# Patient Record
Sex: MALE | Race: BLACK OR AFRICAN AMERICAN | NOT HISPANIC OR LATINO | Employment: OTHER | URBAN - METROPOLITAN AREA
[De-identification: names, ages, dates, MRNs, and addresses within clinical notes are randomized per-mention and may not be internally consistent; named-entity substitution may affect disease eponyms.]

---

## 2023-11-10 ENCOUNTER — APPOINTMENT (OUTPATIENT)
Dept: RADIOLOGY | Facility: HOSPITAL | Age: 63
End: 2023-11-10
Payer: MEDICAID

## 2023-11-10 ENCOUNTER — HOSPITAL ENCOUNTER (EMERGENCY)
Facility: HOSPITAL | Age: 63
Discharge: HOME | End: 2023-11-11
Attending: EMERGENCY MEDICINE
Payer: MEDICAID

## 2023-11-10 DIAGNOSIS — E86.0 DEHYDRATION: Primary | ICD-10-CM

## 2023-11-10 DIAGNOSIS — M79.18 BUTTOCK PAIN: ICD-10-CM

## 2023-11-10 LAB
ALBUMIN SERPL BCP-MCNC: 4.3 G/DL (ref 3.4–5)
ALP SERPL-CCNC: 113 U/L (ref 33–136)
ALT SERPL W P-5'-P-CCNC: 7 U/L (ref 10–52)
ANION GAP SERPL CALC-SCNC: 18 MMOL/L (ref 10–20)
AST SERPL W P-5'-P-CCNC: 22 U/L (ref 9–39)
BASOPHILS # BLD AUTO: 0.03 X10*3/UL (ref 0–0.1)
BASOPHILS NFR BLD AUTO: 0.5 %
BILIRUB SERPL-MCNC: 0.3 MG/DL (ref 0–1.2)
BUN SERPL-MCNC: 15 MG/DL (ref 6–23)
CALCIUM SERPL-MCNC: 10.3 MG/DL (ref 8.6–10.6)
CHLORIDE SERPL-SCNC: 102 MMOL/L (ref 98–107)
CO2 SERPL-SCNC: 22 MMOL/L (ref 21–32)
CREAT SERPL-MCNC: 0.71 MG/DL (ref 0.5–1.3)
EOSINOPHIL # BLD AUTO: 0.03 X10*3/UL (ref 0–0.7)
EOSINOPHIL NFR BLD AUTO: 0.5 %
ERYTHROCYTE [DISTWIDTH] IN BLOOD BY AUTOMATED COUNT: 14.6 % (ref 11.5–14.5)
GFR SERPL CREATININE-BSD FRML MDRD: >90 ML/MIN/1.73M*2
GLUCOSE SERPL-MCNC: 94 MG/DL (ref 74–99)
HCT VFR BLD AUTO: 42.7 % (ref 41–52)
HGB BLD-MCNC: 13.7 G/DL (ref 13.5–17.5)
IMM GRANULOCYTES # BLD AUTO: 0.01 X10*3/UL (ref 0–0.7)
IMM GRANULOCYTES NFR BLD AUTO: 0.2 % (ref 0–0.9)
LYMPHOCYTES # BLD AUTO: 1.29 X10*3/UL (ref 1.2–4.8)
LYMPHOCYTES NFR BLD AUTO: 23.6 %
MCH RBC QN AUTO: 25.1 PG (ref 26–34)
MCHC RBC AUTO-ENTMCNC: 32.1 G/DL (ref 32–36)
MCV RBC AUTO: 78 FL (ref 80–100)
MONOCYTES # BLD AUTO: 0.41 X10*3/UL (ref 0.1–1)
MONOCYTES NFR BLD AUTO: 7.5 %
NEUTROPHILS # BLD AUTO: 3.7 X10*3/UL (ref 1.2–7.7)
NEUTROPHILS NFR BLD AUTO: 67.7 %
NRBC BLD-RTO: 0 /100 WBCS (ref 0–0)
PLATELET # BLD AUTO: 245 X10*3/UL (ref 150–450)
POTASSIUM SERPL-SCNC: 4.5 MMOL/L (ref 3.5–5.3)
PROT SERPL-MCNC: 7.6 G/DL (ref 6.4–8.2)
RBC # BLD AUTO: 5.46 X10*6/UL (ref 4.5–5.9)
SODIUM SERPL-SCNC: 137 MMOL/L (ref 136–145)
WBC # BLD AUTO: 5.5 X10*3/UL (ref 4.4–11.3)

## 2023-11-10 PROCEDURE — 36415 COLL VENOUS BLD VENIPUNCTURE: CPT | Performed by: EMERGENCY MEDICINE

## 2023-11-10 PROCEDURE — 99285 EMERGENCY DEPT VISIT HI MDM: CPT | Mod: 25 | Performed by: EMERGENCY MEDICINE

## 2023-11-10 PROCEDURE — 93005 ELECTROCARDIOGRAM TRACING: CPT

## 2023-11-10 PROCEDURE — 85025 COMPLETE CBC W/AUTO DIFF WBC: CPT | Performed by: EMERGENCY MEDICINE

## 2023-11-10 PROCEDURE — 96360 HYDRATION IV INFUSION INIT: CPT | Mod: 59

## 2023-11-10 PROCEDURE — 2500000004 HC RX 250 GENERAL PHARMACY W/ HCPCS (ALT 636 FOR OP/ED): Mod: SE | Performed by: EMERGENCY MEDICINE

## 2023-11-10 PROCEDURE — 84295 ASSAY OF SERUM SODIUM: CPT | Performed by: EMERGENCY MEDICINE

## 2023-11-10 PROCEDURE — 99285 EMERGENCY DEPT VISIT HI MDM: CPT | Performed by: EMERGENCY MEDICINE

## 2023-11-10 PROCEDURE — 74177 CT ABD & PELVIS W/CONTRAST: CPT | Mod: FR

## 2023-11-10 PROCEDURE — 74177 CT ABD & PELVIS W/CONTRAST: CPT | Mod: FOREIGN READ | Performed by: RADIOLOGY

## 2023-11-10 PROCEDURE — 96361 HYDRATE IV INFUSION ADD-ON: CPT

## 2023-11-10 PROCEDURE — 2550000001 HC RX 255 CONTRASTS: Mod: SE | Performed by: EMERGENCY MEDICINE

## 2023-11-10 RX ADMIN — IOHEXOL 100 ML: 350 INJECTION, SOLUTION INTRAVENOUS at 23:44

## 2023-11-10 RX ADMIN — SODIUM CHLORIDE, SODIUM LACTATE, POTASSIUM CHLORIDE, AND CALCIUM CHLORIDE 1000 ML: 600; 310; 30; 20 INJECTION, SOLUTION INTRAVENOUS at 22:31

## 2023-11-10 ASSESSMENT — LIFESTYLE VARIABLES
HAVE YOU EVER FELT YOU SHOULD CUT DOWN ON YOUR DRINKING: NO
REASON UNABLE TO ASSESS: NO
HAVE PEOPLE ANNOYED YOU BY CRITICIZING YOUR DRINKING: NO
EVER FELT BAD OR GUILTY ABOUT YOUR DRINKING: NO
EVER HAD A DRINK FIRST THING IN THE MORNING TO STEADY YOUR NERVES TO GET RID OF A HANGOVER: NO

## 2023-11-10 ASSESSMENT — COLUMBIA-SUICIDE SEVERITY RATING SCALE - C-SSRS
2. HAVE YOU ACTUALLY HAD ANY THOUGHTS OF KILLING YOURSELF?: NO
1. IN THE PAST MONTH, HAVE YOU WISHED YOU WERE DEAD OR WISHED YOU COULD GO TO SLEEP AND NOT WAKE UP?: NO
6. HAVE YOU EVER DONE ANYTHING, STARTED TO DO ANYTHING, OR PREPARED TO DO ANYTHING TO END YOUR LIFE?: NO

## 2023-11-11 VITALS
TEMPERATURE: 99.1 F | WEIGHT: 150 LBS | HEIGHT: 67 IN | OXYGEN SATURATION: 98 % | HEART RATE: 54 BPM | DIASTOLIC BLOOD PRESSURE: 42 MMHG | BODY MASS INDEX: 23.54 KG/M2 | RESPIRATION RATE: 16 BRPM | SYSTOLIC BLOOD PRESSURE: 98 MMHG

## 2023-11-11 LAB
APPEARANCE UR: CLEAR
BILIRUB UR STRIP.AUTO-MCNC: NEGATIVE MG/DL
COLOR UR: ABNORMAL
GLUCOSE UR STRIP.AUTO-MCNC: NEGATIVE MG/DL
HOLD SPECIMEN: NORMAL
KETONES UR STRIP.AUTO-MCNC: NEGATIVE MG/DL
LEUKOCYTE ESTERASE UR QL STRIP.AUTO: NEGATIVE
NITRITE UR QL STRIP.AUTO: NEGATIVE
PH UR STRIP.AUTO: 5 [PH]
PROT UR STRIP.AUTO-MCNC: NEGATIVE MG/DL
RBC # UR STRIP.AUTO: NEGATIVE /UL
SP GR UR STRIP.AUTO: 1.04
UROBILINOGEN UR STRIP.AUTO-MCNC: <2 MG/DL

## 2023-11-11 PROCEDURE — 2500000004 HC RX 250 GENERAL PHARMACY W/ HCPCS (ALT 636 FOR OP/ED): Mod: SE | Performed by: EMERGENCY MEDICINE

## 2023-11-11 PROCEDURE — 81003 URINALYSIS AUTO W/O SCOPE: CPT | Performed by: EMERGENCY MEDICINE

## 2023-11-11 RX ADMIN — SODIUM CHLORIDE, POTASSIUM CHLORIDE, SODIUM LACTATE AND CALCIUM CHLORIDE 1000 ML: 600; 310; 30; 20 INJECTION, SOLUTION INTRAVENOUS at 01:32

## 2023-11-11 NOTE — ED PROVIDER NOTES
HPI   Chief Complaint   Patient presents with    Generalized Body Aches     Patient c/o chronic butt pain. Pt is bilateral amputee who sts he spends extended time in wheelchair. Pt sts this is a chronic problem and has been seen for this before. Pt presents in no acute distress and has no other complaints at this time.        Patient is a 63-year-old male with past medical history diabetes and peripheral artery disease status post bilateral leg amputation who presents to the ED with right sided buttock pain.   Patient is wheelchair-bound and states that he has intermittently gotten these pains for the past several years but recent episode started yesterday.  Patient denies any other symptoms. No abdominal pain, nausea, vomiting, diarrhea, constipation, or urinary complaints.  No blood in stool or black/tarry stools.  No recent fever, chills, chest pain, shortness of breath.      No trauma, falls, or injuries. No headache, dizziness, vision changes, neck pain, back pain, numbness, or tingling. No sick contacts or recent travels.                     No data recorded                Patient History   No past medical history on file.  Past Surgical History:   Procedure Laterality Date    CT ABDOMEN PELVIS ANGIOGRAM W AND/OR WO IV CONTRAST  12/16/2022    CT ABDOMEN PELVIS ANGIOGRAM W AND/OR WO IV CONTRAST 12/16/2022 [facility]    CT ABDOMEN PELVIS ANGIOGRAM W AND/OR WO IV CONTRAST  8/18/2023    CT ABDOMEN PELVIS ANGIOGRAM W AND/OR WO IV CONTRAST [place]18/2023 [facility] [place]    [place] ABDOMEN PELVIS ANGIOGRAM W AND/OR WO IV CONTRAST  9/3/2023    CT ABDOMEN PELVIS ANGIOGRAM W AND/OR WO IV CONTRAST 9/3/2023 [facility] [place]     No family history on file.  Social History     Tobacco Use    Smoking status: Not on file    Smokeless tobacco: Not on file   Substance Use Topics    Alcohol use: Not on file    Drug use: Not on file       Physical Exam   ED Triage Vitals [11/10/23 2143]   Temp Heart Rate Resp BP   37.3 °C (99.1 °F) 72 18 (!) 90/49       SpO2 Temp Source Heart Rate Source Patient Position   95 % Tympanic Monitor Sitting      BP Location FiO2 (%)     -- --       Physical Exam  Constitutional:       General: He is not in acute distress.  HENT:      Head: Normocephalic and atraumatic.      Mouth/Throat:      Mouth: Mucous membranes are dry.   Cardiovascular:      Rate and Rhythm: Normal rate and regular rhythm.      Heart sounds: Normal heart sounds. No murmur heard.  Pulmonary:      Effort: Pulmonary effort is normal. No respiratory distress.      Breath sounds: Normal breath sounds.   Abdominal:      General: There is no distension.      Palpations: Abdomen is soft.      Comments: Right buttock/perianal tenderness to palpation.  No overlying skin changes.   Musculoskeletal:      Comments:  Bilateral lower extremity amputation.   Skin:     General: Skin is warm and dry.      Findings: No rash.   Neurological:      Mental Status: He is alert and oriented to person, place, and time.         ED Course & MDM        Medical Decision Making  63-year-old male with past medical history diabetes and peripheral artery disease status post bilateral leg amputation who presents to the ED with right sided buttock pain.   Patient initially hypotensive at 90/49.   Patient has right buttock/perianal tenderness to palpation, exam otherwise unremarkable.   No overlying skin changes.  Have low concern for pressure ulcer, necrotizing infection, or underlying osteomyelitis.   Labs, UA, and CT abdomen pelvis with contrast ordered to evaluate for abdominal pathology.  Patient declined rectal examination.  IV fluids ordered for hypotension.   Patient stable and signed out to evening team with labs and CT pending.  Please see their note for further updates.        Procedure  Procedures     Kalyn Ling  11/10/23 6666       Nancy Castillo MD  11/11/23 3340

## 2023-11-11 NOTE — PROGRESS NOTES
Pt received in sign out from previous shift. Pt was pending CT abd/pelvis w/ contrast, the results of which were not indicative of any clear acute process causing his current symptoms. His lab work and UA were likewise WNL. His blood pressures were soft in the ED and he was given a liter bolus of IVF. His blood pressures have been consistently trending upwards. There is no obvious source for infection, his UA was grossly normal, his CT did not indicate any clear new pathology, and his initial low blood pressure resolved over his time in the ED. We did discuss the importance of adequate fluid intake and hydration, as his improvement in blood pressure after IVF could indicate dehydration. I am most suspicious that his rectal pain and right buttock pain can be largely attributed to his chronic wheelchair use. Discussed all results with pt, and advised he follow up with his PCP as needed. All questions answered, pt agrees with and understand plan of care.       IMPRESSION:  No definite acute intra-abdominal pathology identified.  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  This is unchanged compared to the previous study.

## 2023-11-12 ENCOUNTER — CLINICAL SUPPORT (OUTPATIENT)
Dept: EMERGENCY MEDICINE | Facility: HOSPITAL | Age: 63
End: 2023-11-12
Payer: MEDICAID

## 2023-11-12 LAB
ATRIAL RATE: 70 BPM
P AXIS: 77 DEGREES
P OFFSET: 175 MS
P ONSET: 132 MS
PR INTERVAL: 186 MS
Q ONSET: 225 MS
QRS COUNT: 12 BEATS
QRS DURATION: 78 MS
QT INTERVAL: 388 MS
QTC CALCULATION(BAZETT): 419 MS
QTC FREDERICIA: 408 MS
R AXIS: 62 DEGREES
T AXIS: 62 DEGREES
T OFFSET: 419 MS
VENTRICULAR RATE: 70 BPM

## 2023-12-31 ENCOUNTER — HOSPITAL ENCOUNTER (EMERGENCY)
Facility: HOSPITAL | Age: 63
Discharge: HOME | End: 2024-01-01
Attending: EMERGENCY MEDICINE
Payer: MEDICAID

## 2023-12-31 DIAGNOSIS — B34.9 VIRAL ILLNESS: Primary | ICD-10-CM

## 2023-12-31 LAB
ANION GAP SERPL CALC-SCNC: 16 MMOL/L (ref 10–20)
BASOPHILS # BLD AUTO: 0.02 X10*3/UL (ref 0–0.1)
BASOPHILS NFR BLD AUTO: 0.5 %
BUN SERPL-MCNC: 25 MG/DL (ref 6–23)
CALCIUM SERPL-MCNC: 9.8 MG/DL (ref 8.6–10.6)
CHLORIDE SERPL-SCNC: 104 MMOL/L (ref 98–107)
CK SERPL-CCNC: 128 U/L (ref 0–325)
CO2 SERPL-SCNC: 25 MMOL/L (ref 21–32)
CREAT SERPL-MCNC: 0.93 MG/DL (ref 0.5–1.3)
EOSINOPHIL # BLD AUTO: 0.03 X10*3/UL (ref 0–0.7)
EOSINOPHIL NFR BLD AUTO: 0.8 %
ERYTHROCYTE [DISTWIDTH] IN BLOOD BY AUTOMATED COUNT: 17 % (ref 11.5–14.5)
FLUAV RNA RESP QL NAA+PROBE: NOT DETECTED
FLUBV RNA RESP QL NAA+PROBE: NOT DETECTED
GFR SERPL CREATININE-BSD FRML MDRD: >90 ML/MIN/1.73M*2
GLUCOSE SERPL-MCNC: 99 MG/DL (ref 74–99)
HCT VFR BLD AUTO: 45 % (ref 41–52)
HGB BLD-MCNC: 14.4 G/DL (ref 13.5–17.5)
HOLD SPECIMEN: NORMAL
IMM GRANULOCYTES # BLD AUTO: 0 X10*3/UL (ref 0–0.7)
IMM GRANULOCYTES NFR BLD AUTO: 0 % (ref 0–0.9)
LYMPHOCYTES # BLD AUTO: 1.33 X10*3/UL (ref 1.2–4.8)
LYMPHOCYTES NFR BLD AUTO: 35.9 %
MCH RBC QN AUTO: 23.9 PG (ref 26–34)
MCHC RBC AUTO-ENTMCNC: 32 G/DL (ref 32–36)
MCV RBC AUTO: 75 FL (ref 80–100)
MONOCYTES # BLD AUTO: 0.45 X10*3/UL (ref 0.1–1)
MONOCYTES NFR BLD AUTO: 12.2 %
NEUTROPHILS # BLD AUTO: 1.87 X10*3/UL (ref 1.2–7.7)
NEUTROPHILS NFR BLD AUTO: 50.6 %
NRBC BLD-RTO: 0 /100 WBCS (ref 0–0)
PLATELET # BLD AUTO: 240 X10*3/UL (ref 150–450)
POTASSIUM SERPL-SCNC: 4.7 MMOL/L (ref 3.5–5.3)
RBC # BLD AUTO: 6.03 X10*6/UL (ref 4.5–5.9)
RSV RNA RESP QL NAA+PROBE: NOT DETECTED
SARS-COV-2 RNA RESP QL NAA+PROBE: NOT DETECTED
SODIUM SERPL-SCNC: 140 MMOL/L (ref 136–145)
WBC # BLD AUTO: 3.7 X10*3/UL (ref 4.4–11.3)

## 2023-12-31 PROCEDURE — 80048 BASIC METABOLIC PNL TOTAL CA: CPT

## 2023-12-31 PROCEDURE — 96361 HYDRATE IV INFUSION ADD-ON: CPT

## 2023-12-31 PROCEDURE — 99284 EMERGENCY DEPT VISIT MOD MDM: CPT | Performed by: EMERGENCY MEDICINE

## 2023-12-31 PROCEDURE — 2500000004 HC RX 250 GENERAL PHARMACY W/ HCPCS (ALT 636 FOR OP/ED): Mod: SE

## 2023-12-31 PROCEDURE — 82550 ASSAY OF CK (CPK): CPT

## 2023-12-31 PROCEDURE — 36415 COLL VENOUS BLD VENIPUNCTURE: CPT

## 2023-12-31 PROCEDURE — 85025 COMPLETE CBC W/AUTO DIFF WBC: CPT

## 2023-12-31 PROCEDURE — 96374 THER/PROPH/DIAG INJ IV PUSH: CPT

## 2023-12-31 PROCEDURE — 87637 SARSCOV2&INF A&B&RSV AMP PRB: CPT

## 2023-12-31 RX ORDER — KETOROLAC TROMETHAMINE 15 MG/ML
15 INJECTION, SOLUTION INTRAMUSCULAR; INTRAVENOUS ONCE
Status: COMPLETED | OUTPATIENT
Start: 2023-12-31 | End: 2023-12-31

## 2023-12-31 RX ORDER — ACETAMINOPHEN 325 MG/1
650 TABLET ORAL ONCE
Status: COMPLETED | OUTPATIENT
Start: 2023-12-31 | End: 2023-12-31

## 2023-12-31 RX ADMIN — ACETAMINOPHEN 650 MG: 325 TABLET ORAL at 21:30

## 2023-12-31 RX ADMIN — KETOROLAC TROMETHAMINE 15 MG: 15 INJECTION INTRAMUSCULAR; INTRAVENOUS at 20:13

## 2023-12-31 RX ADMIN — SODIUM CHLORIDE, POTASSIUM CHLORIDE, SODIUM LACTATE AND CALCIUM CHLORIDE 1000 ML: 600; 310; 30; 20 INJECTION, SOLUTION INTRAVENOUS at 20:12

## 2023-12-31 ASSESSMENT — COLUMBIA-SUICIDE SEVERITY RATING SCALE - C-SSRS
1. IN THE PAST MONTH, HAVE YOU WISHED YOU WERE DEAD OR WISHED YOU COULD GO TO SLEEP AND NOT WAKE UP?: NO
6. HAVE YOU EVER DONE ANYTHING, STARTED TO DO ANYTHING, OR PREPARED TO DO ANYTHING TO END YOUR LIFE?: NO
2. HAVE YOU ACTUALLY HAD ANY THOUGHTS OF KILLING YOURSELF?: NO

## 2023-12-31 ASSESSMENT — LIFESTYLE VARIABLES
HAVE PEOPLE ANNOYED YOU BY CRITICIZING YOUR DRINKING: NO
REASON UNABLE TO ASSESS: NO
EVER FELT BAD OR GUILTY ABOUT YOUR DRINKING: NO
EVER HAD A DRINK FIRST THING IN THE MORNING TO STEADY YOUR NERVES TO GET RID OF A HANGOVER: NO
HAVE YOU EVER FELT YOU SHOULD CUT DOWN ON YOUR DRINKING: NO

## 2023-12-31 ASSESSMENT — PAIN SCALES - GENERAL
PAINLEVEL_OUTOF10: 9
PAINLEVEL_OUTOF10: 7

## 2023-12-31 ASSESSMENT — PAIN DESCRIPTION - DESCRIPTORS: DESCRIPTORS: SHARP

## 2023-12-31 ASSESSMENT — PAIN DESCRIPTION - PAIN TYPE: TYPE: ACUTE PAIN

## 2023-12-31 ASSESSMENT — PAIN - FUNCTIONAL ASSESSMENT: PAIN_FUNCTIONAL_ASSESSMENT: 0-10

## 2024-01-01 VITALS
WEIGHT: 150 LBS | OXYGEN SATURATION: 100 % | TEMPERATURE: 98.2 F | SYSTOLIC BLOOD PRESSURE: 143 MMHG | HEIGHT: 67 IN | RESPIRATION RATE: 16 BRPM | HEART RATE: 66 BPM | BODY MASS INDEX: 23.54 KG/M2 | DIASTOLIC BLOOD PRESSURE: 78 MMHG

## 2024-01-01 RX ORDER — ONDANSETRON 4 MG/1
TABLET, ORALLY DISINTEGRATING ORAL
Status: DISCONTINUED
Start: 2024-01-01 | End: 2024-01-01 | Stop reason: HOSPADM

## 2024-01-01 ASSESSMENT — PAIN SCALES - GENERAL: PAINLEVEL_OUTOF10: 0 - NO PAIN

## 2024-01-01 NOTE — ED PROVIDER NOTES
"HPI   Chief Complaint   Patient presents with    Generalized Body Aches       HPI     Patient is a 63-year-old male with past medical history significant for diabetes, PAD, and bilateral above-the-knee amputations presented to the emergency room for generalized body aches. Patient states that this morning when he woke up he felt \"sore all over\". He states that his arms chest and abdomen are all sore bilaterally. He also endorses a nonproductive cough. Patient denies fevers, chills, nausea, vomiting, diarrhea, urinary symptoms, rash, shortness of breath. States that he has not drink alcohol or taking any drugs. No known sick contacts. States that he is just been eating and drinking normally. No history of trauma or new activities that could explain soreness.               Chitra Coma Scale Score: 15                  Patient History   No past medical history on file.  Past Surgical History:   Procedure Laterality Date    CT ABDOMEN PELVIS ANGIOGRAM W AND/OR WO IV CONTRAST  12/16/2022    CT ABDOMEN PELVIS ANGIOGRAM W AND/OR WO IV CONTRAST 12/16/2022 [facility]    CT ABDOMEN PELVIS ANGIOGRAM W AND/OR WO IV CONTRAST  8/18/2023    CT ABDOMEN PELVIS ANGIOGRAM W AND/OR WO IV CONTRAST 8/18/2023 [place] [facility]    CT ABDOMEN PELVIS ANGIOGRAM W AND/OR WO IV CONTRAST  9/3/2023    CT ABDOMEN PELVIS ANGIOGRAM W AND/OR WO IV CONTRAST 9/3/2023 [place] [facility]     No family history on file.  Social History     Tobacco Use    Smoking status: Not on file    Smokeless tobacco: Not on file   Substance Use Topics    Alcohol use: Not on file    Drug use: Not on file       Physical Exam   ED Triage Vitals [12/31/23 1938]   Temp Heart Rate Resp BP   36.4 °C (97.6 °F) 63 20 107/62      SpO2 Temp Source Heart Rate Source Patient Position   97 % Oral Monitor Lying      BP Location FiO2 (%)     Left arm ,       Physical Exam  Vitals and nursing note reviewed.   HENT:      Head: Normocephalic and atraumatic.      Nose: No rhinorrhea.      Mouth/Throat: "      Mouth: Mucous membranes are moist.      Pharynx: Oropharynx is clear. No oropharyngeal exudate or posterior oropharyngeal erythema.   Eyes:      General: No scleral icterus.     Extraocular Movements: Extraocular movements intact.      Conjunctiva/sclera: Conjunctivae normal.      Pupils: Pupils are equal, round, and reactive to light.   Cardiovascular:      Rate and Rhythm: Normal rate and regular rhythm.      Pulses: Normal pulses.      Heart sounds: Normal heart sounds. No murmur heard.  Pulmonary:      Effort: Pulmonary effort is normal.      Breath sounds: Normal breath sounds. No wheezing, rhonchi or rales.   Abdominal:      Palpations: Abdomen is soft. There is no mass.      Tenderness: There is no abdominal tenderness. There is no guarding or rebound.   Musculoskeletal:         General: No swelling, tenderness, deformity or signs of injury. Normal range of motion.      Cervical back: Normal range of motion and neck supple.      Comments: Bilateral above-the-knee amputations. No edema or rash.  endorsed some pain over his right amputation stump which I examined which did not have signs of erythema, fluctuance or exquisite tenderness to palpation.   Skin:     General: Skin is warm and dry.      Capillary Refill: Capillary refill takes less than 2 seconds.      Coloration: Skin is not jaundiced.      Findings: No rash.   Neurological:      General: No focal deficit present.      Mental Status: He is alert and oriented to person, place, and time.      Cranial Nerves: No cranial nerve deficit.      Sensory: No sensory deficit.      Motor: No weakness.   Psychiatric:         Mood and Affect: Mood normal.         Behavior: Behavior normal.         Thought Content: Thought content normal.         ED Course & MDM   Diagnoses as of 12/31/23 2201   Viral illness   Patient is a 63-year-old male with past medical history significant for diabetes, PAD, and bilateral above-the-knee amputations presenting to the  "emergency department for generalized malaise. Patient is overall well-appearing on exam, breathing comfortably, and states that he cannot localize \"soreness\" to any 1 particular part of his body. Basic labs including BMP and CBC were acquired which were largely reassuring. No leukocytosis anemia or electrolyte derangements. COVID influenza and RSV test negative. CK within normal limits making myositis unlikely. Patient was given 50 mg IV ketorolac and 650 mg acetaminophen with 1 L bolus LR which moderately improved symptoms. Patient demonstrated good p.o. intake during treatment and is feeling symptomatically better at this time. Suspect viral illness as etiology for patient's general malaise. Patient was notified of negative workup and instructed to follow-up with primary care provider.     Medical Decision Making      Procedure  Procedures     Steven Childers MD  Resident  12/31/23 4770    "

## 2024-01-21 ENCOUNTER — APPOINTMENT (OUTPATIENT)
Dept: RADIOLOGY | Facility: HOSPITAL | Age: 64
End: 2024-01-21
Payer: MEDICAID

## 2024-01-21 ENCOUNTER — HOSPITAL ENCOUNTER (EMERGENCY)
Facility: HOSPITAL | Age: 64
Discharge: HOME | End: 2024-01-21
Attending: EMERGENCY MEDICINE
Payer: MEDICAID

## 2024-01-21 VITALS
RESPIRATION RATE: 16 BRPM | TEMPERATURE: 98.1 F | SYSTOLIC BLOOD PRESSURE: 143 MMHG | HEIGHT: 66 IN | BODY MASS INDEX: 25.71 KG/M2 | HEART RATE: 57 BPM | WEIGHT: 160 LBS | DIASTOLIC BLOOD PRESSURE: 55 MMHG | OXYGEN SATURATION: 98 %

## 2024-01-21 DIAGNOSIS — K59.00 CONSTIPATION, UNSPECIFIED CONSTIPATION TYPE: Primary | ICD-10-CM

## 2024-01-21 LAB
ALBUMIN SERPL BCP-MCNC: 4.6 G/DL (ref 3.4–5)
ALP SERPL-CCNC: 87 U/L (ref 33–136)
ALT SERPL W P-5'-P-CCNC: 10 U/L (ref 10–52)
ANION GAP SERPL CALC-SCNC: 15 MMOL/L (ref 10–20)
AST SERPL W P-5'-P-CCNC: 17 U/L (ref 9–39)
BASOPHILS # BLD AUTO: 0.03 X10*3/UL (ref 0–0.1)
BASOPHILS NFR BLD AUTO: 0.8 %
BILIRUB SERPL-MCNC: 0.3 MG/DL (ref 0–1.2)
BUN SERPL-MCNC: 20 MG/DL (ref 6–23)
CALCIUM SERPL-MCNC: 9.7 MG/DL (ref 8.6–10.6)
CHLORIDE SERPL-SCNC: 103 MMOL/L (ref 98–107)
CO2 SERPL-SCNC: 26 MMOL/L (ref 21–32)
CREAT SERPL-MCNC: 0.78 MG/DL (ref 0.5–1.3)
EGFRCR SERPLBLD CKD-EPI 2021: >90 ML/MIN/1.73M*2
EOSINOPHIL # BLD AUTO: 0.06 X10*3/UL (ref 0–0.7)
EOSINOPHIL NFR BLD AUTO: 1.7 %
ERYTHROCYTE [DISTWIDTH] IN BLOOD BY AUTOMATED COUNT: 18.4 % (ref 11.5–14.5)
GLUCOSE SERPL-MCNC: 86 MG/DL (ref 74–99)
HCT VFR BLD AUTO: 46.5 % (ref 41–52)
HGB BLD-MCNC: 14.8 G/DL (ref 13.5–17.5)
IMM GRANULOCYTES # BLD AUTO: 0.01 X10*3/UL (ref 0–0.7)
IMM GRANULOCYTES NFR BLD AUTO: 0.3 % (ref 0–0.9)
LIPASE SERPL-CCNC: 41 U/L (ref 9–82)
LYMPHOCYTES # BLD AUTO: 1.41 X10*3/UL (ref 1.2–4.8)
LYMPHOCYTES NFR BLD AUTO: 39.7 %
MCH RBC QN AUTO: 24.1 PG (ref 26–34)
MCHC RBC AUTO-ENTMCNC: 31.8 G/DL (ref 32–36)
MCV RBC AUTO: 76 FL (ref 80–100)
MONOCYTES # BLD AUTO: 0.5 X10*3/UL (ref 0.1–1)
MONOCYTES NFR BLD AUTO: 14.1 %
NEUTROPHILS # BLD AUTO: 1.54 X10*3/UL (ref 1.2–7.7)
NEUTROPHILS NFR BLD AUTO: 43.4 %
NRBC BLD-RTO: 0 /100 WBCS (ref 0–0)
PLATELET # BLD AUTO: 243 X10*3/UL (ref 150–450)
POTASSIUM SERPL-SCNC: 5 MMOL/L (ref 3.5–5.3)
PROT SERPL-MCNC: 7.4 G/DL (ref 6.4–8.2)
RBC # BLD AUTO: 6.13 X10*6/UL (ref 4.5–5.9)
SODIUM SERPL-SCNC: 139 MMOL/L (ref 136–145)
WBC # BLD AUTO: 3.6 X10*3/UL (ref 4.4–11.3)

## 2024-01-21 PROCEDURE — 99284 EMERGENCY DEPT VISIT MOD MDM: CPT | Performed by: EMERGENCY MEDICINE

## 2024-01-21 PROCEDURE — 2550000001 HC RX 255 CONTRASTS: Mod: SE | Performed by: EMERGENCY MEDICINE

## 2024-01-21 PROCEDURE — 36415 COLL VENOUS BLD VENIPUNCTURE: CPT | Performed by: PHYSICIAN ASSISTANT

## 2024-01-21 PROCEDURE — 2500000004 HC RX 250 GENERAL PHARMACY W/ HCPCS (ALT 636 FOR OP/ED): Mod: SE

## 2024-01-21 PROCEDURE — 74177 CT ABD & PELVIS W/CONTRAST: CPT

## 2024-01-21 PROCEDURE — 80053 COMPREHEN METABOLIC PANEL: CPT | Performed by: PHYSICIAN ASSISTANT

## 2024-01-21 PROCEDURE — 74177 CT ABD & PELVIS W/CONTRAST: CPT | Performed by: STUDENT IN AN ORGANIZED HEALTH CARE EDUCATION/TRAINING PROGRAM

## 2024-01-21 PROCEDURE — 83690 ASSAY OF LIPASE: CPT | Performed by: PHYSICIAN ASSISTANT

## 2024-01-21 PROCEDURE — 99285 EMERGENCY DEPT VISIT HI MDM: CPT | Performed by: PHYSICIAN ASSISTANT

## 2024-01-21 PROCEDURE — 85025 COMPLETE CBC W/AUTO DIFF WBC: CPT | Performed by: PHYSICIAN ASSISTANT

## 2024-01-21 PROCEDURE — 96374 THER/PROPH/DIAG INJ IV PUSH: CPT

## 2024-01-21 RX ORDER — KETOROLAC TROMETHAMINE 15 MG/ML
15 INJECTION, SOLUTION INTRAMUSCULAR; INTRAVENOUS ONCE
Status: COMPLETED | OUTPATIENT
Start: 2024-01-21 | End: 2024-01-21

## 2024-01-21 RX ORDER — DOCUSATE SODIUM 250 MG
250 CAPSULE ORAL DAILY
Qty: 10 CAPSULE | Refills: 0 | Status: SHIPPED | OUTPATIENT
Start: 2024-01-21 | End: 2024-01-31

## 2024-01-21 RX ADMIN — KETOROLAC TROMETHAMINE 15 MG: 15 INJECTION, SOLUTION INTRAMUSCULAR; INTRAVENOUS at 19:28

## 2024-01-21 RX ADMIN — IOHEXOL 80 ML: 350 INJECTION, SOLUTION INTRAVENOUS at 19:13

## 2024-01-21 ASSESSMENT — LIFESTYLE VARIABLES
REASON UNABLE TO ASSESS: YES
EVER HAD A DRINK FIRST THING IN THE MORNING TO STEADY YOUR NERVES TO GET RID OF A HANGOVER: NO
EVER FELT BAD OR GUILTY ABOUT YOUR DRINKING: NO
HAVE YOU EVER FELT YOU SHOULD CUT DOWN ON YOUR DRINKING: NO
HAVE PEOPLE ANNOYED YOU BY CRITICIZING YOUR DRINKING: NO

## 2024-01-21 ASSESSMENT — PAIN SCALES - GENERAL: PAINLEVEL_OUTOF10: 10 - WORST POSSIBLE PAIN

## 2024-01-21 ASSESSMENT — COLUMBIA-SUICIDE SEVERITY RATING SCALE - C-SSRS
6. HAVE YOU EVER DONE ANYTHING, STARTED TO DO ANYTHING, OR PREPARED TO DO ANYTHING TO END YOUR LIFE?: NO
2. HAVE YOU ACTUALLY HAD ANY THOUGHTS OF KILLING YOURSELF?: NO
1. IN THE PAST MONTH, HAVE YOU WISHED YOU WERE DEAD OR WISHED YOU COULD GO TO SLEEP AND NOT WAKE UP?: NO

## 2024-01-21 NOTE — ED PROVIDER NOTES
HPI:  63-year-old male with history of DM, PAD SP BL AKA, history of recent ischemic colitis presenting for concerns of constipation.  States he has not had a bowel movement in almost a week.  Has not tried any over-the-counter laxatives or stool softeners as he has no access to over-the-counter pharmaceuticals.  He denies any fevers, chills, night sweats or rigors, nausea, vomiting.  Denies any abdominal pain.    Physical Exam:   GEN: Vitals noted. NAD  EYES:  EOMs grossly intact, anicteric sclera  HARI: Mucosa moist.  NECK: Supple.  CARD: RRR  PULMONARY: Moving air well. Clear all lung fields.  ABDOMEN: Soft, no guarding, no rigidity. Nontender. NABS  EXTREMITIES: BL AKA  SKIN: Intact, warm and dry  NEURO: Alert and oriented x 3, speech is clear, no obvious deficits noted.       ----------------------------------------------------------------------------------------------------------------------------    MDM:  63-year-old male with history as above presenting for constipation.  On exam he is well-appearing, sitting up comfortably.  Vital signs stable.  ABD soft NTTP with NABS.  Given his history of ischemic colitis with his constipation like to rule out acute intra-abdominal pathology causing his decreased bowel activity therefore will check laboratory work and CT.  Patient discussed and seen with ED attending.    ED Course as of 01/22/24 1010   Sun Jan 21, 2024   1649 Laboratory work without leukocytosis or anemia, normal electrolytes, negative lipase. [JEANNE]   1845 Tolerating PO, toradol given [SA]   1942 IMPRESSION:  1.  No acute abdominopelvic finding. No evidence of abnormal bowel  dilatation or inflammatory bowel wall thickening is present.  2. Re-demonstration of thrombosis of the infrarenal abdominal aorta  with postsurgical changes from thrombosed aortobifemoral and left  axillary femoral bypass grafts.  3. Additional findings as described above.   [SA]      ED Course User Index  [JEANNE] Randell Bray,  ROYA  [SA] Dana Reddy DO         Diagnoses as of 01/22/24 1010   Constipation, unspecified constipation type       At the end of shift, CT pending, signed out to oncoming resident    Patient verbalized understanding and agreeing with discharge plan.  All questions were answered.  Return precautions reviewed.    CT abdomen pelvis w IV contrast    (Results Pending)     Labs Reviewed   CBC WITH AUTO DIFFERENTIAL   COMPREHENSIVE METABOLIC PANEL   LIPASE       ----------------------------------------------------------------------------------------------------------------------------    This note was dictated using a speech recognition program.  While an attempt was made at proof reading to minimize errors, minor errors in transcription may be present call for questions.     Randell Bray PA-C  01/22/24 1011

## 2024-01-21 NOTE — PROGRESS NOTES
I received this patient during signout.  Please see previous provider's note for detailed H&P, labs and imaging.    Briefly, this is a 63-year-old male with history of diabetes, PAD status post bilateral AKA, recent ischemic colitis who presents with constipation.  Patient did not try any over-the-counter laxatives or stool softeners.  He denies any abdominal pain, fevers, chills, nausea, vomiting.  Labs and CT ordered prior to shift.  Labs overall unremarkable, and no leukocytosis, hemoglobin stable, metabolic panel within normal limits.  Patient did have a normal bowel movement prior to my shift and notes improvement in symptoms.    Plan at the time of signout was await CT results and disposition patient.    Under my care, patient reassessed and remains clinically stable. CT overall unremarkable for acute changes as below.     @  ED Course as of 01/21/24 1942   Sun Jan 21, 2024 1649 Laboratory work without leukocytosis or anemia, normal electrolytes, negative lipase. [JEANNE]   1845 Tolerating PO, toradol given [SA]   1942 IMPRESSION:  1.  No acute abdominopelvic finding. No evidence of abnormal bowel  dilatation or inflammatory bowel wall thickening is present.  2. Re-demonstration of thrombosis of the infrarenal abdominal aorta  with postsurgical changes from thrombosed aortobifemoral and left  axillary femoral bypass grafts.  3. Additional findings as described above.   [SA]      ED Course User Index  [JEANNE] Randell Bray PA-C  [SA] Dana Reddy DO   @    Disposition:     Discharged in stable condition. Patient will follow-up with the primary physician in the next 2-3 days. Return if worse. They understand return precautions and discharge instructions. Patient and family/friend/caregiver are in agreement with this plan.     Patient seen and staffed with attending physician.     Dana Reddy DO   EM PGY2

## 2024-01-22 NOTE — DISCHARGE INSTRUCTIONS
Please increase fiber in diet, take stool softener as prescribed. Follow-up with your primary care doctor as needed.  If you do not have a primary care doctor you may call [phone] to make an appointment.

## 2024-02-08 ENCOUNTER — HOSPITAL ENCOUNTER (EMERGENCY)
Facility: HOSPITAL | Age: 64
Discharge: HOME | End: 2024-02-09
Payer: MEDICAID

## 2024-02-08 VITALS
RESPIRATION RATE: 18 BRPM | HEIGHT: 67 IN | HEART RATE: 70 BPM | OXYGEN SATURATION: 96 % | TEMPERATURE: 96.8 F | WEIGHT: 160 LBS | DIASTOLIC BLOOD PRESSURE: 72 MMHG | SYSTOLIC BLOOD PRESSURE: 141 MMHG | BODY MASS INDEX: 25.11 KG/M2

## 2024-02-08 DIAGNOSIS — M25.552 ARTHRALGIA OF LEFT SIDE OF PELVIS: Primary | ICD-10-CM

## 2024-02-08 PROCEDURE — 99283 EMERGENCY DEPT VISIT LOW MDM: CPT

## 2024-02-08 PROCEDURE — 99284 EMERGENCY DEPT VISIT MOD MDM: CPT

## 2024-02-08 RX ORDER — ACETAMINOPHEN 325 MG/1
650 TABLET ORAL ONCE
Status: DISCONTINUED | OUTPATIENT
Start: 2024-02-08 | End: 2024-02-09 | Stop reason: HOSPADM

## 2024-02-08 RX ORDER — CYCLOBENZAPRINE HCL 10 MG
10 TABLET ORAL ONCE
Status: DISCONTINUED | OUTPATIENT
Start: 2024-02-08 | End: 2024-02-09 | Stop reason: HOSPADM

## 2024-02-08 RX ORDER — IBUPROFEN 600 MG/1
600 TABLET ORAL EVERY 6 HOURS PRN
Qty: 16 TABLET | Refills: 0 | Status: SHIPPED | OUTPATIENT
Start: 2024-02-08 | End: 2024-02-12

## 2024-02-08 RX ORDER — IBUPROFEN 600 MG/1
600 TABLET ORAL ONCE
Status: DISCONTINUED | OUTPATIENT
Start: 2024-02-08 | End: 2024-02-09 | Stop reason: HOSPADM

## 2024-02-08 RX ORDER — CYCLOBENZAPRINE HCL 10 MG
10 TABLET ORAL 2 TIMES DAILY PRN
Qty: 6 TABLET | Refills: 0 | Status: SHIPPED | OUTPATIENT
Start: 2024-02-08 | End: 2024-03-05 | Stop reason: ALTCHOICE

## 2024-02-08 RX ORDER — ACETAMINOPHEN 325 MG/1
650 TABLET ORAL EVERY 6 HOURS PRN
Qty: 20 TABLET | Refills: 0 | Status: SHIPPED | OUTPATIENT
Start: 2024-02-08 | End: 2024-02-13

## 2024-02-08 ASSESSMENT — COLUMBIA-SUICIDE SEVERITY RATING SCALE - C-SSRS
6. HAVE YOU EVER DONE ANYTHING, STARTED TO DO ANYTHING, OR PREPARED TO DO ANYTHING TO END YOUR LIFE?: NO
1. IN THE PAST MONTH, HAVE YOU WISHED YOU WERE DEAD OR WISHED YOU COULD GO TO SLEEP AND NOT WAKE UP?: NO
2. HAVE YOU ACTUALLY HAD ANY THOUGHTS OF KILLING YOURSELF?: NO

## 2024-02-08 ASSESSMENT — PAIN - FUNCTIONAL ASSESSMENT: PAIN_FUNCTIONAL_ASSESSMENT: 0-10

## 2024-02-08 ASSESSMENT — PAIN SCALES - GENERAL: PAINLEVEL_OUTOF10: 10 - WORST POSSIBLE PAIN

## 2024-02-08 NOTE — ED PROVIDER NOTES
HPI   Chief Complaint   Patient presents with   • Hip Pain   • Wound Check       63-year-old male with history of DM2, HLD, HTN, bilateral AKA, and GERD presents for chief complaint of left pelvic pain.  Denies any falls or injuries recently.  States that since his surgery he is had some pain in this area.  Intermittently.  Currently 7/10.  Denies any wounds in this area.  Denies changes in urination or bowel movement.  Denies numbness or tingling.                          Chitra Coma Scale Score: 15                     Patient History   No past medical history on file.  Past Surgical History:   Procedure Laterality Date   • CT ABDOMEN PELVIS ANGIOGRAM W AND/OR WO IV CONTRAST  12/16/2022    CT ABDOMEN PELVIS ANGIOGRAM W AND/OR WO IV CONTRAST 12/16/2022 [facility]   • CT ABDOMEN PELVIS ANGIOGRAM W AND/OR WO IV CONTRAST  8/18/2023    CT ABDOMEN PELVIS ANGIOGRAM W AND/OR WO IV CONTRAST 8/18/2023 [facility]   • CT ABDOMEN PELVIS ANGIOGRAM W AND/OR WO IV CONTRAST  9/3/2023    CT ABDOMEN PELVIS ANGIOGRAM W AND/OR WO IV CONTRAST 9/3/2023 [facility] CT     No family history on file.  Social History     Tobacco Use   • Smoking status: Not on file   • Smokeless tobacco: Not on file   Substance Use Topics   • Alcohol use: Not on file   • Drug use: Not on file       Physical Exam   ED Triage Vitals [02/08/24 1707]   Temperature Heart Rate Respirations BP   36 °C (96.8 °F) 70 18 141/72      Pulse Ox Temp src Heart Rate Source Patient Position   96 % -- -- --      BP Location FiO2 (%)     -- 21 %       Physical Exam  Vitals and nursing note reviewed.   Constitutional:       General: He is not in acute distress.     Appearance: He is well-developed.   HENT:      Head: Normocephalic and atraumatic.   Eyes:      Conjunctiva/sclera: Conjunctivae normal.   Cardiovascular:      Rate and Rhythm: Normal rate and regular rhythm.      Heart sounds: No murmur heard.  Pulmonary:      Effort: Pulmonary effort is normal. No respiratory  distress.      Breath sounds: Normal breath sounds.   Abdominal:      Palpations: Abdomen is soft.      Tenderness: There is no abdominal tenderness.   Musculoskeletal:         General: No swelling.      Cervical back: Neck supple.      Comments: Bilateral AKA up to the pelvis.  Patient has no legs.  Mild tenderness on palpation to the left pelvis area but without crepitus or deformity.  Pulses intact.  No wounds in this area.   Skin:     General: Skin is warm and dry.      Capillary Refill: Capillary refill takes less than 2 seconds.   Neurological:      Mental Status: He is alert.   Psychiatric:         Mood and Affect: Mood normal.         ED Course & MDM   Diagnoses as of 02/08/24 1820   Arthralgia of left side of pelvis       Medical Decision Making  Vital signs reviewed, unremarkable at this time.  Patient is well-appearing and in no apparent distress.  Speaks full sentences without difficulty.  Patient denies any injuries or falls.  Given pain medication, per request, with Tylenol, Motrin, and Flexeril.  Offered x-ray and urinalysis but patient declined at this point.  Advised then to follow-up with primary care and to return with any new or worsening symptoms.  Given prescriptions to go home with.  States that he has a safe place to go and is in agreement with this plan.  Discharged in stable condition.        Procedure  Procedures     Alex Alston, CHRISTEN-CNP  02/08/24 1820

## 2024-02-08 NOTE — ED TRIAGE NOTES
Pt has hx of bilateral R and L AKA. He is noting bilateral hip pain that started yesterday. He denies falls/ trauma. He notes wound to R hip that he is concerned is swelling. Denies drainage.

## 2024-02-16 ENCOUNTER — HOSPITAL ENCOUNTER (EMERGENCY)
Facility: HOSPITAL | Age: 64
Discharge: HOME | End: 2024-02-17
Attending: STUDENT IN AN ORGANIZED HEALTH CARE EDUCATION/TRAINING PROGRAM
Payer: MEDICAID

## 2024-02-16 VITALS
HEART RATE: 80 BPM | SYSTOLIC BLOOD PRESSURE: 106 MMHG | TEMPERATURE: 98.4 F | OXYGEN SATURATION: 99 % | DIASTOLIC BLOOD PRESSURE: 55 MMHG | RESPIRATION RATE: 18 BRPM

## 2024-02-16 DIAGNOSIS — M79.18 ACUTE BUTTOCK PAIN: Primary | ICD-10-CM

## 2024-02-16 PROCEDURE — 96372 THER/PROPH/DIAG INJ SC/IM: CPT

## 2024-02-16 PROCEDURE — 2500000004 HC RX 250 GENERAL PHARMACY W/ HCPCS (ALT 636 FOR OP/ED): Mod: SE

## 2024-02-16 PROCEDURE — 99284 EMERGENCY DEPT VISIT MOD MDM: CPT | Performed by: STUDENT IN AN ORGANIZED HEALTH CARE EDUCATION/TRAINING PROGRAM

## 2024-02-16 PROCEDURE — 2500000001 HC RX 250 WO HCPCS SELF ADMINISTERED DRUGS (ALT 637 FOR MEDICARE OP): Mod: SE

## 2024-02-16 PROCEDURE — 99283 EMERGENCY DEPT VISIT LOW MDM: CPT

## 2024-02-16 RX ORDER — ACETAMINOPHEN 325 MG/1
650 TABLET ORAL ONCE
Status: COMPLETED | OUTPATIENT
Start: 2024-02-16 | End: 2024-02-16

## 2024-02-16 RX ORDER — METHOCARBAMOL 500 MG/1
500 TABLET, FILM COATED ORAL ONCE
Status: COMPLETED | OUTPATIENT
Start: 2024-02-16 | End: 2024-02-16

## 2024-02-16 RX ORDER — KETOROLAC TROMETHAMINE 30 MG/ML
30 INJECTION, SOLUTION INTRAMUSCULAR; INTRAVENOUS ONCE
Status: COMPLETED | OUTPATIENT
Start: 2024-02-16 | End: 2024-02-16

## 2024-02-16 RX ADMIN — ACETAMINOPHEN 650 MG: 325 TABLET ORAL at 17:25

## 2024-02-16 RX ADMIN — KETOROLAC TROMETHAMINE 30 MG: 30 INJECTION, SOLUTION INTRAMUSCULAR; INTRAVENOUS at 18:14

## 2024-02-16 RX ADMIN — METHOCARBAMOL 500 MG: 500 TABLET ORAL at 18:14

## 2024-02-16 ASSESSMENT — COLUMBIA-SUICIDE SEVERITY RATING SCALE - C-SSRS
1. IN THE PAST MONTH, HAVE YOU WISHED YOU WERE DEAD OR WISHED YOU COULD GO TO SLEEP AND NOT WAKE UP?: NO
2. HAVE YOU ACTUALLY HAD ANY THOUGHTS OF KILLING YOURSELF?: NO
6. HAVE YOU EVER DONE ANYTHING, STARTED TO DO ANYTHING, OR PREPARED TO DO ANYTHING TO END YOUR LIFE?: NO

## 2024-02-16 ASSESSMENT — PAIN DESCRIPTION - PAIN TYPE: TYPE: ACUTE PAIN

## 2024-02-16 ASSESSMENT — LIFESTYLE VARIABLES
EVER FELT BAD OR GUILTY ABOUT YOUR DRINKING: NO
EVER HAD A DRINK FIRST THING IN THE MORNING TO STEADY YOUR NERVES TO GET RID OF A HANGOVER: NO
HAVE PEOPLE ANNOYED YOU BY CRITICIZING YOUR DRINKING: NO
HAVE YOU EVER FELT YOU SHOULD CUT DOWN ON YOUR DRINKING: NO

## 2024-02-16 ASSESSMENT — PAIN SCALES - GENERAL: PAINLEVEL_OUTOF10: 9

## 2024-02-16 ASSESSMENT — PAIN - FUNCTIONAL ASSESSMENT: PAIN_FUNCTIONAL_ASSESSMENT: 0-10

## 2024-02-16 NOTE — PROGRESS NOTES
Link Macias is a 63 y.o. male on day 0 of admission presenting with No Principal Problem: There is no principal problem currently on the Problem List. Please update the Problem List and refresh..      Objective   Last Recorded Vitals  Blood pressure 157/75, pulse 83, temperature 36.1 °C (97 °F), temperature source Oral, resp. rate 16, SpO2 97 %.  Intake/Output last 3 Shifts:  No intake/output data recorded.    Assessment/Plan   Active Problems:  There are no active Hospital Problems.    .Patient was handed off to me from the previous team. For full history, physical, and prior ED course, please see previous provider note prior to patient handoff. This is an addendum to the record.    Briefly, this is a 63-year-old male with history of DM2, HLD, HTN, bilateral AKA 2006, and GERD presents for chief complaint of left buttock pain that started yesterday.  No concern for perirectal or perianal abscess at this time. Patient given toradol, robaxin, and tylenol. Patient reassessed and reports decreased pain. Patient reports has pain medications at home and PCP followup on Monday. Instructed patient on return precautions and patient feels comfortable with discharge.     Throughout the ED stay, the patient was monitored and re-examined for any changes in stability or symptomatology. The patient was instructed to return to the ED if any symptoms recurred, worsened, or if there was any additional concerns.    Pt seen and discussed with Dr. Gilmar Osorio MD  Emergency Medicine PGY-1       Melissa Osorio MD

## 2024-02-16 NOTE — ED PROVIDER NOTES
HPI   No chief complaint on file.      HPI  Link Macias is a 63-year-old male with history of DM2, HLD, HTN, bilateral AKA 2006, and GERD presents for chief complaint of left buttock pain that started yesterday. This pain is similar to when he presented to the ED 1 week ago. Describes the pain as a throbbing pain as a 10/10.  Denies any falls or injuries recently, and fevers.             No data recorded                   Patient History   No past medical history on file.  Past Surgical History:   Procedure Laterality Date    CT ABDOMEN PELVIS ANGIOGRAM W AND/OR WO IV CONTRAST  12/16/2022    CT ABDOMEN PELVIS ANGIOGRAM W AND/OR WO IV CONTRAST 12/16/2022 [facility]    CT ABDOMEN PELVIS ANGIOGRAM W AND/OR WO IV CONTRAST  8/18/2023    CT ABDOMEN PELVIS ANGIOGRAM W AND/OR WO IV CONTRAST [place]18/20[place] [facility] [place]    CT ABDOMEN PELVIS ANGIOGRAM W AND/OR WO IV CONTRAST  9/3/2023    CT ABDOMEN PELVIS ANGIOGRAM W AND/OR WO IV CONTRAST 9/3/2023 [facility] [place]     No family history on file.  Social History     Tobacco Use    Smoking status: Not on file    Smokeless tobacco: Not on file   Substance Use Topics    Alcohol use: Not on file    Drug use: Not on file       Physical Exam   ED Triage Vitals   Temp Pulse Resp BP   -- -- -- --      SpO2 Temp src Heart Rate Source Patient Position   -- -- -- --      BP Location FiO2 (%)     -- --       Constitutional:       Appearance: Normal appearance.   Pulmonary:      Effort: Pulmonary effort is normal.      Breath sounds: Normal breath sounds.   Abdominal:      General: Abdomen is flat.      Palpations: Abdomen is soft.   Musculoskeletal:      Comments: Bilateral Above the knee amputations.  No erythema, or fluctuant mass on left buttock.  Non tender to palpation.    Neurological:      General: No focal deficit present.      Mental Status: She is alert and oriented to person, place, and time.     ED Course & MDM    Link Macias is a 63-year-old male with history of DM2, HLD, HTN,  bilateral AKA 2006, and GERD presents for chief complaint of left buttock pain that started yesterday.  No concern for perirectal or perianal abscess at this time.  Patient is afebrile and no fluctuant mass, tenderness to palpation or erythema noted on physical exam.  Patient is hemodynamically normal and stable. Pain is likely musculoskeletal in nature. Will  discharge home after pain Is managed. There was a transition of care from myself to the oncoming provider. Disposition per above.         Jeff Macias MD  Resident  02/16/24 9544

## 2024-02-16 NOTE — ED TRIAGE NOTES
Pt arrived to ED via EMS from home with c/o rectal pain/pain with BM. Denies blood, constipation, or diarrhea.

## 2024-02-17 NOTE — DISCHARGE INSTRUCTIONS
Return if you have:  Temperature greater than 103  Severe uncontrolled pain  Signs of infection: pus, drainage, open wound  Any other questions or concerns you may have after discharge    In an emergency, call 911 or go to an Emergency Department at a nearby hospital

## 2024-03-05 ENCOUNTER — APPOINTMENT (OUTPATIENT)
Dept: RADIOLOGY | Facility: HOSPITAL | Age: 64
End: 2024-03-05
Payer: MEDICAID

## 2024-03-05 ENCOUNTER — HOSPITAL ENCOUNTER (EMERGENCY)
Facility: HOSPITAL | Age: 64
Discharge: HOME | End: 2024-03-05
Attending: EMERGENCY MEDICINE
Payer: MEDICAID

## 2024-03-05 ENCOUNTER — CLINICAL SUPPORT (OUTPATIENT)
Dept: EMERGENCY MEDICINE | Facility: HOSPITAL | Age: 64
End: 2024-03-05
Payer: MEDICAID

## 2024-03-05 VITALS
HEART RATE: 78 BPM | WEIGHT: 160 LBS | SYSTOLIC BLOOD PRESSURE: 155 MMHG | DIASTOLIC BLOOD PRESSURE: 82 MMHG | BODY MASS INDEX: 25.11 KG/M2 | OXYGEN SATURATION: 99 % | RESPIRATION RATE: 18 BRPM | HEIGHT: 67 IN | TEMPERATURE: 97.5 F

## 2024-03-05 DIAGNOSIS — R07.9 CHEST PAIN, UNSPECIFIED TYPE: Primary | ICD-10-CM

## 2024-03-05 LAB
ALBUMIN SERPL BCP-MCNC: 4.3 G/DL (ref 3.4–5)
ALP SERPL-CCNC: 83 U/L (ref 33–136)
ALT SERPL W P-5'-P-CCNC: 9 U/L (ref 10–52)
ANION GAP SERPL CALC-SCNC: 13 MMOL/L (ref 10–20)
AST SERPL W P-5'-P-CCNC: 18 U/L (ref 9–39)
BASOPHILS # BLD AUTO: 0.02 X10*3/UL (ref 0–0.1)
BASOPHILS NFR BLD AUTO: 0.4 %
BILIRUB SERPL-MCNC: 0.4 MG/DL (ref 0–1.2)
BUN SERPL-MCNC: 21 MG/DL (ref 6–23)
CALCIUM SERPL-MCNC: 9.5 MG/DL (ref 8.6–10.6)
CARDIAC TROPONIN I PNL SERPL HS: 5 NG/L (ref 0–53)
CARDIAC TROPONIN I PNL SERPL HS: 6 NG/L (ref 0–53)
CHLORIDE SERPL-SCNC: 103 MMOL/L (ref 98–107)
CO2 SERPL-SCNC: 24 MMOL/L (ref 21–32)
CREAT SERPL-MCNC: 0.84 MG/DL (ref 0.5–1.3)
EGFRCR SERPLBLD CKD-EPI 2021: >90 ML/MIN/1.73M*2
EOSINOPHIL # BLD AUTO: 0.04 X10*3/UL (ref 0–0.7)
EOSINOPHIL NFR BLD AUTO: 0.7 %
ERYTHROCYTE [DISTWIDTH] IN BLOOD BY AUTOMATED COUNT: 18.5 % (ref 11.5–14.5)
GLUCOSE SERPL-MCNC: 118 MG/DL (ref 74–99)
HCT VFR BLD AUTO: 43.7 % (ref 41–52)
HGB BLD-MCNC: 14.6 G/DL (ref 13.5–17.5)
IMM GRANULOCYTES # BLD AUTO: 0.01 X10*3/UL (ref 0–0.7)
IMM GRANULOCYTES NFR BLD AUTO: 0.2 % (ref 0–0.9)
LYMPHOCYTES # BLD AUTO: 1.63 X10*3/UL (ref 1.2–4.8)
LYMPHOCYTES NFR BLD AUTO: 30.1 %
MCH RBC QN AUTO: 23.9 PG (ref 26–34)
MCHC RBC AUTO-ENTMCNC: 33.4 G/DL (ref 32–36)
MCV RBC AUTO: 72 FL (ref 80–100)
MONOCYTES # BLD AUTO: 0.59 X10*3/UL (ref 0.1–1)
MONOCYTES NFR BLD AUTO: 10.9 %
NEUTROPHILS # BLD AUTO: 3.13 X10*3/UL (ref 1.2–7.7)
NEUTROPHILS NFR BLD AUTO: 57.7 %
NRBC BLD-RTO: 0 /100 WBCS (ref 0–0)
PLATELET # BLD AUTO: 253 X10*3/UL (ref 150–450)
POTASSIUM SERPL-SCNC: 3.7 MMOL/L (ref 3.5–5.3)
PROT SERPL-MCNC: 7.4 G/DL (ref 6.4–8.2)
RBC # BLD AUTO: 6.11 X10*6/UL (ref 4.5–5.9)
SODIUM SERPL-SCNC: 136 MMOL/L (ref 136–145)
WBC # BLD AUTO: 5.4 X10*3/UL (ref 4.4–11.3)

## 2024-03-05 PROCEDURE — 99285 EMERGENCY DEPT VISIT HI MDM: CPT | Performed by: EMERGENCY MEDICINE

## 2024-03-05 PROCEDURE — 80053 COMPREHEN METABOLIC PANEL: CPT | Performed by: EMERGENCY MEDICINE

## 2024-03-05 PROCEDURE — 93010 ELECTROCARDIOGRAM REPORT: CPT | Performed by: EMERGENCY MEDICINE

## 2024-03-05 PROCEDURE — 71045 X-RAY EXAM CHEST 1 VIEW: CPT | Performed by: RADIOLOGY

## 2024-03-05 PROCEDURE — 71045 X-RAY EXAM CHEST 1 VIEW: CPT

## 2024-03-05 PROCEDURE — 93005 ELECTROCARDIOGRAM TRACING: CPT

## 2024-03-05 PROCEDURE — 84484 ASSAY OF TROPONIN QUANT: CPT | Performed by: EMERGENCY MEDICINE

## 2024-03-05 PROCEDURE — 93005 ELECTROCARDIOGRAM TRACING: CPT | Mod: 59

## 2024-03-05 PROCEDURE — 36415 COLL VENOUS BLD VENIPUNCTURE: CPT | Performed by: EMERGENCY MEDICINE

## 2024-03-05 PROCEDURE — 99284 EMERGENCY DEPT VISIT MOD MDM: CPT | Mod: 25

## 2024-03-05 PROCEDURE — 84484 ASSAY OF TROPONIN QUANT: CPT

## 2024-03-05 PROCEDURE — 85025 COMPLETE CBC W/AUTO DIFF WBC: CPT | Performed by: EMERGENCY MEDICINE

## 2024-03-05 PROCEDURE — 36415 COLL VENOUS BLD VENIPUNCTURE: CPT

## 2024-03-05 RX ORDER — METHOCARBAMOL 500 MG/1
500 TABLET, FILM COATED ORAL 2 TIMES DAILY PRN
COMMUNITY
Start: 2024-02-26 | End: 2024-08-24

## 2024-03-05 RX ORDER — PREGABALIN 75 MG/1
75 CAPSULE ORAL 2 TIMES DAILY
COMMUNITY
Start: 2024-02-26 | End: 2024-05-26

## 2024-03-05 RX ORDER — METOPROLOL TARTRATE 25 MG/1
25 TABLET, FILM COATED ORAL 2 TIMES DAILY
COMMUNITY
Start: 2024-02-26 | End: 2024-03-05 | Stop reason: SDUPTHER

## 2024-03-05 RX ORDER — LOSARTAN POTASSIUM 100 MG/1
100 TABLET ORAL DAILY
COMMUNITY
Start: 2024-02-26 | End: 2024-03-05 | Stop reason: SDUPTHER

## 2024-03-05 RX ORDER — DULOXETIN HYDROCHLORIDE 60 MG/1
60 CAPSULE, DELAYED RELEASE ORAL DAILY
COMMUNITY
Start: 2024-02-26 | End: 2025-02-25

## 2024-03-05 RX ORDER — TALC
3 POWDER (GRAM) TOPICAL NIGHTLY
COMMUNITY
Start: 2024-01-25 | End: 2024-03-05 | Stop reason: SDUPTHER

## 2024-03-05 RX ORDER — HYDROXYZINE HYDROCHLORIDE 25 MG/1
25 TABLET, FILM COATED ORAL DAILY PRN
COMMUNITY

## 2024-03-05 RX ORDER — NAPROXEN 375 MG/1
375 TABLET ORAL EVERY 12 HOURS PRN
COMMUNITY
Start: 2024-01-25

## 2024-03-05 RX ORDER — VIT C/E/ZN/COPPR/LUTEIN/ZEAXAN 250MG-90MG
25 CAPSULE ORAL DAILY
COMMUNITY
Start: 2024-02-26 | End: 2025-02-25

## 2024-03-05 RX ORDER — OMEGA-3-ACID ETHYL ESTERS 1 G/1
2 CAPSULE, LIQUID FILLED ORAL DAILY
COMMUNITY
Start: 2024-02-26 | End: 2025-02-25

## 2024-03-05 RX ORDER — METOPROLOL TARTRATE 25 MG/1
25 TABLET, FILM COATED ORAL 2 TIMES DAILY
Qty: 60 TABLET | Refills: 5 | Status: SHIPPED | OUTPATIENT
Start: 2024-03-05 | End: 2024-09-01

## 2024-03-05 RX ORDER — ACETAMINOPHEN 325 MG/1
650 TABLET ORAL EVERY 6 HOURS PRN
COMMUNITY
Start: 2024-02-09

## 2024-03-05 RX ORDER — NITROGLYCERIN 0.4 MG/1
0.4 TABLET SUBLINGUAL EVERY 5 MIN PRN
COMMUNITY
Start: 2022-06-09 | End: 2024-03-05 | Stop reason: SDUPTHER

## 2024-03-05 RX ORDER — TALC
3 POWDER (GRAM) TOPICAL NIGHTLY
Qty: 30 TABLET | Refills: 0 | Status: SHIPPED | OUTPATIENT
Start: 2024-03-05

## 2024-03-05 RX ORDER — METFORMIN HYDROCHLORIDE 500 MG/1
500 TABLET ORAL
Qty: 60 TABLET | Refills: 0 | Status: SHIPPED | OUTPATIENT
Start: 2024-03-05 | End: 2025-03-05

## 2024-03-05 RX ORDER — FAMOTIDINE 20 MG/1
20 TABLET, FILM COATED ORAL DAILY
COMMUNITY
Start: 2024-02-26 | End: 2025-02-25

## 2024-03-05 RX ORDER — NITROGLYCERIN 0.4 MG/1
0.4 TABLET SUBLINGUAL EVERY 5 MIN PRN
Qty: 90 TABLET | Refills: 0 | Status: SHIPPED | OUTPATIENT
Start: 2024-03-05

## 2024-03-05 RX ORDER — OXCARBAZEPINE 150 MG/1
150 TABLET, FILM COATED ORAL 2 TIMES DAILY
Qty: 60 TABLET | Refills: 0 | Status: SHIPPED | OUTPATIENT
Start: 2024-03-05 | End: 2025-03-05

## 2024-03-05 RX ORDER — ASPIRIN 81 MG/1
81 TABLET ORAL DAILY
Qty: 30 TABLET | Refills: 0 | Status: SHIPPED | OUTPATIENT
Start: 2024-03-05

## 2024-03-05 RX ORDER — DOCUSATE SODIUM 100 MG/1
1 CAPSULE, LIQUID FILLED ORAL 2 TIMES DAILY PRN
COMMUNITY
Start: 2024-02-26

## 2024-03-05 RX ORDER — OXCARBAZEPINE 150 MG/1
150 TABLET, FILM COATED ORAL 2 TIMES DAILY
COMMUNITY
Start: 2024-02-26 | End: 2024-03-05 | Stop reason: SDUPTHER

## 2024-03-05 RX ORDER — ATORVASTATIN CALCIUM 80 MG/1
80 TABLET, FILM COATED ORAL NIGHTLY
Qty: 30 TABLET | Refills: 0 | Status: SHIPPED | OUTPATIENT
Start: 2024-03-05 | End: 2025-03-05

## 2024-03-05 RX ORDER — ATORVASTATIN CALCIUM 80 MG/1
80 TABLET, FILM COATED ORAL NIGHTLY
COMMUNITY
Start: 2024-02-26 | End: 2024-03-05 | Stop reason: SDUPTHER

## 2024-03-05 RX ORDER — ASPIRIN 81 MG/1
81 TABLET ORAL DAILY
COMMUNITY
End: 2024-03-05 | Stop reason: SDUPTHER

## 2024-03-05 RX ORDER — METFORMIN HYDROCHLORIDE 500 MG/1
500 TABLET ORAL
COMMUNITY
Start: 2024-02-26 | End: 2024-03-05 | Stop reason: SDUPTHER

## 2024-03-05 RX ORDER — GENTAMICIN SULFATE 1 MG/G
OINTMENT TOPICAL 3 TIMES DAILY
COMMUNITY
Start: 2024-02-13 | End: 2025-02-27

## 2024-03-05 RX ORDER — LOSARTAN POTASSIUM 100 MG/1
100 TABLET ORAL DAILY
Qty: 30 TABLET | Refills: 0 | Status: SHIPPED | OUTPATIENT
Start: 2024-03-05 | End: 2025-03-05

## 2024-03-05 ASSESSMENT — HEART SCORE
HEART SCORE: 3
TROPONIN: LESS THAN OR EQUAL TO NORMAL LIMIT
ECG: NORMAL
AGE: 45-64
HISTORY: SLIGHTLY SUSPICIOUS
RISK FACTORS: >2 RISK FACTORS OR HX OF ATHEROSCLEROTIC DISEASE

## 2024-03-05 ASSESSMENT — PAIN - FUNCTIONAL ASSESSMENT: PAIN_FUNCTIONAL_ASSESSMENT: 0-10

## 2024-03-05 ASSESSMENT — LIFESTYLE VARIABLES
HAVE PEOPLE ANNOYED YOU BY CRITICIZING YOUR DRINKING: YES
EVER HAD A DRINK FIRST THING IN THE MORNING TO STEADY YOUR NERVES TO GET RID OF A HANGOVER: NO
HAVE YOU EVER FELT YOU SHOULD CUT DOWN ON YOUR DRINKING: YES
EVER FELT BAD OR GUILTY ABOUT YOUR DRINKING: YES

## 2024-03-05 ASSESSMENT — PAIN SCALES - GENERAL: PAINLEVEL_OUTOF10: 9

## 2024-03-05 NOTE — PROGRESS NOTES
Link Macias is a 63 y.o. male discharging from  adult ED    Assessment/Plan   SW consulted to arrange transport. SW confirmed with Pt his address and phone number in the system are current. Pt will require stretcher transport. Roundtrip ride requested.       SILAS Moreno

## 2024-03-05 NOTE — PROGRESS NOTES
Provider consulted me to arrange a new cardiac appointment for the patient. I spoke to the patient at bedside. He stated he would like to come [facility] main for his appointment. He requested at least three days in advance notice so he can set up his ride. He knows how to arrange transportation for himself. I called the [facility] scheduling line. Cardiology appointment was made with Dr. Daigle on April 25th, at 2:00 PM. Provider and patient updated. Appointment also in EPIC.       Jeremias Phillips RN

## 2024-03-05 NOTE — PROGRESS NOTES
Pharmacy Medication History Review    Link Macias is a 63 y.o. male admitted for No Principal Problem: There is no principal problem currently on the Problem List. Please update the Problem List and refresh.. Pharmacy reviewed the patient's xcelg-wu-exutcfron medications and allergies for accuracy.    The list below reflects the updated PTA list. Comments regarding how patient may be taking medications differently can be found in the Admit Orders Activity  Prior to Admission Medications   Prescriptions Last Dose Informant   DULoxetine (Cymbalta) 60 mg DR capsule Unknown Self, Other   Sig: Take 1 capsule (60 mg) by mouth once daily.   OXcarbazepine (Trileptal) 150 mg tablet Unknown Self, Other   Sig: Take 1 tablet (150 mg) by mouth 2 times a day.   acetaminophen (Tylenol) 325 mg tablet Unknown Self, Other   Sig: Take 2 tablets (650 mg) by mouth every 6 hours if needed.   aspirin 81 mg EC tablet Unknown Self, Other   Sig: Take 1 tablet (81 mg) by mouth once daily.   atorvastatin (Lipitor) 80 mg tablet Unknown Self, Other   Sig: Take 1 tablet (80 mg) by mouth once daily at bedtime.   benzethonium chloride 0.13 % foam Unknown Self, Other   Sig: Apply topically twice a day.   cholecalciferol (Vitamin D-3) 25 MCG (1000 UT) capsule Unknown Self, Other   Sig: Take 1 capsule (25 mcg) by mouth once daily.   docusate sodium (Colace) 100 mg capsule Unknown Self, Other   Sig: Take 1 capsule (100 mg) by mouth 2 times a day as needed for constipation.   empagliflozin (Jardiance) 25 mg Unknown Self, Other   Sig: Take 1 tablet (25 mg) by mouth once daily with breakfast.   famotidine (Pepcid) 20 mg tablet Unknown Self, Other   Sig: Take 1 tablet (20 mg) by mouth once daily.   gentamicin (Garamycin) 0.1 % ointment Unknown Self, Other   Sig: Apply topically 3 times a day.   hydrOXYzine HCL (Atarax) 25 mg tablet Unknown Self, Other   Sig: Take 1 tablet (25 mg) by mouth once daily as needed for itching or anxiety.   losartan (Cozaar)  100 mg tablet Unknown Self, Other   Sig: Take 1 tablet (100 mg) by mouth once daily.   melatonin 3 mg tablet Unknown Self, Other   Sig: Take 1 tablet (3 mg) by mouth once daily at bedtime.   metFORMIN (Glucophage) 500 mg tablet Unknown Self, Other   Sig: Take 1 tablet (500 mg) by mouth 2 times a day with meals.   methocarbamol (Robaxin) 500 mg tablet Unknown Self, Other   Sig: Take 1 tablet (500 mg) by mouth 2 times a day as needed.   metoprolol tartrate (Lopressor) 25 mg tablet Unknown Self, Other   Sig: Take 1 tablet (25 mg) by mouth 2 times a day.   naproxen (Naprosyn) 375 mg tablet Unknown Self, Other   Sig: Take 1 tablet (375 mg) by mouth every 12 hours if needed.   nitroglycerin (Nitrostat) 0.4 mg SL tablet Unknown Self, Other   Sig: Place 1 tablet (0.4 mg) under the tongue every 5 minutes if needed for chest pain. UP TO 3 TIMES. IF NO RELIEF CALL 911.   omega-3 acid ethyl esters (Lovaza) 1 gram capsule Unknown Self, Other   Sig: Take 2 capsules (2 g) by mouth once daily.   pantoprazole (ProtoNix) 40 mg EC tablet Unknown Self, Other   Sig: TAKE 1 TABLET BY MOUTH TWO TIMES A DAY   pantoprazole (ProtoNix) 40 mg EC tablet Not Taking Self, Other   Sig: TAKE 1 TABLET BY MOUTH ONCE DAILY   Patient not taking: Reported on 3/5/2024   pregabalin (Lyrica) 75 mg capsule Unknown Self, Other   Sig: Take 1 capsule (75 mg) by mouth 2 times a day.      Facility-Administered Medications: None        The list below reflects the updated allergy list. Please review each documented allergy for additional clarification and justification.  Allergies  Reviewed by Sirisha Esparza, PharmD on 3/5/2024        Severity Reactions Comments    Penicillins Not Specified Unknown             Patient declines M2B at discharge. Pharmacy has been updated to [facility].    Sources used to complete the med history include: Patient interview - was unable to provide last doses, moderate historian of medications/ Pharmacy - [facility]/ Chart review - [facility] clinical summary/ [facility] - pregabalin 150mg #60/30 filled 10/16/23    Below are additional concerns with the patient's PTA list.  Medications with out of date fill history (prescription supply would have ran out >30 days ago) were marked in PTA list. Out of date fill history, along with being unable to provide last doses of medications, indicates patient is likely nonadherent to medications.     Sirisha Esparza PharmD  Transitions of Care Pharmacist  [facility]  Please reach out via Secure Chat for questions, or if no response call Cleo or [facility]

## 2024-03-05 NOTE — DISCHARGE INSTRUCTIONS
Please return to the emergency department immediately if you experience worsening chest pain or shortness of breath. Please follow-up with  cardiology Dr. Daigle on April 25 at 2:00 PM.  Your at home medications have been refilled and sent to [place] [facility].

## 2024-03-05 NOTE — ED PROVIDER NOTES
CC: Chest Pain     History provided by: Patient  Limitations to History: None    HPI:  Mr. Macias is a 63-year-old male with a PMH of bilateral AKA, GERD, HTN, HLD, PE, seizures, tobacco use disorder, and T2DM who presents to the ED for CC of chest pain.  Patient reports that earlier this morning he began to experience midline sternum chest pain.  The chest pain did not radiate and there were no associated symptoms.  He reports that he has a history of chest pain like this in the past which he takes nitroglycerin with relief.  He does not follow-up with an outpatient cardiologist and has ran out of his at home nitroglycerin.  Patient denies abdominal pain, shortness of breath, fevers, or cough.    External Records Reviewed: Previous ED records, inpatient records, and outpatient records  ???????????????????????????????????????????????????????????????  Triage Vitals:  T 36.4 °C (97.5 °F)  HR 82  /60  RR 18  O2 97 % None (Room air)    Physical Exam  Constitutional:       General: He is not in acute distress.     Appearance: He is not ill-appearing, toxic-appearing or diaphoretic.   HENT:      Head: Normocephalic and atraumatic.   Cardiovascular:      Rate and Rhythm: Normal rate.      Pulses:           Radial pulses are 2+ on the right side and 2+ on the left side.   Pulmonary:      Effort: No respiratory distress.      Breath sounds: Normal breath sounds.   Abdominal:      General: Abdomen is flat. There is no distension.      Tenderness: There is no abdominal tenderness. There is no guarding or rebound.   Musculoskeletal:      Comments: Bilateral AKA present   Skin:     General: Skin is warm and dry.      Capillary Refill: Capillary refill takes less than 2 seconds.   Neurological:      Mental Status: He is alert.   Psychiatric:         Behavior: Behavior is cooperative.        ???????????????????????????????????????????????????????????????  ED Course/Treatment/Medical Decision Making    EKG  Interpretation:   Normal sinus rhythm. Rate of 73 bpm. Normal axis. Normal intervals. No acute ST elevations, depressions, or T wave inversions.  Completed on 03-05-24 0340.  Unchanged when compared to previous EKG completed on 11-10-23.    Independent Interpretation of Studies:  I independently interpreted: CXR and EKG    Differential diagnoses considered include but ar not limited to: ACS, pneumonia, pneumothorax, viral illness, angina    Social Determinants Limiting Care:  None identified         ED Course:  ED Course as of 03/05/24 1421   Tue Mar 05, 2024   0950 HEMOGLOBIN: 14.6 [TE]   1053 Patient with a low risk heart score (3).  Delta troponin within normal limits.  ED pharmacy perform med rec and patient's home meds represcribed.  Patient set up with outpatient cardiology appointment.  Discharged in stable condition. [EG]      ED Course User Index  [EG] Suha Polanco MD  [TE] Jovanny Cortez, DO         Diagnoses as of 03/05/24 1421   Chest pain, unspecified type       MDM:  Patient is a 63-year-old male with above PMH who presents to the ED for CC of chest pain.  Upon arrival patient's vital signs remarkable for hypertension, he is nontoxic-appearing, and appears no acute distress.  Initial and delta troponin are within normal limits.  Initial EKG is nonischemic and not arrhythmogenic.  CBC and CMP are unremarkable.  CXR without acute cardiopulmonary process.  There is low concern for PE today as the patient is not hypoxic, tachycardic, and there is no pleuritic component of chest pain.  Upon examination the patient reports that his chest pain is much improved since this morning.  Pharmacy was asked to create a med rec for the patient.  Social work scheduled an outpatient cardiology visit within a few days for the patient.  The patient was discharged home in stable condition with appropriate follow-up care and Rx for his home medications.    Impression:  Chest pain    Disposition:  Discharged home    Patient  staffed and discussed with ED attending Dr. Verito Cortez, DO   Emergency Medicine, PGY-1       Procedures ? SmartLinks last updated 3/5/2024 7:58 AM          Jovanny Cortez,   Resident  03/05/24 0001

## 2024-03-21 ENCOUNTER — HOSPITAL ENCOUNTER (EMERGENCY)
Facility: HOSPITAL | Age: 64
Discharge: HOME | End: 2024-03-22
Attending: STUDENT IN AN ORGANIZED HEALTH CARE EDUCATION/TRAINING PROGRAM
Payer: MEDICAID

## 2024-03-21 VITALS
DIASTOLIC BLOOD PRESSURE: 78 MMHG | HEART RATE: 80 BPM | HEIGHT: 67 IN | OXYGEN SATURATION: 100 % | TEMPERATURE: 98.1 F | SYSTOLIC BLOOD PRESSURE: 155 MMHG | WEIGHT: 160 LBS | BODY MASS INDEX: 25.11 KG/M2 | RESPIRATION RATE: 18 BRPM

## 2024-03-21 DIAGNOSIS — G54.6 PHANTOM PAIN AFTER AMPUTATION OF LOWER EXTREMITY (MULTI): Primary | ICD-10-CM

## 2024-03-21 PROCEDURE — 99283 EMERGENCY DEPT VISIT LOW MDM: CPT | Performed by: STUDENT IN AN ORGANIZED HEALTH CARE EDUCATION/TRAINING PROGRAM

## 2024-03-21 PROCEDURE — 96372 THER/PROPH/DIAG INJ SC/IM: CPT

## 2024-03-21 PROCEDURE — 99283 EMERGENCY DEPT VISIT LOW MDM: CPT

## 2024-03-21 PROCEDURE — 2500000004 HC RX 250 GENERAL PHARMACY W/ HCPCS (ALT 636 FOR OP/ED): Mod: SE | Performed by: STUDENT IN AN ORGANIZED HEALTH CARE EDUCATION/TRAINING PROGRAM

## 2024-03-21 PROCEDURE — 96372 THER/PROPH/DIAG INJ SC/IM: CPT | Mod: GC | Performed by: STUDENT IN AN ORGANIZED HEALTH CARE EDUCATION/TRAINING PROGRAM

## 2024-03-21 PROCEDURE — 2500000001 HC RX 250 WO HCPCS SELF ADMINISTERED DRUGS (ALT 637 FOR MEDICARE OP): Mod: SE | Performed by: STUDENT IN AN ORGANIZED HEALTH CARE EDUCATION/TRAINING PROGRAM

## 2024-03-21 RX ORDER — ACETAMINOPHEN 325 MG/1
650 TABLET ORAL ONCE
Status: COMPLETED | OUTPATIENT
Start: 2024-03-21 | End: 2024-03-21

## 2024-03-21 RX ORDER — METHOCARBAMOL 500 MG/1
1000 TABLET, FILM COATED ORAL ONCE
Status: COMPLETED | OUTPATIENT
Start: 2024-03-21 | End: 2024-03-21

## 2024-03-21 RX ORDER — KETOROLAC TROMETHAMINE 15 MG/ML
15 INJECTION, SOLUTION INTRAMUSCULAR; INTRAVENOUS ONCE
Status: COMPLETED | OUTPATIENT
Start: 2024-03-21 | End: 2024-03-21

## 2024-03-21 RX ADMIN — ACETAMINOPHEN 650 MG: 325 TABLET ORAL at 19:30

## 2024-03-21 RX ADMIN — KETOROLAC TROMETHAMINE 15 MG: 15 INJECTION, SOLUTION INTRAMUSCULAR; INTRAVENOUS at 19:30

## 2024-03-21 RX ADMIN — METHOCARBAMOL 1000 MG: 500 TABLET ORAL at 19:30

## 2024-03-21 ASSESSMENT — PAIN - FUNCTIONAL ASSESSMENT: PAIN_FUNCTIONAL_ASSESSMENT: 0-10

## 2024-03-21 ASSESSMENT — PAIN SCALES - GENERAL: PAINLEVEL_OUTOF10: 4

## 2024-03-21 ASSESSMENT — PAIN DESCRIPTION - PROGRESSION: CLINICAL_PROGRESSION: GRADUALLY IMPROVING

## 2024-03-22 NOTE — ED PROVIDER NOTES
CC: Leg Pain     HPI:  Link Macias is a 63 y.o. male past medical history of T2DM, HLD, HTN, bilateral AKA in 2006 presenting to the emergency pain due to left buttock pain.  Patient has chronic pain in this area.  He states that it comes and goes.  He will have sudden episodes of severe pain that last for few seconds and then subside.  He is not sure what triggers these episodes.  It is not any particular position, and he does not recall any trauma to the area.  He denies any fevers, chills, nausea, vomiting, diarrhea, or other symptoms at this time.    Limitations to History: none  Additional History provided by: N/A    External Records Reviewed:  Recent available ED and inpatient notes reviewed in EMR.    PMHx/PSHx:  Per HPI.   - has no past medical history on file.  - has a past surgical history that includes CT angio abdomen pelvis w and or wo IV IV contrast (12/16/2022); CT angio abdomen pelvis w and or wo IV IV contrast (8/18/2023); and CT angio abdomen pelvis w and or wo IV IV contrast (9/3/2023).    Medications:  Reviewed in EMR. See EMR for complete list of medications and doses.    Allergies:  Penicillins    Social History:  - Tobacco:  reports that he has been smoking cigarettes. He has been smoking an average of .5 packs per day. He has never used smokeless tobacco.   - Alcohol:  has no history on file for alcohol use.   - Illicit Drugs:  has no history on file for drug use.     ROS:  Per HPI.     ???????????????????????????????????????????????????????????????  Triage Vitals:  T 36.7 °C (98.1 °F)  HR 76  /80  RR 15  O2 100 % None (Room air)    Physical Exam  Vitals and nursing note reviewed.   Constitutional:       Appearance: Normal appearance.   HENT:      Head: Normocephalic.      Mouth/Throat:      Mouth: Mucous membranes are moist.   Eyes:      Conjunctiva/sclera: Conjunctivae normal.   Cardiovascular:      Rate and Rhythm: Normal rate.   Pulmonary:      Effort: Pulmonary effort is  normal.   Abdominal:      General: Abdomen is flat.   Musculoskeletal:      Comments: Bilateral AKA's just inferior to the gluteal folds, no focal tenderness to palpation over the left buttock, no redness, swelling, induration, fluctuance.   Neurological:      Mental Status: He is alert and oriented to person, place, and time.   Psychiatric:         Mood and Affect: Mood normal.       ???????????????????????????????????????????????????????????????  ED Course:  Diagnoses as of 03/23/24 1901   Phantom pain after amputation of lower extremity (CMS/HCC)       EKG & Images:  Independently reviewed, See ED Course      MDM:  -The patient is a 63-year-old male with a past medical history of bilateral AKA's presenting to the emergency department today due to left buttock pain around the site of his AKA.  Skin was examined, no evidence of cellulitis, abscess, blood clot, trauma.  Pain was not reproducible.  Patient states that he symptoms gets relief from drops of medicine.  As such, was offered a shot of Toradol as well as Tylenol and Robaxin.  On reassessment, he had improvement in his pain.  As such, suspect that this was musculoskeletal in nature, less likely to be trauma, infection, or other cause.  As such the patient was stable for discharge with return precautions.  He is amenable to this plan.    Final diagnoses:   [G54.6] Phantom pain after amputation of lower extremity (CMS/HCC)         Social Determinants Limiting Care:  None identified    Disposition:  Discharge    Amy Arce MD   Emergency Medicine Resident, PGY3  [facility]     Disclaimer: This note was dictated by speech recognition. Minor errors in transcription may be present    Procedures ? youwho last updated 3/23/2024 7:01 PM        Amy Arce MD  Resident  03/23/24 1901

## 2024-04-04 ENCOUNTER — HOSPITAL ENCOUNTER (EMERGENCY)
Facility: HOSPITAL | Age: 64
Discharge: HOME | End: 2024-04-04
Attending: EMERGENCY MEDICINE
Payer: MEDICAID

## 2024-04-04 VITALS
HEART RATE: 71 BPM | OXYGEN SATURATION: 97 % | WEIGHT: 160 LBS | BODY MASS INDEX: 25.06 KG/M2 | DIASTOLIC BLOOD PRESSURE: 57 MMHG | RESPIRATION RATE: 18 BRPM | SYSTOLIC BLOOD PRESSURE: 119 MMHG

## 2024-04-04 DIAGNOSIS — M79.18 GLUTEAL PAIN: Primary | ICD-10-CM

## 2024-04-04 PROCEDURE — 2500000001 HC RX 250 WO HCPCS SELF ADMINISTERED DRUGS (ALT 637 FOR MEDICARE OP): Mod: SE | Performed by: STUDENT IN AN ORGANIZED HEALTH CARE EDUCATION/TRAINING PROGRAM

## 2024-04-04 PROCEDURE — 2500000004 HC RX 250 GENERAL PHARMACY W/ HCPCS (ALT 636 FOR OP/ED): Mod: SE | Performed by: STUDENT IN AN ORGANIZED HEALTH CARE EDUCATION/TRAINING PROGRAM

## 2024-04-04 PROCEDURE — 96372 THER/PROPH/DIAG INJ SC/IM: CPT | Performed by: STUDENT IN AN ORGANIZED HEALTH CARE EDUCATION/TRAINING PROGRAM

## 2024-04-04 PROCEDURE — 99283 EMERGENCY DEPT VISIT LOW MDM: CPT | Mod: 25

## 2024-04-04 PROCEDURE — 99283 EMERGENCY DEPT VISIT LOW MDM: CPT | Performed by: EMERGENCY MEDICINE

## 2024-04-04 RX ORDER — ACETAMINOPHEN 325 MG/1
975 TABLET ORAL ONCE
Status: DISCONTINUED | OUTPATIENT
Start: 2024-04-04 | End: 2024-04-04 | Stop reason: HOSPADM

## 2024-04-04 RX ORDER — KETOROLAC TROMETHAMINE 15 MG/ML
15 INJECTION, SOLUTION INTRAMUSCULAR; INTRAVENOUS ONCE
Status: COMPLETED | OUTPATIENT
Start: 2024-04-04 | End: 2024-04-04

## 2024-04-04 RX ORDER — METHOCARBAMOL 500 MG/1
1000 TABLET, FILM COATED ORAL ONCE
Status: COMPLETED | OUTPATIENT
Start: 2024-04-04 | End: 2024-04-04

## 2024-04-04 RX ADMIN — METHOCARBAMOL 1000 MG: 500 TABLET ORAL at 16:15

## 2024-04-04 RX ADMIN — KETOROLAC TROMETHAMINE 15 MG: 15 INJECTION, SOLUTION INTRAMUSCULAR; INTRAVENOUS at 16:15

## 2024-04-04 ASSESSMENT — PAIN DESCRIPTION - ORIENTATION
ORIENTATION: RIGHT

## 2024-04-04 ASSESSMENT — PAIN SCALES - GENERAL
PAINLEVEL_OUTOF10: 10 - WORST POSSIBLE PAIN
PAINLEVEL_OUTOF10: 8
PAINLEVEL_OUTOF10: 10 - WORST POSSIBLE PAIN

## 2024-04-04 ASSESSMENT — PAIN DESCRIPTION - DESCRIPTORS
DESCRIPTORS: SHARP
DESCRIPTORS: SHARP

## 2024-04-04 ASSESSMENT — PAIN - FUNCTIONAL ASSESSMENT
PAIN_FUNCTIONAL_ASSESSMENT: 0-10

## 2024-04-04 ASSESSMENT — LIFESTYLE VARIABLES
HAVE YOU EVER FELT YOU SHOULD CUT DOWN ON YOUR DRINKING: NO
HAVE PEOPLE ANNOYED YOU BY CRITICIZING YOUR DRINKING: NO
EVER FELT BAD OR GUILTY ABOUT YOUR DRINKING: NO
TOTAL SCORE: 0
EVER HAD A DRINK FIRST THING IN THE MORNING TO STEADY YOUR NERVES TO GET RID OF A HANGOVER: NO

## 2024-04-04 ASSESSMENT — PAIN DESCRIPTION - LOCATION
LOCATION: HIP

## 2024-04-04 ASSESSMENT — PAIN DESCRIPTION - PROGRESSION: CLINICAL_PROGRESSION: NOT CHANGED

## 2024-04-04 ASSESSMENT — PAIN DESCRIPTION - PAIN TYPE
TYPE: ACUTE PAIN
TYPE: ACUTE PAIN

## 2024-04-04 ASSESSMENT — PAIN DESCRIPTION - ONSET: ONSET: AWAKENED FROM SLEEP

## 2024-04-04 NOTE — DISCHARGE INSTRUCTIONS
You were seen in the emergency department for pain at your amputation site.  The site looks very good and does not appear infected based on our assessment.  Discuss with your doctor if you need to proceed with antibiotics. Monitor symptoms closely.  Return to the emergency department if you have worsening pain, drainage, fever, chills, any other concerning symptoms.

## 2024-04-04 NOTE — ED PROVIDER NOTES
CC: Hip Pain (/)     HPI:  Link Macias is a 63 y.o. male with a past medical history of T2DM, HLD, HTN, bilateral AKA, presenting to the emergency department today due to right gluteal pain.  Patient was recently treated for an abscess in the right groin.  He is supposed to be on antibiotics however he just picked them up today.  He has not been taking anything for his pain.  His pain is on the right side however not at the site of his abscess, slightly lower.  He has not taken anything for this yet.  He is requesting a shot of Toradol.  He denies any pain also.  He denies any trauma to the area.  He denies any other concerns at this time.    Limitations to History: none  Additional History provided by: N/A    External Records Reviewed:  Recent available ED and inpatient notes reviewed in EMR.        PMHx/PSHx:  Per HPI.   - has no past medical history on file.  - has a past surgical history that includes CT angio abdomen pelvis w and or wo IV IV contrast (12/16/2022); CT angio abdomen pelvis w and or wo IV IV contrast (8/18/2023); and CT angio abdomen pelvis w and or wo IV IV contrast (9/3/2023).    Medications:  Reviewed in EMR. See EMR for complete list of medications and doses.    Allergies:  Penicillins    Social History:  - Tobacco:  reports that he has been smoking cigarettes. He has been smoking an average of .5 packs per day. He has never used smokeless tobacco.   - Alcohol:  has no history on file for alcohol use.   - Illicit Drugs:  has no history on file for drug use.     ROS:  Per HPI.     ???????????????????????????????????????????????????????????????  Triage Vitals:  T    HR 72  /67  RR 18  O2 98 % None (Room air)    Physical Exam  Vitals and nursing note reviewed.   Constitutional:       Appearance: Normal appearance.   HENT:      Head: Normocephalic.      Mouth/Throat:      Mouth: Mucous membranes are moist.   Eyes:      Conjunctiva/sclera: Conjunctivae normal.   Cardiovascular:       Rate and Rhythm: Normal rate.   Pulmonary:      Effort: Pulmonary effort is normal.   Abdominal:      General: Abdomen is flat.   Musculoskeletal:      Comments: Bilateral AKA's, small 0.5 cm wound overlying the surgical scar on the right without surrounding induration or cellulitis, tenderness to palpation over the inferior aspect of the right stump without overlying redness, erythema, swelling, or obvious abnormality, left side otherwise unremarkable   Neurological:      Mental Status: He is alert and oriented to person, place, and time.   Psychiatric:         Mood and Affect: Mood normal.       ???????????????????????????????????????????????????????????????  ED Course:       EKG & Images:  Independently reviewed, See ED Course      MDM:  -The patient is a 63-year-old male with past medical history of bilateral AKA's presenting to the emergency department today due to pain in his right leg/gluteal region.  Did have an abscess recently drained and managed at [facility] over the right surgical scar however this is not where his pain is at.  Overall low suspicion for infection as a cause for this patient's pain.  More likely related to muscle spasm, or stump pain, potentially phantom leg pain.  Patient requested a dose of Toradol which was provided to him in addition to Robaxin.  Following this, he states that his pain is better thus he was stable for discharge home with return precautions.  He is amenable to this plan.  He was encouraged to continue taking the prescribed naproxen as well as a prescribed antibiotics for his abscess.    Final diagnoses:   None         Social Determinants Limiting Care:  None identified    Disposition:  Discharge    Amy Arce MD   Emergency Medicine Resident, PGY3  [facility]     Disclaimer: This note was dictated by speech recognition. Minor errors in transcription may be present    Procedures ? Apps[employer] last updated 4/7/2024 11:03 AM        Amy  MD Claude  Resident  04/07/24 8020

## 2024-04-04 NOTE — ED TRIAGE NOTES
Pt BIB EMS , pt complains of right hip pain that woke him up out of his sleep. Pt rates pain 10/10 and describes the pain as sharp.

## 2024-04-21 ENCOUNTER — HOSPITAL ENCOUNTER (EMERGENCY)
Facility: HOSPITAL | Age: 64
Discharge: HOME | End: 2024-04-21
Attending: EMERGENCY MEDICINE
Payer: MEDICAID

## 2024-04-21 ENCOUNTER — CLINICAL SUPPORT (OUTPATIENT)
Dept: EMERGENCY MEDICINE | Facility: HOSPITAL | Age: 64
End: 2024-04-21
Payer: MEDICAID

## 2024-04-21 ENCOUNTER — APPOINTMENT (OUTPATIENT)
Dept: RADIOLOGY | Facility: HOSPITAL | Age: 64
End: 2024-04-21
Payer: MEDICAID

## 2024-04-21 VITALS
WEIGHT: 155 LBS | RESPIRATION RATE: 18 BRPM | HEIGHT: 67 IN | SYSTOLIC BLOOD PRESSURE: 159 MMHG | OXYGEN SATURATION: 98 % | BODY MASS INDEX: 24.33 KG/M2 | HEART RATE: 75 BPM | DIASTOLIC BLOOD PRESSURE: 69 MMHG

## 2024-04-21 DIAGNOSIS — R07.9 CHEST PAIN, UNSPECIFIED TYPE: Primary | ICD-10-CM

## 2024-04-21 LAB
ABO GROUP (TYPE) IN BLOOD: NORMAL
ALBUMIN SERPL BCP-MCNC: 4.3 G/DL (ref 3.4–5)
ALP SERPL-CCNC: 69 U/L (ref 33–136)
ALT SERPL W P-5'-P-CCNC: 10 U/L (ref 10–52)
ANION GAP BLDV CALCULATED.4IONS-SCNC: 12 MMOL/L (ref 10–25)
ANION GAP SERPL CALC-SCNC: 15 MMOL/L (ref 10–20)
ANTIBODY SCREEN: NORMAL
APTT PPP: 38 SECONDS (ref 27–38)
AST SERPL W P-5'-P-CCNC: 20 U/L (ref 9–39)
ATRIAL RATE: 57 BPM
BASE EXCESS BLDV CALC-SCNC: -0.2 MMOL/L (ref -2–3)
BASOPHILS # BLD AUTO: 0.03 X10*3/UL (ref 0–0.1)
BASOPHILS NFR BLD AUTO: 0.7 %
BILIRUB SERPL-MCNC: 0.3 MG/DL (ref 0–1.2)
BODY TEMPERATURE: 37 DEGREES CELSIUS
BUN SERPL-MCNC: 31 MG/DL (ref 6–23)
CA-I BLDV-SCNC: 1.25 MMOL/L (ref 1.1–1.33)
CALCIUM SERPL-MCNC: 9.6 MG/DL (ref 8.6–10.6)
CARDIAC TROPONIN I PNL SERPL HS: 4 NG/L (ref 0–53)
CARDIAC TROPONIN I PNL SERPL HS: 4 NG/L (ref 0–53)
CHLORIDE BLDV-SCNC: 102 MMOL/L (ref 98–107)
CHLORIDE SERPL-SCNC: 103 MMOL/L (ref 98–107)
CO2 SERPL-SCNC: 24 MMOL/L (ref 21–32)
CREAT SERPL-MCNC: 0.9 MG/DL (ref 0.5–1.3)
EGFRCR SERPLBLD CKD-EPI 2021: >90 ML/MIN/1.73M*2
EOSINOPHIL # BLD AUTO: 0.06 X10*3/UL (ref 0–0.7)
EOSINOPHIL NFR BLD AUTO: 1.5 %
ERYTHROCYTE [DISTWIDTH] IN BLOOD BY AUTOMATED COUNT: 18.7 % (ref 11.5–14.5)
FLUAV RNA RESP QL NAA+PROBE: NOT DETECTED
FLUBV RNA RESP QL NAA+PROBE: NOT DETECTED
GLUCOSE BLDV-MCNC: 231 MG/DL (ref 74–99)
GLUCOSE SERPL-MCNC: 206 MG/DL (ref 74–99)
HCO3 BLDV-SCNC: 26.4 MMOL/L (ref 22–26)
HCT VFR BLD AUTO: 48.6 % (ref 41–52)
HCT VFR BLD EST: 47 % (ref 41–52)
HGB BLD-MCNC: 15.7 G/DL (ref 13.5–17.5)
HGB BLDV-MCNC: 15.6 G/DL (ref 13.5–17.5)
IMM GRANULOCYTES # BLD AUTO: 0.01 X10*3/UL (ref 0–0.7)
IMM GRANULOCYTES NFR BLD AUTO: 0.2 % (ref 0–0.9)
INHALED O2 CONCENTRATION: 21 %
INR PPP: 1 (ref 0.9–1.1)
LACTATE BLDV-SCNC: 2.6 MMOL/L (ref 0.4–2)
LIPASE SERPL-CCNC: 64 U/L (ref 9–82)
LYMPHOCYTES # BLD AUTO: 1.4 X10*3/UL (ref 1.2–4.8)
LYMPHOCYTES NFR BLD AUTO: 34.6 %
MAGNESIUM SERPL-MCNC: 2.04 MG/DL (ref 1.6–2.4)
MCH RBC QN AUTO: 24.6 PG (ref 26–34)
MCHC RBC AUTO-ENTMCNC: 32.3 G/DL (ref 32–36)
MCV RBC AUTO: 76 FL (ref 80–100)
MONOCYTES # BLD AUTO: 0.42 X10*3/UL (ref 0.1–1)
MONOCYTES NFR BLD AUTO: 10.4 %
NEUTROPHILS # BLD AUTO: 2.13 X10*3/UL (ref 1.2–7.7)
NEUTROPHILS NFR BLD AUTO: 52.6 %
NRBC BLD-RTO: 0 /100 WBCS (ref 0–0)
OXYHGB MFR BLDV: 53.9 % (ref 45–75)
P AXIS: 52 DEGREES
P OFFSET: 154 MS
P ONSET: 99 MS
PCO2 BLDV: 49 MM HG (ref 41–51)
PH BLDV: 7.34 PH (ref 7.33–7.43)
PLATELET # BLD AUTO: 216 X10*3/UL (ref 150–450)
PO2 BLDV: 40 MM HG (ref 35–45)
POTASSIUM BLDV-SCNC: 4.9 MMOL/L (ref 3.5–5.3)
POTASSIUM SERPL-SCNC: 4.7 MMOL/L (ref 3.5–5.3)
PR INTERVAL: 248 MS
PROT SERPL-MCNC: 7.3 G/DL (ref 6.4–8.2)
PROTHROMBIN TIME: 10.8 SECONDS (ref 9.8–12.8)
Q ONSET: 223 MS
QRS COUNT: 10 BEATS
QRS DURATION: 88 MS
QT INTERVAL: 376 MS
QTC CALCULATION(BAZETT): 365 MS
QTC FREDERICIA: 369 MS
R AXIS: 33 DEGREES
RBC # BLD AUTO: 6.37 X10*6/UL (ref 4.5–5.9)
RH FACTOR (ANTIGEN D): NORMAL
SAO2 % BLDV: 54 % (ref 45–75)
SARS-COV-2 RNA RESP QL NAA+PROBE: NOT DETECTED
SODIUM BLDV-SCNC: 135 MMOL/L (ref 136–145)
SODIUM SERPL-SCNC: 137 MMOL/L (ref 136–145)
T AXIS: 233 DEGREES
T OFFSET: 411 MS
VENTRICULAR RATE: 57 BPM
WBC # BLD AUTO: 4.1 X10*3/UL (ref 4.4–11.3)

## 2024-04-21 PROCEDURE — 83735 ASSAY OF MAGNESIUM: CPT | Performed by: STUDENT IN AN ORGANIZED HEALTH CARE EDUCATION/TRAINING PROGRAM

## 2024-04-21 PROCEDURE — 71275 CT ANGIOGRAPHY CHEST: CPT | Mod: FOREIGN READ | Performed by: RADIOLOGY

## 2024-04-21 PROCEDURE — 84484 ASSAY OF TROPONIN QUANT: CPT | Mod: 59 | Performed by: STUDENT IN AN ORGANIZED HEALTH CARE EDUCATION/TRAINING PROGRAM

## 2024-04-21 PROCEDURE — 84132 ASSAY OF SERUM POTASSIUM: CPT | Performed by: STUDENT IN AN ORGANIZED HEALTH CARE EDUCATION/TRAINING PROGRAM

## 2024-04-21 PROCEDURE — 71275 CT ANGIOGRAPHY CHEST: CPT

## 2024-04-21 PROCEDURE — 36415 COLL VENOUS BLD VENIPUNCTURE: CPT | Performed by: STUDENT IN AN ORGANIZED HEALTH CARE EDUCATION/TRAINING PROGRAM

## 2024-04-21 PROCEDURE — 99285 EMERGENCY DEPT VISIT HI MDM: CPT | Performed by: EMERGENCY MEDICINE

## 2024-04-21 PROCEDURE — 93005 ELECTROCARDIOGRAM TRACING: CPT | Mod: 59

## 2024-04-21 PROCEDURE — 2550000001 HC RX 255 CONTRASTS: Mod: SE | Performed by: STUDENT IN AN ORGANIZED HEALTH CARE EDUCATION/TRAINING PROGRAM

## 2024-04-21 PROCEDURE — 84484 ASSAY OF TROPONIN QUANT: CPT | Performed by: STUDENT IN AN ORGANIZED HEALTH CARE EDUCATION/TRAINING PROGRAM

## 2024-04-21 PROCEDURE — 86901 BLOOD TYPING SEROLOGIC RH(D): CPT | Performed by: STUDENT IN AN ORGANIZED HEALTH CARE EDUCATION/TRAINING PROGRAM

## 2024-04-21 PROCEDURE — 85610 PROTHROMBIN TIME: CPT | Performed by: STUDENT IN AN ORGANIZED HEALTH CARE EDUCATION/TRAINING PROGRAM

## 2024-04-21 PROCEDURE — 99285 EMERGENCY DEPT VISIT HI MDM: CPT | Mod: 25

## 2024-04-21 PROCEDURE — 93005 ELECTROCARDIOGRAM TRACING: CPT

## 2024-04-21 PROCEDURE — 83690 ASSAY OF LIPASE: CPT | Performed by: STUDENT IN AN ORGANIZED HEALTH CARE EDUCATION/TRAINING PROGRAM

## 2024-04-21 PROCEDURE — 85025 COMPLETE CBC W/AUTO DIFF WBC: CPT | Performed by: STUDENT IN AN ORGANIZED HEALTH CARE EDUCATION/TRAINING PROGRAM

## 2024-04-21 PROCEDURE — 74174 CTA ABD&PLVS W/CONTRAST: CPT | Mod: FOREIGN READ | Performed by: RADIOLOGY

## 2024-04-21 PROCEDURE — 87636 SARSCOV2 & INF A&B AMP PRB: CPT | Performed by: STUDENT IN AN ORGANIZED HEALTH CARE EDUCATION/TRAINING PROGRAM

## 2024-04-21 RX ORDER — ACETAMINOPHEN 325 MG/1
975 TABLET ORAL ONCE
Status: COMPLETED | OUTPATIENT
Start: 2024-04-21 | End: 2024-04-21

## 2024-04-21 RX ADMIN — ACETAMINOPHEN 975 MG: 325 TABLET ORAL at 06:55

## 2024-04-21 RX ADMIN — IOHEXOL 97 ML: 350 INJECTION, SOLUTION INTRAVENOUS at 06:08

## 2024-04-21 ASSESSMENT — PAIN SCALES - GENERAL
PAINLEVEL_OUTOF10: 0 - NO PAIN
PAINLEVEL_OUTOF10: 0 - NO PAIN

## 2024-04-21 ASSESSMENT — LIFESTYLE VARIABLES
TOTAL SCORE: 0
EVER FELT BAD OR GUILTY ABOUT YOUR DRINKING: NO
EVER HAD A DRINK FIRST THING IN THE MORNING TO STEADY YOUR NERVES TO GET RID OF A HANGOVER: NO
HAVE PEOPLE ANNOYED YOU BY CRITICIZING YOUR DRINKING: NO
HAVE YOU EVER FELT YOU SHOULD CUT DOWN ON YOUR DRINKING: NO

## 2024-04-21 ASSESSMENT — PAIN - FUNCTIONAL ASSESSMENT: PAIN_FUNCTIONAL_ASSESSMENT: 0-10

## 2024-04-21 NOTE — DISCHARGE INSTRUCTIONS
Call to schedule an appointment with your Vascular surgeon to follow up for your chronic clotting. Return to the ER if you have worsening chest or back pain, trouble breathing, or other new symptoms.

## 2024-04-21 NOTE — ED PROVIDER NOTES
HPI   Chief Complaint   Patient presents with   • Back Pain   • Chest Pain       HPI     Patient is a 63-year-old male with a past medical history of diabetes, hyperlipidemia, hypertension, PAD s/p bilateral AKA, bilateral aortoiliofemoral bypass presenting to the emergency department with acute onset back pain.  Patient states that the pain woke him up from sleep and is now radiating to his abdomen and his chest.  Denies any nausea or vomiting or fever or chills or diaphoresis.  Denies any recent infection, shortness of breath.  Denies any pain with urination, increase in urinary frequency, blood in urine or stool, diarrhea, constipation.               [place] Coma Scale Score: 15                     Patient History   History reviewed. No pertinent past medical history.  Past Surgical History:   Procedure Laterality Date   • CT ABDOMEN PELVIS ANGIOGRAM W AND/OR WO IV CONTRAST  12/16/2022    CT ABDOMEN PELVIS ANGIOGRAM W AND/OR WO IV CONTRAST 12/16/2022 [facility]   • CT ABDOMEN PELVIS ANGIOGRAM W AND/OR WO IV CONTRAST  8/18/2023    CT ABDOMEN PELVIS ANGIOGRAM W AND/OR WO IV CONTRAST 8/18/2023 [facility] CT   • CT ABDOMEN PELVIS ANGIOGRAM W AND/OR WO IV CONTRAST  9/3/2023    CT ABDOMEN PELVIS ANGIOGRAM W AND/OR WO IV CONTRAST 9/3/2023 [facility] CT     No family history on file.  Social History     Tobacco Use   • Smoking status: Every Day     Current packs/day: 0.50     Types: Cigarettes   • Smokeless tobacco: Never   Substance Use Topics   • Alcohol use: Not on file   • Drug use: Not on file       Physical Exam   ED Triage Vitals   Temp Heart Rate Respirations BP   -- 04/21/24 0521 04/21/24 0521 04/21/24 0521    62 15 (!) 186/66      Pulse Ox Temp src Heart Rate Source Patient Position   04/21/24 0521 -- 04/21/24 0537 --   100 %  Monitor       BP Location FiO2 (%)     -- 04/21/24 0521      21 %       Physical Exam  Constitutional:       General: He is not in acute distress.     Appearance: He is obese. He is not  ill-appearing, toxic-appearing or diaphoretic.   HENT:      Head: Normocephalic and atraumatic.      Nose: Nose normal.   Eyes:      General: No scleral icterus.        Right eye: No discharge.         Left eye: No discharge.      Conjunctiva/sclera: Conjunctivae normal.   Cardiovascular:      Rate and Rhythm: Normal rate.      Heart sounds: No murmur heard.     No friction rub. No gallop.   Pulmonary:      Effort: No respiratory distress.      Breath sounds: Normal breath sounds. No decreased breath sounds, wheezing or rhonchi.   Chest:      Chest wall: No mass, deformity, tenderness, crepitus or edema. There is no dullness to percussion.   Abdominal:      General: There is no distension.      Palpations: Abdomen is soft.      Comments: Mild diffuse tenderness to palpation in all 4 quadrants without rebound or guarding.  Midline vertical surgical incision   Musculoskeletal:         General: No deformity or signs of injury.      Cervical back: Neck supple. No rigidity.      Comments: Bilateral AKA is present   Skin:     General: Skin is warm and dry.   Neurological:      Mental Status: He is alert and oriented to person, place, and time.   Psychiatric:         Mood and Affect: Mood normal. Mood is not anxious.         Behavior: Behavior normal. Behavior is not agitated.         ED Course & MDM   ED Course as of 04/23/24 0702   Sun Apr 21, 2024   0810 CT angio chest abdomen pelvis [MJ]   0959 Patient signed out to me at shift change pending CTA CAP results and delta troponin for chest and back pain. Patient seen sleeping during evaluations and easily woken. Does not endorse pain at this time. 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 He was advised to follow up with his Vascular Surgeon for management but given his negative delta and no new findings on imaging, he is stable to be discharged home. Will return to the  ED for worsening symptoms.  [MJ]      ED Course User Index  [MJ] Jose Rico DO         Diagnoses as of 04/23/24 0702   Chest pain, unspecified type   EKG showing normal sinus bradycardia, normal axis, normal intervals except for a first-degree AV block, no signs of acute ST elevation or depression     Medical Decision Making  Patient is a 63-year-old male presenting to the emergency department with back chest and abdominal pain.  Patient was sent for a CTA of the chest abdomen and pelvis.  Per my independent interpretation, patient does have evidence of a clot that is infrarenal in nature, consistent with patient's known history of aorto iliofemoral bypass no obvious extension into the suprarenal space or evidence of a dissection flap present.  Initial EKG showing no evidence of acute STEMI.  Initial troponin negative.  No evidence of YVETTE or acidosis or significant anemia.  Patient is well-appearing at bedside and in no acute distress.  Will be handed off pending final read of CTA imaging and delta troponin testing.  Hemodynamically stable, handed off in stable condition.    Procedure  Procedures     Mauro Camargo MD  Resident  04/21/24 0658       Mauro Camargo MD  Resident  04/21/24 0721       Mauro Camargo MD  Resident  04/23/24 0703

## 2024-04-21 NOTE — PROGRESS NOTES
Patient has been identified as having an emergent need for administration of iodinated contrast for CT scan prior to result of laboratory studies OR despite known elevated GFR due to possibility of life and/or limb threatening pathology.    I acknowledge the risks and benefits of emergently proceeding with contrast administration including that, at present, it is the position of the [employer] that contrast induced nephropathy (MARYANNE) is a rare but possible consequence. At this time the benefits of proceeding with contrast administration outweigh the risks.    Attempts will be made to mitigate possible MARYANNE risk with IV fluid hydration if able.    Mauro Camargo MD  Emergency Medicine

## 2024-04-21 NOTE — ED TRIAGE NOTES
Pt came in via EMS after being woken up by increased back pain that is typically chronic. Pt states that the pain began radiating to his stomach and lower chest. Denies SOB, diaphoresis, numbness, N/V.

## 2024-04-24 PROBLEM — S31.103A: Status: ACTIVE | Noted: 2018-12-17

## 2024-04-24 PROBLEM — G54.7 PHANTOM LIMB SYNDROME (MULTI): Status: ACTIVE | Noted: 2024-04-24

## 2024-04-24 PROBLEM — T81.89XA NON-HEALING SURGICAL WOUND OF RIGHT GROIN: Status: ACTIVE | Noted: 2020-01-28

## 2024-04-24 PROBLEM — L20.89 OTHER ATOPIC DERMATITIS: Status: ACTIVE | Noted: 2018-05-15

## 2024-04-24 PROBLEM — E11.51 DIABETES MELLITUS TYPE 2 WITH PERIPHERAL ARTERY DISEASE (MULTI): Status: ACTIVE | Noted: 2018-12-17

## 2024-04-24 PROBLEM — G54.6 PHANTOM PAIN AFTER AMPUTATION OF LOWER EXTREMITY (MULTI): Status: ACTIVE | Noted: 2024-04-24

## 2024-04-24 PROBLEM — M25.522 LEFT ELBOW PAIN: Status: ACTIVE | Noted: 2017-11-01

## 2024-04-24 PROBLEM — M19.029 ARTHRITIS OF ELBOW: Status: ACTIVE | Noted: 2017-12-08

## 2024-04-24 PROBLEM — K52.9 COLITIS: Status: ACTIVE | Noted: 2024-04-24

## 2024-04-24 PROBLEM — R42 LIGHTHEADEDNESS: Status: ACTIVE | Noted: 2023-04-24

## 2024-04-24 PROBLEM — U07.1 DISEASE DUE TO SEVERE ACUTE RESPIRATORY SYNDROME CORONAVIRUS 2 (SARS-COV-2): Status: ACTIVE | Noted: 2024-04-24

## 2024-04-24 PROBLEM — K92.1 HEMATOCHEZIA: Status: ACTIVE | Noted: 2024-04-24

## 2024-04-24 PROBLEM — E11.10 DIABETIC KETOACIDOSIS (MULTI): Status: ACTIVE | Noted: 2024-04-24

## 2024-04-24 PROBLEM — Z86.39 HISTORY OF DIABETES MELLITUS: Status: ACTIVE | Noted: 2024-04-24

## 2024-04-24 PROBLEM — M54.9 BACKACHE: Status: ACTIVE | Noted: 2024-04-24
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
## 2024-04-24 PROBLEM — M79.89 SYMPTOM OF LEG SWELLING: Status: ACTIVE | Noted: 2024-04-24

## 2024-04-24 PROBLEM — R53.1 WEAKNESS PRESENT: Status: ACTIVE | Noted: 2024-04-24

## 2024-04-24 PROBLEM — K59.00 CONSTIPATION: Status: ACTIVE | Noted: 2024-04-24

## 2024-04-24 PROBLEM — M79.18 CHRONIC MUSCULOSKELETAL PAIN: Status: ACTIVE | Noted: 2024-04-24

## 2024-04-24 PROBLEM — E86.0 DEHYDRATION: Status: ACTIVE | Noted: 2023-11-10

## 2024-04-24 PROBLEM — G89.29 CHRONIC PAIN: Status: ACTIVE | Noted: 2024-04-24

## 2024-04-24 PROBLEM — S37.009A INJURY OF KIDNEY: Status: ACTIVE | Noted: 2024-04-24

## 2024-04-24 PROBLEM — L89.309 PRESSURE INJURY OF SKIN OF BUTTOCK: Status: ACTIVE | Noted: 2024-04-24

## 2024-04-24 PROBLEM — L03.90 CELLULITIS: Status: ACTIVE | Noted: 2023-07-19

## 2024-04-24 PROBLEM — Z89.619: Status: ACTIVE | Noted: 2024-04-24

## 2024-04-24 PROBLEM — K21.9 GASTROESOPHAGEAL REFLUX DISEASE WITHOUT ESOPHAGITIS: Status: ACTIVE | Noted: 2017-09-01

## 2024-04-24 PROBLEM — I96: Status: ACTIVE | Noted: 2023-02-20

## 2024-04-24 PROBLEM — Z89.622: Status: ACTIVE | Noted: 2019-04-22

## 2024-04-24 PROBLEM — E78.5 HYPERLIPIDEMIA: Status: ACTIVE | Noted: 2019-12-30

## 2024-04-24 PROBLEM — G89.29 CHRONIC MUSCULOSKELETAL PAIN: Status: ACTIVE | Noted: 2024-04-24

## 2024-04-24 PROBLEM — F17.200 NICOTINE DEPENDENCE: Status: ACTIVE | Noted: 2019-04-22

## 2024-04-24 PROBLEM — I10 ESSENTIAL (PRIMARY) HYPERTENSION: Status: ACTIVE | Noted: 2023-09-07

## 2024-04-24 PROBLEM — L30.4 INTERTRIGO: Status: ACTIVE | Noted: 2024-04-24

## 2024-04-24 PROBLEM — T82.7XXA VASCULAR GRAFT INFECTION (CMS-HCC): Status: ACTIVE | Noted: 2020-01-30

## 2024-04-24 PROBLEM — R61 GENERALIZED HYPERHIDROSIS: Status: ACTIVE | Noted: 2023-02-20

## 2024-04-24 PROBLEM — Z86.79 HISTORY OF CARDIOVASCULAR DISORDER: Status: ACTIVE | Noted: 2024-04-24

## 2024-04-24 PROBLEM — S30.0XXA CONTUSION OF SACRAL REGION: Status: ACTIVE | Noted: 2024-04-24

## 2024-04-24 RX ORDER — HYDROCORTISONE 25 MG/G
CREAM TOPICAL
COMMUNITY
Start: 2023-09-07

## 2024-04-24 RX ORDER — PREGABALIN 150 MG/1
CAPSULE ORAL
COMMUNITY
Start: 2023-10-16

## 2024-04-24 RX ORDER — IBUPROFEN 400 MG/1
400 TABLET ORAL 4 TIMES DAILY
COMMUNITY
Start: 2023-05-04

## 2024-04-24 RX ORDER — HYDROCORTISONE 25 MG/G
CREAM TOPICAL
COMMUNITY
Start: 2024-04-02

## 2024-04-25 ENCOUNTER — OFFICE VISIT (OUTPATIENT)
Dept: CARDIOLOGY | Facility: HOSPITAL | Age: 64
End: 2024-04-25
Payer: MEDICAID

## 2024-04-25 VITALS
DIASTOLIC BLOOD PRESSURE: 65 MMHG | OXYGEN SATURATION: 95 % | BODY MASS INDEX: 23.49 KG/M2 | HEART RATE: 61 BPM | SYSTOLIC BLOOD PRESSURE: 115 MMHG | WEIGHT: 150 LBS

## 2024-04-25 DIAGNOSIS — R07.9 CHEST PAIN, UNSPECIFIED TYPE: ICD-10-CM

## 2024-04-25 DIAGNOSIS — E78.2 MIXED HYPERLIPIDEMIA: ICD-10-CM

## 2024-04-25 DIAGNOSIS — R01.1 HEART MURMUR: Primary | ICD-10-CM

## 2024-04-25 PROCEDURE — 99204 OFFICE O/P NEW MOD 45 MIN: CPT | Performed by: INTERNAL MEDICINE

## 2024-04-25 PROCEDURE — 3074F SYST BP LT 130 MM HG: CPT | Performed by: INTERNAL MEDICINE

## 2024-04-25 PROCEDURE — 4010F ACE/ARB THERAPY RXD/TAKEN: CPT | Performed by: INTERNAL MEDICINE

## 2024-04-25 PROCEDURE — 3078F DIAST BP <80 MM HG: CPT | Performed by: INTERNAL MEDICINE

## 2024-04-25 PROCEDURE — 99214 OFFICE O/P EST MOD 30 MIN: CPT | Performed by: INTERNAL MEDICINE

## 2024-04-25 ASSESSMENT — PATIENT HEALTH QUESTIONNAIRE - PHQ9
2. FEELING DOWN, DEPRESSED OR HOPELESS: NOT AT ALL
SUM OF ALL RESPONSES TO PHQ9 QUESTIONS 1 AND 2: 0
1. LITTLE INTEREST OR PLEASURE IN DOING THINGS: NOT AT ALL

## 2024-04-25 ASSESSMENT — ENCOUNTER SYMPTOMS: HYPERTENSION: 1

## 2024-04-25 NOTE — PROGRESS NOTES
Subjective   Link Macias is a 63 y.o. male.    Past medical history  Hyperlipidemia on atorvastatin 80  Diabetes type 2  Hypertension  Amputation of the bilateral lower extremities in 2006  Bilateral aortofemoral bypass    Tobacco about 2 cigarettes sometimes.  Patient not providing clear information    Alcohol/drugs: None    Social: Uses a wheelchair because of bilateral lower extremity amputation.  Have a home health aide who helps him around.        Chief Complaint:  Hypertension (NPV - hypertension, chest pain )    Hypertension        63-year-old gentleman is referred for hypertension.  His blood pressure is well-managed and he is compliant to medication.  Not sure when patient has hypertension as he is just on baby dose of metoprolol.    Patient himself denies any shortness of breath or orthopnea.    On very rare occasions patient gets chest pain like once every 3 to 6 months where he takes sublingual nitroglycerin.  The pain is very mild in nature, sharp in character located retrosternal with no radiation.  Patient tells me that he has been using sublingual nitroglycerin for the past 3 to 4 years.    Patient sometimes get palpitation specifically right after smoking for a very short duration.  Otherwise no palpitations.    ROS  No major complaints    Objective   Constitutional:       Appearance: Not in distress.   Neck:      Vascular: JVD normal.   Pulmonary:      Effort: Pulmonary effort is normal.      Breath sounds: Normal breath sounds.   Cardiovascular:      PMI at left midclavicular line. Normal rate. Regular rhythm. Normal S1. Normal S2.       Murmurs: There is a systolic murmur.   Edema:     Peripheral edema absent.   Abdominal:      General: Bowel sounds are normal.      Palpations: Abdomen is soft.   Neurological:      General: No focal deficit present.      Mental Status: Alert and oriented to person, place and time.       Vitals:    04/25/24 1412   BP: 115/65   Pulse: 61   SpO2: 95%         Lab  "Review:   Lab Results   Component Value Date     04/21/2024    K 4.7 04/21/2024     04/21/2024    CO2 24 04/21/2024    BUN 31 (H) 04/21/2024    CREATININE 0.90 04/21/2024    GLUCOSE 206 (H) 04/21/2024    CALCIUM 9.6 04/21/2024     Lab Results   Component Value Date    WBC 4.1 (L) 04/21/2024    HGB 15.7 04/21/2024    HCT 48.6 04/21/2024    MCV 76 (L) 04/21/2024     04/21/2024     No results found for: \"CHOL\", \"TRIG\", \"HDL\", \"LDLDIRECT\"    Assessment/Plan   There were no encounter diagnoses.  Patient is referred to cardiology with no specific symptoms.  On asking patient informed about some atypical chest pain on very rare occasions.  He has multiple risk factors in the form of hypertension, hyperlipidemia, smoking history, peripheral artery disease, diabetes.  Patient has history of amputation of bilateral lower extremity since 2006 and is using a wheelchair.  On physical examination he has a murmur.  There is citation of hypertension diagnosis which probably is not correct as his blood pressure is well-managed on a very baby dose of metoprolol.    Chest pain: patient has multiple risk factors in the form of peripheral artery disease hypertension, diabetes, hyperlipidemia and history of smoking.  His chest pain is very rare and occasional.  Is not affecting his quality of life.  Do not think there is any indication of revascularization or assessment.  Given patient's severe peripheral artery disease, I am not sure if that is adequate enough access for her revascularization procedure if it comes to that.    Hypertension not sure if patient has hypertension.  He is on baby dose of beta-blocker.  Continue management    Hyperlipidemia.  There is no lipid profile in the system.  He is on high-dose atorvastatin.  Continue that      Murmur: Patient has a systolic murmur.  Referred for an echocardiogram for further assessment    Follow-up in 6 months and before if needed        "

## 2024-07-29 ENCOUNTER — HOSPITAL ENCOUNTER (EMERGENCY)
Facility: HOSPITAL | Age: 64
Discharge: HOME | End: 2024-07-30
Attending: EMERGENCY MEDICINE
Payer: MEDICAID

## 2024-07-29 VITALS
HEIGHT: 67 IN | SYSTOLIC BLOOD PRESSURE: 149 MMHG | OXYGEN SATURATION: 95 % | BODY MASS INDEX: 23.54 KG/M2 | HEART RATE: 50 BPM | WEIGHT: 150 LBS | RESPIRATION RATE: 18 BRPM | TEMPERATURE: 97.4 F | DIASTOLIC BLOOD PRESSURE: 54 MMHG

## 2024-07-29 DIAGNOSIS — G54.6 PHANTOM LIMB PAIN (MULTI): Primary | ICD-10-CM

## 2024-07-29 PROCEDURE — 99284 EMERGENCY DEPT VISIT MOD MDM: CPT

## 2024-07-29 PROCEDURE — 2500000001 HC RX 250 WO HCPCS SELF ADMINISTERED DRUGS (ALT 637 FOR MEDICARE OP): Mod: SE

## 2024-07-29 PROCEDURE — 2500000004 HC RX 250 GENERAL PHARMACY W/ HCPCS (ALT 636 FOR OP/ED): Mod: SE

## 2024-07-29 PROCEDURE — 96372 THER/PROPH/DIAG INJ SC/IM: CPT

## 2024-07-29 PROCEDURE — 99283 EMERGENCY DEPT VISIT LOW MDM: CPT

## 2024-07-29 RX ORDER — METHOCARBAMOL 500 MG/1
1000 TABLET, FILM COATED ORAL ONCE
Status: COMPLETED | OUTPATIENT
Start: 2024-07-29 | End: 2024-07-29

## 2024-07-29 RX ORDER — KETOROLAC TROMETHAMINE 30 MG/ML
30 INJECTION, SOLUTION INTRAMUSCULAR; INTRAVENOUS ONCE
Status: COMPLETED | OUTPATIENT
Start: 2024-07-29 | End: 2024-07-29

## 2024-07-29 RX ORDER — GABAPENTIN 100 MG/1
100 CAPSULE ORAL 3 TIMES DAILY
Qty: 42 CAPSULE | Refills: 0 | Status: SHIPPED | OUTPATIENT
Start: 2024-07-29 | End: 2024-08-12

## 2024-07-29 RX ORDER — ACETAMINOPHEN 325 MG/1
975 TABLET ORAL ONCE
Status: COMPLETED | OUTPATIENT
Start: 2024-07-29 | End: 2024-07-29

## 2024-07-29 RX ORDER — GABAPENTIN 100 MG/1
100 CAPSULE ORAL ONCE
Status: COMPLETED | OUTPATIENT
Start: 2024-07-29 | End: 2024-07-29

## 2024-07-29 SDOH — HEALTH STABILITY: MENTAL HEALTH: SUICIDE ASSESSMENT: ADULT (C-SSRS)

## 2024-07-29 SDOH — HEALTH STABILITY: MENTAL HEALTH: HAVE YOU EVER DONE ANYTHING, STARTED TO DO ANYTHING, OR PREPARED TO DO ANYTHING TO END YOUR LIFE?: NO

## 2024-07-29 SDOH — HEALTH STABILITY: MENTAL HEALTH: HAVE YOU WISHED YOU WERE DEAD OR WISHED YOU COULD GO TO SLEEP AND NOT WAKE UP?: NO

## 2024-07-29 SDOH — HEALTH STABILITY: MENTAL HEALTH: HAVE YOU ACTUALLY HAD ANY THOUGHTS OF KILLING YOURSELF?: NO

## 2024-07-29 ASSESSMENT — LIFESTYLE VARIABLES
EVER FELT BAD OR GUILTY ABOUT YOUR DRINKING: NO
HAVE YOU EVER FELT YOU SHOULD CUT DOWN ON YOUR DRINKING: NO
EVER HAD A DRINK FIRST THING IN THE MORNING TO STEADY YOUR NERVES TO GET RID OF A HANGOVER: NO
HAVE PEOPLE ANNOYED YOU BY CRITICIZING YOUR DRINKING: NO
TOTAL SCORE: 0

## 2024-07-29 NOTE — ED TRIAGE NOTES
Pt arrives via [facility] for phantom leg pain. Pt is a bilateral amputee and has been experiencing more severe phantom leg pain since yesterday. Pt has no other complaints at this time

## 2024-07-29 NOTE — ED PROVIDER NOTES
"HPI   Chief Complaint   Patient presents with    phantom leg pain        HPI  Link Macias is a 63-year-old male with history of PAD s/p bilateral AKA 2006, type 2 diabetes, hyperlipidemia, hypertension presenting with left leg phantom pain.  Patient states the pain is constant and sharp and began yesterday.  Patient states he was not doing anything specifically when the pain began.  Patient states he has had this pain previously in which he went to the emergency department for.  Patient states he normally gets \"a shot in the arm\" as well as pills for pain relief.  Patient states these medications do relieve the pain.  Patient denies recent injuries to the stumps.  Patient denies chest pain, shortness of breath, abdominal pain, nausea, vomiting, fevers.      Patient History   No past medical history on file.  Past Surgical History:   Procedure Laterality Date    CT ABDOMEN PELVIS ANGIOGRAM W AND/OR WO IV CONTRAST  12/16/2022    CT ABDOMEN PELVIS ANGIOGRAM W AND/OR WO IV CONTRAST 12/16/2022 [facility]    CT ABDOMEN PELVIS ANGIOGRAM W AND/OR WO IV CONTRAST  8/18/2023    CT ABDOMEN PELVIS ANGIOGRAM W AND/OR WO IV CONTRAST 8/18/2023 [facility] [place] [place]    CT ABDOMEN PELVIS ANGIOGRAM W AND/OR WO IV CONTRAST  9/3/2023    CT ABDOMEN PELVIS ANGIOGRAM W AND/OR WO IV CONTRAST 9/3/2023 [facility] [place]     No family history on file.  Social History     Tobacco Use    Smoking status: Every Day     Current packs/day: 0.50     Types: Cigarettes    Smokeless tobacco: Never   Substance Use Topics    Alcohol use: Not on file    Drug use: Not on file       Physical Exam   ED Triage Vitals [07/29/24 1507]   Temperature Heart Rate Respirations BP   36.3 °C (97.4 °F) 62 18 159/73      Pulse Ox Temp Source Heart Rate Source Patient Position   94 % Tympanic Monitor Lying      BP Location FiO2 (%)     Right arm --       Physical Exam  Vitals and nursing note reviewed.   Constitutional:       Appearance: Normal appearance.   Cardiovascular:      Rate " "and Rhythm: Normal rate and regular rhythm.      Heart sounds: Normal heart sounds. No murmur heard.     No gallop.   Pulmonary:      Effort: Pulmonary effort is normal. No respiratory distress.      Breath sounds: Normal breath sounds. No stridor. No wheezing, rhonchi or rales.   Abdominal:      General: Abdomen is flat. Bowel sounds are normal. There is no distension.      Palpations: Abdomen is soft. There is no mass.      Tenderness: There is no abdominal tenderness. There is no guarding or rebound.      Hernia: No hernia is present.   Musculoskeletal:      Comments: Bilateral stumps are nontender without erythema and fluctuance      Right Lower Extremity: Right leg is amputated above knee.      Left Lower Extremity: Left leg is amputated above knee.   Skin:     General: Skin is warm and dry.   Neurological:      General: No focal deficit present.      Mental Status: He is alert and oriented to person, place, and time.   Psychiatric:         Mood and Affect: Mood normal.         Behavior: Behavior normal.           ED Course & MDM   Diagnoses as of 07/30/24 1407   Phantom limb pain (Multi)                       Chitra Coma Scale Score: 15                        Medical Decision Making  This is a 63-year-old male with history of PAD s/p bilateral AKA 2006, diabetes, hyperlipidemia, hypertension presenting with left leg phantom pain.  Patient states the pain began yesterday and is a sharp constant pain.  Patient states he has had similar pain in the past and when she is received \"a shot in the arm\" and pills in the ED for pain relief.  Bilateral stumps are nontender without fluctuance and erythema.  Do not suspect infection or injury at this time.  Per chart review the patient has been given Toradol as well as Tylenol and Robaxin for pain relief.  Patient was initially given Toradol.  Patient was reevaluated after receiving the Toradol and stated he still had pain therefore Tylenol and Robaxin were given.  " Patient states the pain was relieved with the medications however he still had a little pain.  The patient was then given 100 mg of gabapentin to fully relieve the phantom leg pain.  Patient states after receiving the gabapentin with the pain was fully relieved.  Discussed with patient that he will benefit from following up with a pain medicine provider to prevent the phantom leg pain since he has had multiple episodes of similar pain in the past.  Discussed with patient that he will be given a prescription of gabapentin to take at home for the phantom leg pain until he can be seen by either a pain medicine provider or the primary care provider.  Patient has remained hemodynamically stable while in the ED today.    Disposition: Discharge home  Advised patient to follow-up with pain medicine provider for further evaluation and management of his phantom leg pain.  Referral and contact information has been included in patient's discharge paperwork.  Patient was also advised to follow-up with his primary care provider.  Patient was given prescription for 14 days of  100 mg gabapentin 3 times daily.  Strict return precautions were given.  Plan was discussed with the patient and the patient understands and agrees.    Patient was discussed and staffed with Dr. Zee    Procedure  Procedures        ATTENDING ATTESTATION  Gentleman with a past medical history of peripheral vascular disease status post remote bilateral high AKA in 2006 presenting with recurrence of chronic intermittent phantom leg pain.  Patient is hemodynamically stable the stumps appear in good repair there is no wound dehiscence no tenderness no fluctuance no evidence of local cellulitis or infection.  Patient has stable hemodynamics and vitals he has no complaints otherwise he states this feels identical to his past phantom leg pain.  The stumps are warm and well-perfused bilaterally with cap refill less than 2 seconds.  We will treat the patient with  medications that worked in the past including Toradol, Robaxin.  Patient is not currently aligned with pain management may benefit we will provide him with the number for pain management to align formally.  I do anticipate a safe discharge home    Chau Zee DO  [facility]  [facility]    The patient was seen by the resident/fellow.  I have personally performed a substantive portion of the encounter.  I have seen and examined the patient; agree with the workup, evaluation, MDM, management and diagnosis.    I have reviewed all the nurses' notes and have confirmed their findings, and have incorporated those findings into this medical record.   The care plan has been discussed with the resident/fellow; I have reviewed the resident/fellow’s note and agree with the documented findings with the exception/addition of information listed above.  On my own examination I agree and incorporated in this document my own history, examination findings and clinical decision making.  All notation in this Addendum supersedes information presented by the resident or ALBERTO as listed above. ,catarino Jane PA-C  07/30/24 0562

## 2024-07-30 NOTE — DISCHARGE INSTRUCTIONS
Please follow-up with a pain management provider for further evaluation and management of your pain.  Follow-up with your primary care provider if you are unable to see a pain management provider in the next few weeks.  Return to the emergency department if your symptoms persist or worsen.

## 2024-07-31 ENCOUNTER — HOSPITAL ENCOUNTER (EMERGENCY)
Facility: HOSPITAL | Age: 64
Discharge: HOME | End: 2024-07-31
Payer: MEDICAID

## 2024-07-31 ENCOUNTER — APPOINTMENT (OUTPATIENT)
Dept: RADIOLOGY | Facility: HOSPITAL | Age: 64
End: 2024-07-31
Payer: MEDICAID

## 2024-07-31 VITALS
RESPIRATION RATE: 16 BRPM | BODY MASS INDEX: 23.54 KG/M2 | HEART RATE: 57 BPM | WEIGHT: 150 LBS | SYSTOLIC BLOOD PRESSURE: 189 MMHG | OXYGEN SATURATION: 95 % | DIASTOLIC BLOOD PRESSURE: 75 MMHG | TEMPERATURE: 97.2 F | HEIGHT: 67 IN

## 2024-07-31 DIAGNOSIS — S43.402A SPRAIN OF LEFT SHOULDER, UNSPECIFIED SHOULDER SPRAIN TYPE, INITIAL ENCOUNTER: Primary | ICD-10-CM

## 2024-07-31 PROCEDURE — 73060 X-RAY EXAM OF HUMERUS: CPT | Mod: LT

## 2024-07-31 PROCEDURE — 73060 X-RAY EXAM OF HUMERUS: CPT | Mod: LEFT SIDE | Performed by: STUDENT IN AN ORGANIZED HEALTH CARE EDUCATION/TRAINING PROGRAM

## 2024-07-31 PROCEDURE — 73030 X-RAY EXAM OF SHOULDER: CPT | Mod: LEFT SIDE | Performed by: STUDENT IN AN ORGANIZED HEALTH CARE EDUCATION/TRAINING PROGRAM

## 2024-07-31 PROCEDURE — 99284 EMERGENCY DEPT VISIT MOD MDM: CPT | Performed by: NURSE PRACTITIONER

## 2024-07-31 PROCEDURE — 73030 X-RAY EXAM OF SHOULDER: CPT | Mod: LT

## 2024-07-31 RX ORDER — ACETAMINOPHEN 325 MG/1
650 TABLET ORAL EVERY 6 HOURS PRN
Qty: 30 TABLET | Refills: 0 | Status: SHIPPED | OUTPATIENT
Start: 2024-07-31

## 2024-07-31 RX ORDER — ACETAMINOPHEN 325 MG/1
975 TABLET ORAL ONCE
Status: COMPLETED | OUTPATIENT
Start: 2024-07-31 | End: 2024-07-31

## 2024-07-31 ASSESSMENT — PAIN DESCRIPTION - PAIN TYPE: TYPE: ACUTE PAIN

## 2024-07-31 ASSESSMENT — PAIN SCALES - WONG BAKER: WONGBAKER_NUMERICALRESPONSE: HURTS WHOLE LOT

## 2024-07-31 ASSESSMENT — PAIN DESCRIPTION - LOCATION: LOCATION: SHOULDER

## 2024-07-31 ASSESSMENT — LIFESTYLE VARIABLES
HAVE YOU EVER FELT YOU SHOULD CUT DOWN ON YOUR DRINKING: NO
EVER HAD A DRINK FIRST THING IN THE MORNING TO STEADY YOUR NERVES TO GET RID OF A HANGOVER: NO
EVER FELT BAD OR GUILTY ABOUT YOUR DRINKING: NO
HAVE PEOPLE ANNOYED YOU BY CRITICIZING YOUR DRINKING: NO
TOTAL SCORE: 0

## 2024-07-31 ASSESSMENT — PAIN SCALES - GENERAL
PAINLEVEL_OUTOF10: 9
PAINLEVEL_OUTOF10: 10 - WORST POSSIBLE PAIN
PAINLEVEL_OUTOF10: 10 - WORST POSSIBLE PAIN

## 2024-07-31 ASSESSMENT — PAIN DESCRIPTION - ORIENTATION
ORIENTATION: LEFT
ORIENTATION: LEFT

## 2024-07-31 ASSESSMENT — PAIN - FUNCTIONAL ASSESSMENT: PAIN_FUNCTIONAL_ASSESSMENT: 0-10

## 2024-07-31 NOTE — ED PROVIDER NOTES
Emergency Department Encounter  [facility] EMERGENCY MEDICINE    Patient: Link Macias  MRN: [id number]  : 1960  Date of Evaluation: 2024  ED Provider: BALDEV Cuenca      Chief Complaint       Chief Complaint   Patient presents with    Shoulder Pain        Limitations to History: none  Historian: patient  Records reviewed: EMR inpatient and outpatient notes, Care Everywhere    This is a 63-year-old male with a PMH of bilateral AKA, diabetes, hypertension, hyperlipidemia who presents to the emergency room with left shoulder pain.  Patient states that he rolled off his bed and landed on the ground around 4:00 to 5:00 PM today.  Denies any head injury or loss of consciousness.  Patient states that he does not take any blood thinners.  Patient states that he has left upper arm pain and left shoulder pain which she describes as a sharp, constant pain rating it a 10 out of 10.  Denies any numbness or tingling. Denies taking any pain medications prior to arrival.    PMH: DM, HLD, HTN, Bilateral AKA  PSH: Bilateral aorta-femoral bypass  Allergies: PCN  Social HX: + smoker, denies alcohol or drug use.  Family HX: No family history pertinent to current presenting problem  Medications: Reviewed per EMR    ROS:     Review of Systems   Musculoskeletal:         + left shoulder pain     14 systems reviewed and otherwise acutely negative except as in the [place].        Past History   No past medical history on file.  Past Surgical History:   Procedure Laterality Date    CT ABDOMEN PELVIS ANGIOGRAM W AND/OR WO IV CONTRAST  2022    CT ABDOMEN PELVIS ANGIOGRAM W AND/OR WO IV CONTRAST 2022 [facility]    CT ABDOMEN PELVIS ANGIOGRAM W AND/OR WO IV CONTRAST  2023    CT ABDOMEN PELVIS ANGIOGRAM W AND/OR WO IV CONTRAST 2023 [facility] [place]    CT ABDOMEN PELVIS ANGIOGRAM W AND/OR WO IV CONTRAST  9/3/2023    CT ABDOMEN PELVIS ANGIOGRAM W AND/OR WO IV CONTRAST [place]3/20[place] [facility] [place]          Medications/Allergies     Previous Medications    ACETAMINOPHEN (TYLENOL) 325 MG TABLET    Take 2 tablets (650 mg) by mouth every 6 hours if needed.    ASPIRIN 81 MG EC TABLET    Take 1 tablet (81 mg) by mouth once daily.    ATORVASTATIN (LIPITOR) 80 MG TABLET    Take 1 tablet (80 mg) by mouth once daily at bedtime.    BENZETHONIUM CHLORIDE 0.13 % FOAM    Apply topically twice a day.    CHOLECALCIFEROL (VITAMIN D-3) 25 MCG (1000 UT) CAPSULE    Take 1 capsule (25 mcg) by mouth once daily.    DOCUSATE SODIUM (COLACE) 100 MG CAPSULE    Take 1 capsule (100 mg) by mouth 2 times a day as needed for constipation.    DULOXETINE (CYMBALTA) 60 MG DR CAPSULE    Take 1 capsule (60 mg) by mouth once daily.    EMPAGLIFLOZIN (JARDIANCE) 25 MG    Take 1 tablet (25 mg) by mouth once daily with breakfast.    FAMOTIDINE (PEPCID) 20 MG TABLET    Take 1 tablet (20 mg) by mouth once daily.    GABAPENTIN (NEURONTIN) 100 MG CAPSULE    Take 1 capsule (100 mg) by mouth 3 times a day for 14 days.    GENTAMICIN (GARAMYCIN) 0.1 % OINTMENT    Apply topically 3 times a day.    HYDROCORTISONE (ANUSOL-HC) 2.5 % RECTAL CREAM        HYDROCORTISONE 2.5 % CREAM    APPLY BY RECTAL ROUTE TWICE A DAY AS NEEDED    HYDROXYZINE HCL (ATARAX) 25 MG TABLET    Take 1 tablet (25 mg) by mouth once daily as needed for itching or anxiety.    IBUPROFEN 400 MG TABLET    Take 1 tablet (400 mg) by mouth 4 times a day.    LOSARTAN (COZAAR) 100 MG TABLET    Take 1 tablet (100 mg) by mouth once daily.    MELATONIN 3 MG TABLET    Take 1 tablet (3 mg) by mouth once daily at bedtime.    METFORMIN (GLUCOPHAGE) 500 MG TABLET    Take 1 tablet (500 mg) by mouth 2 times a day with meals.    METHOCARBAMOL (ROBAXIN) 500 MG TABLET    Take 1 tablet (500 mg) by mouth 2 times a day as needed.    METOPROLOL TARTRATE (LOPRESSOR) 25 MG TABLET    Take 1 tablet (25 mg) by mouth 2 times a day.    NAPROXEN (NAPROSYN) 375 MG TABLET    Take 1 tablet (375 mg) by mouth every 12 hours if  needed.    NITROGLYCERIN (NITROSTAT) 0.4 MG SL TABLET    Place 1 tablet (0.4 mg) under the tongue every 5 minutes if needed for chest pain. UP TO 3 TIMES. IF NO RELIEF CALL 911.    OMEGA-3 ACID ETHYL ESTERS (LOVAZA) 1 GRAM CAPSULE    Take 2 capsules (2 g) by mouth once daily.    OXCARBAZEPINE (TRILEPTAL) 150 MG TABLET    Take 1 tablet (150 mg) by mouth 2 times a day.    PANTOPRAZOLE (PROTONIX) 40 MG EC TABLET    TAKE 1 TABLET BY MOUTH TWO TIMES A DAY    PANTOPRAZOLE (PROTONIX) 40 MG EC TABLET    TAKE 1 TABLET BY MOUTH ONCE DAILY    PREGABALIN (LYRICA) 150 MG CAPSULE        PREGABALIN (LYRICA) 75 MG CAPSULE    Take 1 capsule (75 mg) by mouth 2 times a day.     Allergies   Allergen Reactions    Penicillins Unknown        Physical Exam       ED Triage Vitals [07/31/24 1634]   Temperature Heart Rate Respirations BP   36.2 °C (97.2 °F) 57 16 (!) 189/75      Pulse Ox Temp Source Heart Rate Source Patient Position   95 % Temporal Monitor Sitting      BP Location FiO2 (%)     Right arm --       Physical Exam:    Appearance: Alert, oriented , cooperative,  in no acute distress. Well nourished & well hydrated.    Skin: Intact,  dry skin, no lesions, rash, petechiae or purpura.     Eyes: PERRLA, EOMs intact.    ENT: Hearing grossly intact. External auditory canals patent, tympanic membranes intact with visible landmarks. Nares patent, mucus membranes moist. Dentition without lesions. Pharynx clear, uvula midline.     Neck: Supple, without meningismus. No c-spine tenderness.    Pulmonary: Clear bilaterally with good chest wall excursion. No rales, rhonchi or wheezing. No accessory muscle use or stridor.    Cardiac: Normal S1, S2. No JVD, Carotids without bruits.    Abdomen: Soft, nontender, active bowel sounds.  No palpable organomegaly.  No rebound or guarding.  No CVA tenderness.    Musculoskeletal: Tenderness to the left shoulder.  Pulses full and equal. No cyanosis, clubbing, or edema. Bilateral AKA.    Neurological:   "Normal sensation, no weakness, no focal findings identified.    Psychiatric: Appropriate mood and affect.       Diagnostics   Labs:  No results found for this or any previous visit (from the past 24 hour(s)).   Radiographs:  XR shoulder left 2+ views   Final Result   No acute fracture or dislocation.             MACRO:   None        Signed by: Catarino Telles 7/31/2024 6:30 PM   Dictation workstation:   [id number]      XR humerus left   Final Result   No acute fracture or dislocation.             MACRO:   None        Signed by: Catarino Telles 7/31/2024 6:30 PM   Dictation workstation:   [id number]              Assessment   In brief, Link Macias is a 63 y.o. male who presented to the emergency department with left shoulder pain after a fall.          ED Course/MDM     Diagnoses as of 07/31/24 1840   Sprain of left shoulder, unspecified shoulder sprain type, initial encounter      Visit Vitals  BP (!) 189/75 (BP Location: Right arm, Patient Position: Sitting)   Pulse 57   Temp 36.2 °C (97.2 °F) (Temporal)   Resp 16   Ht 1.702 m (5' 7\")   Wt 68 kg (150 lb)   SpO2 95%   BMI 23.49 kg/m²   Smoking Status Every Day   BSA 1.79 m²       Medications   acetaminophen (Tylenol) tablet 975 mg (975 mg oral Given 7/31/24 1756)       Patient remained stable while in the emergency department. Previous outpatient and ED records were reviewed. Outside records were reviewed. Differentials include fracture, sprain, strain.  X-rays of the left shoulder and humerus were obtained.  No acute fracture or dislocation.  Patient received Tylenol for pain.  Patient had full range of motion of his upper extremity.  Patient was discharged home, advised to follow-up with orthopedics and return the emergency room with worsening symptoms.    Final Impression      1. Sprain of left shoulder, unspecified shoulder sprain type, initial encounter          DISPOSITION  Disposition: Discharged home    Comment: Please note this report has been produced " using speech recognition software and may contain errors related to that system including errors in grammar, punctuation, and spelling, as well as words and phrases that may be inappropriate.  If there are any questions or concerns please feel free to contact the dictating provider for clarification.    CHRISTEN Cuenca-CHRISTEN Stern-QI  07/31/24 8160

## 2024-08-04 ENCOUNTER — HOSPITAL ENCOUNTER (EMERGENCY)
Facility: HOSPITAL | Age: 64
Discharge: HOME | End: 2024-08-04
Attending: EMERGENCY MEDICINE
Payer: MEDICAID

## 2024-08-04 ENCOUNTER — APPOINTMENT (OUTPATIENT)
Dept: RADIOLOGY | Facility: HOSPITAL | Age: 64
End: 2024-08-04
Payer: MEDICAID

## 2024-08-04 ENCOUNTER — CLINICAL SUPPORT (OUTPATIENT)
Dept: EMERGENCY MEDICINE | Facility: HOSPITAL | Age: 64
End: 2024-08-04
Payer: MEDICAID

## 2024-08-04 VITALS
DIASTOLIC BLOOD PRESSURE: 70 MMHG | RESPIRATION RATE: 16 BRPM | HEART RATE: 70 BPM | WEIGHT: 149.91 LBS | HEIGHT: 67 IN | TEMPERATURE: 97.9 F | BODY MASS INDEX: 23.53 KG/M2 | SYSTOLIC BLOOD PRESSURE: 160 MMHG | OXYGEN SATURATION: 97 %

## 2024-08-04 DIAGNOSIS — R11.0 NAUSEA: Primary | ICD-10-CM

## 2024-08-04 LAB
ALBUMIN SERPL BCP-MCNC: 4.7 G/DL (ref 3.4–5)
ALP SERPL-CCNC: 106 U/L (ref 33–136)
ALT SERPL W P-5'-P-CCNC: 9 U/L (ref 10–52)
ANION GAP SERPL CALC-SCNC: 18 MMOL/L (ref 10–20)
APPEARANCE UR: CLEAR
AST SERPL W P-5'-P-CCNC: 20 U/L (ref 9–39)
BASOPHILS # BLD AUTO: 0.03 X10*3/UL (ref 0–0.1)
BASOPHILS NFR BLD AUTO: 0.5 %
BILIRUB SERPL-MCNC: 0.7 MG/DL (ref 0–1.2)
BILIRUB UR STRIP.AUTO-MCNC: NEGATIVE MG/DL
BUN SERPL-MCNC: 20 MG/DL (ref 6–23)
CALCIUM SERPL-MCNC: 10 MG/DL (ref 8.6–10.6)
CARDIAC TROPONIN I PNL SERPL HS: 18 NG/L (ref 0–53)
CHLORIDE SERPL-SCNC: 100 MMOL/L (ref 98–107)
CO2 SERPL-SCNC: 21 MMOL/L (ref 21–32)
COLOR UR: ABNORMAL
CREAT SERPL-MCNC: 0.88 MG/DL (ref 0.5–1.3)
EGFRCR SERPLBLD CKD-EPI 2021: >90 ML/MIN/1.73M*2
EOSINOPHIL # BLD AUTO: 0.02 X10*3/UL (ref 0–0.7)
EOSINOPHIL NFR BLD AUTO: 0.3 %
ERYTHROCYTE [DISTWIDTH] IN BLOOD BY AUTOMATED COUNT: 15.2 % (ref 11.5–14.5)
FLUAV RNA RESP QL NAA+PROBE: NOT DETECTED
FLUBV RNA RESP QL NAA+PROBE: NOT DETECTED
GLUCOSE BLD MANUAL STRIP-MCNC: 84 MG/DL (ref 74–99)
GLUCOSE SERPL-MCNC: 187 MG/DL (ref 74–99)
GLUCOSE UR STRIP.AUTO-MCNC: ABNORMAL MG/DL
HCT VFR BLD AUTO: 54 % (ref 41–52)
HGB BLD-MCNC: 16.7 G/DL (ref 13.5–17.5)
IMM GRANULOCYTES # BLD AUTO: 0.01 X10*3/UL (ref 0–0.7)
IMM GRANULOCYTES NFR BLD AUTO: 0.2 % (ref 0–0.9)
KETONES UR STRIP.AUTO-MCNC: ABNORMAL MG/DL
LEUKOCYTE ESTERASE UR QL STRIP.AUTO: NEGATIVE
LIPASE SERPL-CCNC: 61 U/L (ref 9–82)
LYMPHOCYTES # BLD AUTO: 1.31 X10*3/UL (ref 1.2–4.8)
LYMPHOCYTES NFR BLD AUTO: 21.9 %
MAGNESIUM SERPL-MCNC: 2.12 MG/DL (ref 1.6–2.4)
MCH RBC QN AUTO: 24.7 PG (ref 26–34)
MCHC RBC AUTO-ENTMCNC: 30.9 G/DL (ref 32–36)
MCV RBC AUTO: 80 FL (ref 80–100)
MONOCYTES # BLD AUTO: 0.38 X10*3/UL (ref 0.1–1)
MONOCYTES NFR BLD AUTO: 6.4 %
NEUTROPHILS # BLD AUTO: 4.22 X10*3/UL (ref 1.2–7.7)
NEUTROPHILS NFR BLD AUTO: 70.7 %
NITRITE UR QL STRIP.AUTO: NEGATIVE
NRBC BLD-RTO: 0 /100 WBCS (ref 0–0)
PH UR STRIP.AUTO: 5.5 [PH]
PLATELET # BLD AUTO: 231 X10*3/UL (ref 150–450)
POTASSIUM SERPL-SCNC: 4.2 MMOL/L (ref 3.5–5.3)
PROT SERPL-MCNC: 7.5 G/DL (ref 6.4–8.2)
PROT UR STRIP.AUTO-MCNC: ABNORMAL MG/DL
RBC # BLD AUTO: 6.77 X10*6/UL (ref 4.5–5.9)
RBC # UR STRIP.AUTO: ABNORMAL /UL
RBC #/AREA URNS AUTO: NORMAL /HPF
SARS-COV-2 RNA RESP QL NAA+PROBE: NOT DETECTED
SODIUM SERPL-SCNC: 135 MMOL/L (ref 136–145)
SP GR UR STRIP.AUTO: 1.03
UROBILINOGEN UR STRIP.AUTO-MCNC: NORMAL MG/DL
WBC # BLD AUTO: 6 X10*3/UL (ref 4.4–11.3)
WBC #/AREA URNS AUTO: NORMAL /HPF

## 2024-08-04 PROCEDURE — 71046 X-RAY EXAM CHEST 2 VIEWS: CPT

## 2024-08-04 PROCEDURE — 71046 X-RAY EXAM CHEST 2 VIEWS: CPT | Mod: FOREIGN READ | Performed by: RADIOLOGY

## 2024-08-04 PROCEDURE — 2500000005 HC RX 250 GENERAL PHARMACY W/O HCPCS: Mod: SE

## 2024-08-04 PROCEDURE — 87636 SARSCOV2 & INF A&B AMP PRB: CPT

## 2024-08-04 PROCEDURE — 2500000004 HC RX 250 GENERAL PHARMACY W/ HCPCS (ALT 636 FOR OP/ED): Mod: SE

## 2024-08-04 PROCEDURE — 36415 COLL VENOUS BLD VENIPUNCTURE: CPT

## 2024-08-04 PROCEDURE — 80053 COMPREHEN METABOLIC PANEL: CPT

## 2024-08-04 PROCEDURE — 84484 ASSAY OF TROPONIN QUANT: CPT

## 2024-08-04 PROCEDURE — 96374 THER/PROPH/DIAG INJ IV PUSH: CPT

## 2024-08-04 PROCEDURE — 99284 EMERGENCY DEPT VISIT MOD MDM: CPT

## 2024-08-04 PROCEDURE — 83735 ASSAY OF MAGNESIUM: CPT

## 2024-08-04 PROCEDURE — 82947 ASSAY GLUCOSE BLOOD QUANT: CPT

## 2024-08-04 PROCEDURE — 93005 ELECTROCARDIOGRAM TRACING: CPT

## 2024-08-04 PROCEDURE — 85025 COMPLETE CBC W/AUTO DIFF WBC: CPT

## 2024-08-04 PROCEDURE — 83690 ASSAY OF LIPASE: CPT

## 2024-08-04 PROCEDURE — 81001 URINALYSIS AUTO W/SCOPE: CPT

## 2024-08-04 PROCEDURE — 96376 TX/PRO/DX INJ SAME DRUG ADON: CPT

## 2024-08-04 RX ORDER — ONDANSETRON 4 MG/1
4 TABLET, ORALLY DISINTEGRATING ORAL ONCE
Status: COMPLETED | OUTPATIENT
Start: 2024-08-04 | End: 2024-08-04

## 2024-08-04 RX ORDER — ONDANSETRON HYDROCHLORIDE 2 MG/ML
4 INJECTION, SOLUTION INTRAVENOUS ONCE
Status: COMPLETED | OUTPATIENT
Start: 2024-08-04 | End: 2024-08-04

## 2024-08-04 RX ORDER — ONDANSETRON 4 MG/1
4 TABLET, FILM COATED ORAL EVERY 8 HOURS PRN
Qty: 12 TABLET | Refills: 0 | Status: SHIPPED | OUTPATIENT
Start: 2024-08-04 | End: 2024-08-08

## 2024-08-04 ASSESSMENT — PAIN SCALES - GENERAL: PAINLEVEL_OUTOF10: 0 - NO PAIN

## 2024-08-04 ASSESSMENT — COLUMBIA-SUICIDE SEVERITY RATING SCALE - C-SSRS
2. HAVE YOU ACTUALLY HAD ANY THOUGHTS OF KILLING YOURSELF?: NO
6. HAVE YOU EVER DONE ANYTHING, STARTED TO DO ANYTHING, OR PREPARED TO DO ANYTHING TO END YOUR LIFE?: NO
1. IN THE PAST MONTH, HAVE YOU WISHED YOU WERE DEAD OR WISHED YOU COULD GO TO SLEEP AND NOT WAKE UP?: NO

## 2024-08-04 ASSESSMENT — PAIN - FUNCTIONAL ASSESSMENT: PAIN_FUNCTIONAL_ASSESSMENT: 0-10

## 2024-08-04 ASSESSMENT — PAIN DESCRIPTION - PROGRESSION: CLINICAL_PROGRESSION: NOT CHANGED

## 2024-08-04 NOTE — ED PROVIDER NOTES
Emergency Department Provider Note        History of Present Illness     History provided by: Patient  Limitations to History: None  External Records Reviewed with Brief Summary:  ED provider note from 2024 showing left shoulder pain after fall with negative x-rays.    HPI:  Link Macias is a 63 y.o. male with bilateral AKA, DM, HTN, HLD presenting today for malaise that began this morning.  Patient endorses weakness, nausea, fevers and chills.  He also endorses abdominal pain.  Patient denies any vomiting.  Patient denies any bowel or urinary changes.  Patient denies chest pain or shortness of breath.    Physical Exam   Triage vitals:  T 36.6 °C (97.9 °F)  HR 91  /72  RR 16  O2 96 % None (Room air)    General: Awake, alert, in no acute distress  Eyes: Gaze conjugate.  No scleral icterus or injection  HENT: Normo-cephalic, atraumatic. No stridor  CV: Regular rate, regular rhythm.   Resp: Breathing non-labored, speaking in full sentences.  Clear to auscultation bilaterally  GI: Soft, non-distended, tender to palpation in the epigastric region. No rebound or guarding.  MSK/Extremities: No gross bony deformities. Moving upper extremities. B/L AKA.   Skin: Warm. Appropriate color  Neuro: Alert. Oriented. Face symmetric. Speech is fluent.    Psych: Appropriate mood and affect    Medical Decision Making & ED Course   Medical Decision Makin y.o. male with bilateral AKA, DM, HTN, HLD presenting today for malaise that began this morning.  Upon initial evaluation he is well-appearing with reassuring vital signs.  Physical exam notable for epigastric tenderness.  Differential diagnoses includes viral infection, bacterial infection, hunger, electrolyte abnormality.   Patient was given Zofran and food for nausea.  CBC were ordered indicating no signs of infection or anemia.  Troponin was negative.  Chest x-ray showed no acute cardiopulmonary process.  Influenza and COVID were negative.  UA showed no signs  of infection.  Lipase was negative, CMP showed no overt electrolyte abnormalities.  On reevaluation patient still had some complaints of nausea.  He was given additional Zofran and more food.  Upon reevaluation patient states he does feel little better but still requesting food.  Patient was given additional food.   Patient is improved clinically throughout his ED course.  Patient is amenable to discharge with a prescription of Zofran for some continued abdominal discomfort.     ----    Differential diagnoses considered include but are not limited to: Viral infection, bacterial infection, hunger, electrolyte abnormality     Social Determinants of Health which Significantly Impact Care: Pt states he has at home care 5 days a week but believes this is not enough help.     EKG Independent Interpretation: EKG not obtained    Independent Result Review and Interpretation: Chest X-Ray as interpreted by me revealed no acute cardiopulmonary process.     Chronic conditions affecting the patient's care: As documented above in [facility]    The patient was discussed with the following consultants/services: None    Care Considerations: As documented above in [facility]    ED Course:  Diagnoses as of 08/04/24 1703   Nausea     Disposition   As a result of the work-up, the patient was discharged home.  he was informed of his diagnosis and instructed to come back with any concerns or worsening of condition.  he and was agreeable to the plan as discussed above.  he was given the opportunity to ask questions.  All of the patient's questions were answered.    Procedures     Patient seen and discussed with ED attending physician.    Jordan Henriquez DO  Emergency Medicine       Jordan Henriquez DO  Resident  08/04/24 1918

## 2024-08-05 LAB — HOLD SPECIMEN: NORMAL

## 2024-08-06 LAB
ATRIAL RATE: 86 BPM
P AXIS: 42 DEGREES
P OFFSET: 170 MS
P ONSET: 129 MS
PR INTERVAL: 194 MS
Q ONSET: 226 MS
QRS COUNT: 14 BEATS
QRS DURATION: 66 MS
QT INTERVAL: 336 MS
QTC CALCULATION(BAZETT): 402 MS
QTC FREDERICIA: 379 MS
R AXIS: 53 DEGREES
T AXIS: 215 DEGREES
T OFFSET: 394 MS
VENTRICULAR RATE: 86 BPM

## 2024-08-15 ENCOUNTER — APPOINTMENT (OUTPATIENT)
Dept: RADIOLOGY | Facility: HOSPITAL | Age: 64
End: 2024-08-15
Payer: MEDICAID

## 2024-08-15 ENCOUNTER — HOSPITAL ENCOUNTER (OUTPATIENT)
Facility: HOSPITAL | Age: 64
Setting detail: OBSERVATION
Discharge: HOME HEALTH | End: 2024-08-16
Attending: STUDENT IN AN ORGANIZED HEALTH CARE EDUCATION/TRAINING PROGRAM | Admitting: NURSE PRACTITIONER
Payer: MEDICAID

## 2024-08-15 DIAGNOSIS — R07.9 CHEST PAIN, UNSPECIFIED TYPE: Primary | ICD-10-CM

## 2024-08-15 DIAGNOSIS — R07.1 CHEST PAIN ON BREATHING: ICD-10-CM

## 2024-08-15 DIAGNOSIS — R07.89 OTHER CHEST PAIN: ICD-10-CM

## 2024-08-15 DIAGNOSIS — M25.512 ACUTE PAIN OF LEFT SHOULDER: ICD-10-CM

## 2024-08-15 PROBLEM — G89.29 CHRONIC MUSCULOSKELETAL PAIN: Status: RESOLVED | Noted: 2024-04-24 | Resolved: 2024-08-15

## 2024-08-15 PROBLEM — S37.009A INJURY OF KIDNEY: Status: RESOLVED | Noted: 2024-04-24 | Resolved: 2024-08-15

## 2024-08-15 PROBLEM — E86.0 DEHYDRATION: Status: RESOLVED | Noted: 2023-11-10 | Resolved: 2024-08-15

## 2024-08-15 PROBLEM — G54.6 PHANTOM PAIN AFTER AMPUTATION OF LOWER EXTREMITY (MULTI): Status: RESOLVED | Noted: 2024-04-24 | Resolved: 2024-08-15

## 2024-08-15 PROBLEM — K92.1 HEMATOCHEZIA: Status: RESOLVED | Noted: 2024-04-24 | Resolved: 2024-08-15

## 2024-08-15 PROBLEM — K59.00 CONSTIPATION: Status: RESOLVED | Noted: 2024-04-24 | Resolved: 2024-08-15

## 2024-08-15 PROBLEM — T81.89XA NON-HEALING SURGICAL WOUND OF RIGHT GROIN: Status: RESOLVED | Noted: 2020-01-28 | Resolved: 2024-08-15

## 2024-08-15 PROBLEM — S30.0XXA CONTUSION OF SACRAL REGION: Status: RESOLVED | Noted: 2024-04-24 | Resolved: 2024-08-15

## 2024-08-15 PROBLEM — L20.89 OTHER ATOPIC DERMATITIS: Status: RESOLVED | Noted: 2018-05-15 | Resolved: 2024-08-15

## 2024-08-15 PROBLEM — K52.9 COLITIS: Status: RESOLVED | Noted: 2024-04-24 | Resolved: 2024-08-15

## 2024-08-15 PROBLEM — M79.89 SYMPTOM OF LEG SWELLING: Status: RESOLVED | Noted: 2024-04-24 | Resolved: 2024-08-15

## 2024-08-15 PROBLEM — Z89.622: Status: RESOLVED | Noted: 2019-04-22 | Resolved: 2024-08-15

## 2024-08-15 PROBLEM — R53.1 WEAKNESS PRESENT: Status: RESOLVED | Noted: 2024-04-24 | Resolved: 2024-08-15

## 2024-08-15 PROBLEM — R61 GENERALIZED HYPERHIDROSIS: Status: RESOLVED | Noted: 2023-02-20 | Resolved: 2024-08-15

## 2024-08-15 PROBLEM — L03.90 CELLULITIS: Status: RESOLVED | Noted: 2023-07-19 | Resolved: 2024-08-15

## 2024-08-15 PROBLEM — E11.10 DIABETIC KETOACIDOSIS (MULTI): Status: RESOLVED | Noted: 2024-04-24 | Resolved: 2024-08-15

## 2024-08-15 PROBLEM — Z86.39 HISTORY OF DIABETES MELLITUS: Status: RESOLVED | Noted: 2024-04-24 | Resolved: 2024-08-15

## 2024-08-15 PROBLEM — Z89.619: Status: RESOLVED | Noted: 2024-04-24 | Resolved: 2024-08-15

## 2024-08-15 PROBLEM — M54.9 BACKACHE: Status: RESOLVED | Noted: 2024-04-24 | Resolved: 2024-08-15

## 2024-08-15 PROBLEM — U07.1 DISEASE DUE TO SEVERE ACUTE RESPIRATORY SYNDROME CORONAVIRUS 2 (SARS-COV-2): Status: RESOLVED | Noted: 2024-04-24 | Resolved: 2024-08-15
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
## 2024-08-15 PROBLEM — Z86.79 HISTORY OF CARDIOVASCULAR DISORDER: Status: RESOLVED | Noted: 2024-04-24 | Resolved: 2024-08-15

## 2024-08-15 PROBLEM — R42 LIGHTHEADEDNESS: Status: RESOLVED | Noted: 2023-04-24 | Resolved: 2024-08-15

## 2024-08-15 PROBLEM — S31.103A: Status: RESOLVED | Noted: 2018-12-17 | Resolved: 2024-08-15

## 2024-08-15 PROBLEM — M79.18 CHRONIC MUSCULOSKELETAL PAIN: Status: RESOLVED | Noted: 2024-04-24 | Resolved: 2024-08-15

## 2024-08-15 PROBLEM — M25.522 LEFT ELBOW PAIN: Status: RESOLVED | Noted: 2017-11-01 | Resolved: 2024-08-15

## 2024-08-15 LAB
ALBUMIN SERPL BCP-MCNC: 3.1 G/DL (ref 3.4–5)
ANION GAP SERPL CALC-SCNC: 10 MMOL/L (ref 10–20)
BASOPHILS # BLD AUTO: 0.03 X10*3/UL (ref 0–0.1)
BASOPHILS NFR BLD AUTO: 0.9 %
BNP SERPL-MCNC: 12 PG/ML (ref 0–99)
BUN SERPL-MCNC: 17 MG/DL (ref 6–23)
CALCIUM SERPL-MCNC: 6.7 MG/DL (ref 8.6–10.6)
CARDIAC TROPONIN I PNL SERPL HS: 4 NG/L (ref 0–53)
CARDIAC TROPONIN I PNL SERPL HS: 5 NG/L (ref 0–53)
CHLORIDE SERPL-SCNC: 113 MMOL/L (ref 98–107)
CO2 SERPL-SCNC: 20 MMOL/L (ref 21–32)
CREAT SERPL-MCNC: 0.63 MG/DL (ref 0.5–1.3)
EGFRCR SERPLBLD CKD-EPI 2021: >90 ML/MIN/1.73M*2
EOSINOPHIL # BLD AUTO: 0.06 X10*3/UL (ref 0–0.7)
EOSINOPHIL NFR BLD AUTO: 1.7 %
ERYTHROCYTE [DISTWIDTH] IN BLOOD BY AUTOMATED COUNT: 14.5 % (ref 11.5–14.5)
GLUCOSE BLD MANUAL STRIP-MCNC: 81 MG/DL (ref 74–99)
GLUCOSE SERPL-MCNC: 66 MG/DL (ref 74–99)
HCT VFR BLD AUTO: 46.7 % (ref 41–52)
HGB BLD-MCNC: 15.4 G/DL (ref 13.5–17.5)
IMM GRANULOCYTES # BLD AUTO: 0.01 X10*3/UL (ref 0–0.7)
IMM GRANULOCYTES NFR BLD AUTO: 0.3 % (ref 0–0.9)
LYMPHOCYTES # BLD AUTO: 1.34 X10*3/UL (ref 1.2–4.8)
LYMPHOCYTES NFR BLD AUTO: 38.3 %
MAGNESIUM SERPL-MCNC: 1.74 MG/DL (ref 1.6–2.4)
MCH RBC QN AUTO: 24.8 PG (ref 26–34)
MCHC RBC AUTO-ENTMCNC: 33 G/DL (ref 32–36)
MCV RBC AUTO: 75 FL (ref 80–100)
MONOCYTES # BLD AUTO: 0.45 X10*3/UL (ref 0.1–1)
MONOCYTES NFR BLD AUTO: 12.9 %
NEUTROPHILS # BLD AUTO: 1.61 X10*3/UL (ref 1.2–7.7)
NEUTROPHILS NFR BLD AUTO: 45.9 %
NRBC BLD-RTO: 0 /100 WBCS (ref 0–0)
PHOSPHATE SERPL-MCNC: 3 MG/DL (ref 2.5–4.9)
PLATELET # BLD AUTO: 261 X10*3/UL (ref 150–450)
POTASSIUM SERPL-SCNC: 3.1 MMOL/L (ref 3.5–5.3)
RBC # BLD AUTO: 6.21 X10*6/UL (ref 4.5–5.9)
SODIUM SERPL-SCNC: 140 MMOL/L (ref 136–145)
WBC # BLD AUTO: 3.5 X10*3/UL (ref 4.4–11.3)

## 2024-08-15 PROCEDURE — 80069 RENAL FUNCTION PANEL: CPT

## 2024-08-15 PROCEDURE — 96374 THER/PROPH/DIAG INJ IV PUSH: CPT

## 2024-08-15 PROCEDURE — 36415 COLL VENOUS BLD VENIPUNCTURE: CPT

## 2024-08-15 PROCEDURE — 96372 THER/PROPH/DIAG INJ SC/IM: CPT | Performed by: NURSE PRACTITIONER

## 2024-08-15 PROCEDURE — 2500000004 HC RX 250 GENERAL PHARMACY W/ HCPCS (ALT 636 FOR OP/ED): Mod: SE

## 2024-08-15 PROCEDURE — 83735 ASSAY OF MAGNESIUM: CPT

## 2024-08-15 PROCEDURE — 99285 EMERGENCY DEPT VISIT HI MDM: CPT | Performed by: STUDENT IN AN ORGANIZED HEALTH CARE EDUCATION/TRAINING PROGRAM

## 2024-08-15 PROCEDURE — 2500000004 HC RX 250 GENERAL PHARMACY W/ HCPCS (ALT 636 FOR OP/ED): Mod: SE | Performed by: NURSE PRACTITIONER

## 2024-08-15 PROCEDURE — G0378 HOSPITAL OBSERVATION PER HR: HCPCS

## 2024-08-15 PROCEDURE — 82947 ASSAY GLUCOSE BLOOD QUANT: CPT

## 2024-08-15 PROCEDURE — 84484 ASSAY OF TROPONIN QUANT: CPT

## 2024-08-15 PROCEDURE — 2500000002 HC RX 250 W HCPCS SELF ADMINISTERED DRUGS (ALT 637 FOR MEDICARE OP, ALT 636 FOR OP/ED): Mod: SE | Performed by: NURSE PRACTITIONER

## 2024-08-15 PROCEDURE — 96375 TX/PRO/DX INJ NEW DRUG ADDON: CPT

## 2024-08-15 PROCEDURE — 2500000002 HC RX 250 W HCPCS SELF ADMINISTERED DRUGS (ALT 637 FOR MEDICARE OP, ALT 636 FOR OP/ED): Mod: SE

## 2024-08-15 PROCEDURE — 93010 ELECTROCARDIOGRAM REPORT: CPT | Performed by: STUDENT IN AN ORGANIZED HEALTH CARE EDUCATION/TRAINING PROGRAM

## 2024-08-15 PROCEDURE — 2500000001 HC RX 250 WO HCPCS SELF ADMINISTERED DRUGS (ALT 637 FOR MEDICARE OP): Mod: SE | Performed by: NURSE PRACTITIONER

## 2024-08-15 PROCEDURE — 2500000005 HC RX 250 GENERAL PHARMACY W/O HCPCS: Mod: SE

## 2024-08-15 PROCEDURE — 85025 COMPLETE CBC W/AUTO DIFF WBC: CPT

## 2024-08-15 PROCEDURE — 82947 ASSAY GLUCOSE BLOOD QUANT: CPT | Mod: 59

## 2024-08-15 PROCEDURE — 83880 ASSAY OF NATRIURETIC PEPTIDE: CPT

## 2024-08-15 PROCEDURE — 71045 X-RAY EXAM CHEST 1 VIEW: CPT

## 2024-08-15 PROCEDURE — 99285 EMERGENCY DEPT VISIT HI MDM: CPT | Mod: 25

## 2024-08-15 PROCEDURE — 99223 1ST HOSP IP/OBS HIGH 75: CPT | Performed by: NURSE PRACTITIONER

## 2024-08-15 PROCEDURE — 71045 X-RAY EXAM CHEST 1 VIEW: CPT | Performed by: RADIOLOGY

## 2024-08-15 RX ORDER — METHOCARBAMOL 500 MG/1
500 TABLET, FILM COATED ORAL 2 TIMES DAILY PRN
Status: DISCONTINUED | OUTPATIENT
Start: 2024-08-15 | End: 2024-08-17 | Stop reason: HOSPADM

## 2024-08-15 RX ORDER — REGADENOSON 0.08 MG/ML
0.4 INJECTION, SOLUTION INTRAVENOUS
Status: COMPLETED | OUTPATIENT
Start: 2024-08-15 | End: 2024-08-16

## 2024-08-15 RX ORDER — METOPROLOL TARTRATE 50 MG/1
25 TABLET ORAL 2 TIMES DAILY
Status: DISCONTINUED | OUTPATIENT
Start: 2024-08-15 | End: 2024-08-17 | Stop reason: HOSPADM

## 2024-08-15 RX ORDER — LIDOCAINE 560 MG/1
1 PATCH PERCUTANEOUS; TOPICAL; TRANSDERMAL ONCE
Status: COMPLETED | OUTPATIENT
Start: 2024-08-15 | End: 2024-08-16

## 2024-08-15 RX ORDER — DULOXETIN HYDROCHLORIDE 30 MG/1
60 CAPSULE, DELAYED RELEASE ORAL DAILY
Status: DISCONTINUED | OUTPATIENT
Start: 2024-08-15 | End: 2024-08-17 | Stop reason: HOSPADM

## 2024-08-15 RX ORDER — INSULIN LISPRO 100 [IU]/ML
0-5 INJECTION, SOLUTION INTRAVENOUS; SUBCUTANEOUS
Status: DISCONTINUED | OUTPATIENT
Start: 2024-08-16 | End: 2024-08-17 | Stop reason: HOSPADM

## 2024-08-15 RX ORDER — NAPROXEN 375 MG/1
375 TABLET ORAL EVERY 12 HOURS PRN
COMMUNITY

## 2024-08-15 RX ORDER — POTASSIUM CHLORIDE 20 MEQ/1
40 TABLET, EXTENDED RELEASE ORAL ONCE
Status: COMPLETED | OUTPATIENT
Start: 2024-08-15 | End: 2024-08-15

## 2024-08-15 RX ORDER — ACETAMINOPHEN 325 MG/1
650 TABLET ORAL EVERY 6 HOURS PRN
COMMUNITY
End: 2024-08-21

## 2024-08-15 RX ORDER — NITROGLYCERIN 0.4 MG/1
0.4 TABLET SUBLINGUAL EVERY 5 MIN PRN
COMMUNITY

## 2024-08-15 RX ORDER — AMOXICILLIN 250 MG
2 CAPSULE ORAL 2 TIMES DAILY
Status: DISCONTINUED | OUTPATIENT
Start: 2024-08-15 | End: 2024-08-17 | Stop reason: HOSPADM

## 2024-08-15 RX ORDER — ACETAMINOPHEN 325 MG/1
975 TABLET ORAL EVERY 6 HOURS
Status: DISCONTINUED | OUTPATIENT
Start: 2024-08-15 | End: 2024-08-17 | Stop reason: HOSPADM

## 2024-08-15 RX ORDER — ONDANSETRON 4 MG/1
4 TABLET, FILM COATED ORAL EVERY 8 HOURS PRN
COMMUNITY

## 2024-08-15 RX ORDER — HYDROXYZINE HYDROCHLORIDE 25 MG/1
25 TABLET, FILM COATED ORAL DAILY PRN
Status: DISCONTINUED | OUTPATIENT
Start: 2024-08-15 | End: 2024-08-17 | Stop reason: HOSPADM

## 2024-08-15 RX ORDER — TALC
3 POWDER (GRAM) TOPICAL NIGHTLY
Status: DISCONTINUED | OUTPATIENT
Start: 2024-08-15 | End: 2024-08-17 | Stop reason: HOSPADM

## 2024-08-15 RX ORDER — KETOROLAC TROMETHAMINE 15 MG/ML
15 INJECTION, SOLUTION INTRAMUSCULAR; INTRAVENOUS ONCE
Status: COMPLETED | OUTPATIENT
Start: 2024-08-15 | End: 2024-08-15

## 2024-08-15 RX ORDER — OXCARBAZEPINE 150 MG/1
150 TABLET, FILM COATED ORAL 2 TIMES DAILY
Status: DISCONTINUED | OUTPATIENT
Start: 2024-08-15 | End: 2024-08-17 | Stop reason: HOSPADM

## 2024-08-15 RX ORDER — PREGABALIN 50 MG/1
150 CAPSULE ORAL DAILY
Status: DISCONTINUED | OUTPATIENT
Start: 2024-08-15 | End: 2024-08-17 | Stop reason: HOSPADM

## 2024-08-15 RX ORDER — AMINOPHYLLINE 25 MG/ML
125 INJECTION, SOLUTION INTRAVENOUS AS NEEDED
Status: DISCONTINUED | OUTPATIENT
Start: 2024-08-15 | End: 2024-08-17 | Stop reason: HOSPADM

## 2024-08-15 RX ORDER — FAMOTIDINE 40 MG/1
20 TABLET, FILM COATED ORAL DAILY
Status: DISCONTINUED | OUTPATIENT
Start: 2024-08-15 | End: 2024-08-17 | Stop reason: HOSPADM

## 2024-08-15 RX ORDER — LOSARTAN POTASSIUM 50 MG/1
100 TABLET ORAL DAILY
Status: DISCONTINUED | OUTPATIENT
Start: 2024-08-15 | End: 2024-08-17 | Stop reason: HOSPADM

## 2024-08-15 RX ORDER — METHOCARBAMOL 100 MG/ML
1000 INJECTION, SOLUTION INTRAMUSCULAR; INTRAVENOUS ONCE
Status: COMPLETED | OUTPATIENT
Start: 2024-08-15 | End: 2024-08-15

## 2024-08-15 RX ORDER — ATORVASTATIN CALCIUM 20 MG/1
80 TABLET, FILM COATED ORAL NIGHTLY
Status: DISCONTINUED | OUTPATIENT
Start: 2024-08-15 | End: 2024-08-17 | Stop reason: HOSPADM

## 2024-08-15 RX ORDER — DEXTROSE 50 % IN WATER (D50W) INTRAVENOUS SYRINGE
25
Status: DISCONTINUED | OUTPATIENT
Start: 2024-08-15 | End: 2024-08-17 | Stop reason: HOSPADM

## 2024-08-15 RX ORDER — DEXTROSE 50 % IN WATER (D50W) INTRAVENOUS SYRINGE
12.5
Status: DISCONTINUED | OUTPATIENT
Start: 2024-08-15 | End: 2024-08-17 | Stop reason: HOSPADM

## 2024-08-15 RX ORDER — ENOXAPARIN SODIUM 100 MG/ML
40 INJECTION SUBCUTANEOUS EVERY 24 HOURS
Status: DISCONTINUED | OUTPATIENT
Start: 2024-08-15 | End: 2024-08-17 | Stop reason: HOSPADM

## 2024-08-15 RX ORDER — ASPIRIN 81 MG/1
81 TABLET ORAL DAILY
Status: DISCONTINUED | OUTPATIENT
Start: 2024-08-15 | End: 2024-08-17 | Stop reason: HOSPADM

## 2024-08-15 RX ORDER — CHOLECALCIFEROL (VITAMIN D3) 25 MCG
1000 TABLET ORAL DAILY
Status: DISCONTINUED | OUTPATIENT
Start: 2024-08-15 | End: 2024-08-17 | Stop reason: HOSPADM

## 2024-08-15 ASSESSMENT — HEART SCORE
RISK FACTORS: 1-2 RISK FACTORS
AGE: 45-64
HEART SCORE: 2
TROPONIN: LESS THAN OR EQUAL TO NORMAL LIMIT
HISTORY: SLIGHTLY SUSPICIOUS
ECG: NORMAL

## 2024-08-15 ASSESSMENT — PAIN DESCRIPTION - FREQUENCY: FREQUENCY: CONSTANT/CONTINUOUS

## 2024-08-15 ASSESSMENT — ENCOUNTER SYMPTOMS
CONSTITUTIONAL NEGATIVE: 1
EYES NEGATIVE: 1
GASTROINTESTINAL NEGATIVE: 1
NEUROLOGICAL NEGATIVE: 1
MUSCULOSKELETAL NEGATIVE: 1

## 2024-08-15 ASSESSMENT — PAIN - FUNCTIONAL ASSESSMENT: PAIN_FUNCTIONAL_ASSESSMENT: 0-10

## 2024-08-15 ASSESSMENT — PAIN SCALES - GENERAL
PAINLEVEL_OUTOF10: 10 - WORST POSSIBLE PAIN
PAINLEVEL_OUTOF10: 10 - WORST POSSIBLE PAIN
PAINLEVEL_OUTOF10: 6

## 2024-08-15 ASSESSMENT — PAIN DESCRIPTION - ONSET: ONSET: SUDDEN

## 2024-08-15 ASSESSMENT — PAIN DESCRIPTION - PAIN TYPE: TYPE: ACUTE PAIN

## 2024-08-15 ASSESSMENT — PAIN DESCRIPTION - DESCRIPTORS: DESCRIPTORS: ACHING

## 2024-08-15 ASSESSMENT — PAIN DESCRIPTION - LOCATION: LOCATION: CHEST

## 2024-08-15 NOTE — ED PROVIDER NOTES
History of Present Illness     History provided by: Patient and EMS  Limitations to History: None  External Records Reviewed with Brief Summary:  Previous ED visit from August 2024 for malaise that demonstrated normal troponin    HPI:  Link Macias is a 63 y.o. male past medical history of bilateral AKA due to peripheral arterial disease, DM, HTN, HLD who presents today for left shoulder pain.  EMS states that they were initially called for chest pain.  Patient endorses right anterior shoulder pain, but no chest pain or shortness of breath.  He denies any previous cardiac history, no history of PCI or stents placed.  He denies any radiation of this pain.  He denies any nausea vomiting diarrhea or abdominal pain.  He denies any headaches changes vision or changes in his hearing.    Physical Exam   Triage vitals:  T 36.1 °C (97 °F)  HR 56  /60  RR 18  O2 97 % None (Room air)    Physical Exam  Constitutional:       General: He is not in acute distress.     Appearance: Normal appearance. He is well-developed. He is not ill-appearing or diaphoretic.   HENT:      Head: Normocephalic and atraumatic.      Mouth/Throat:      Mouth: Mucous membranes are moist.      Pharynx: No oropharyngeal exudate or posterior oropharyngeal erythema.   Eyes:      General: No scleral icterus.     Extraocular Movements: Extraocular movements intact.      Pupils: Pupils are equal, round, and reactive to light.   Cardiovascular:      Rate and Rhythm: Normal rate and regular rhythm.      Pulses: Normal pulses.           Radial pulses are 2+ on the right side and 2+ on the left side.      Heart sounds: Normal heart sounds. No murmur heard.     No gallop.   Pulmonary:      Effort: Pulmonary effort is normal. No respiratory distress.      Breath sounds: Normal breath sounds. No stridor. No wheezing, rhonchi or rales.   Chest:      Chest wall: No mass or tenderness.   Abdominal:      General: Bowel sounds are normal. There is no  distension.      Palpations: Abdomen is soft. There is no mass.      Tenderness: There is no abdominal tenderness. There is no guarding or rebound.      Hernia: No hernia is present.   Musculoskeletal:         General: No swelling, deformity or signs of injury. Normal range of motion.      Cervical back: Normal range of motion and neck supple. No tenderness.      Comments: Absent bilateral lower extremities, tenderness to palpation over anterior left shoulder with full active range of motion without palpable crepitus or deformity   Skin:     General: Skin is warm.      Capillary Refill: Capillary refill takes less than 2 seconds.      Findings: No erythema, lesion or rash.   Neurological:      General: No focal deficit present.      Mental Status: He is alert and oriented to person, place, and time. Mental status is at baseline.   Psychiatric:         Mood and Affect: Mood normal.         Behavior: Behavior normal.          Medical Decision Making & ED Course   Medical Decision Makin y.o. male past medical history ofbilateral AKA, DM, HTN, HLD who presents today for left shoulder/chest pain.  Patient initially called EMS for chest pain, however, it appears that the patient's chest pain is actually left shoulder pain.  However, given the fact the patient has multiple risk factors for ACS, we will get a troponin as well as a BNP as well as EKG.  Patient's initial labs demonstrate no significant abnormalities, however, the patient did have a troponin of 5, so will require a repeat.  Patient's EKG was nonischemic.  I did provide the patient with Toradol as well as lidocaine patch and methocarbamol, which did relieve the patient's symptoms of shoulder pain.  Given this, we will plan to discharge the patient with these medications once his repeat labs are back.  Patient be signed out to the oncoming team pending repeat troponin and discharge.  ----  Scoring Tools Utilized: HEART Score: 2       Differential diagnoses  considered include but are not limited to: Shoulder dislocation, shoulder strain, ACS, heart failure     Social Determinants of Health which Significantly Impact Care: Difficulty obtaining outpatient follow-up and Transportation difficulties     EKG Independent Interpretation:  EKG demonstrates sinus bradycardia with a rate of 58 without evidence of ST elevation, ST depression or T wave inversion, normal axis and normal intervals.  Similar to previously obtained EKGs most recently 8/2024    Independent Result Review and Interpretation: Relevant laboratory and radiographic results were reviewed and independently interpreted by myself.  As necessary, they are commented on in the ED Course.    Chronic conditions affecting the patient's care: As documented above in MDM    The patient was discussed with the following consultants/services: None    Care Considerations: As documented above in [facility]    ED Course:  ED Course as of 08/15/24 1701   Thu Aug 15, 2024   1400 Emergency Medicine Attending Attestation:     [unfilled]    The patient was seen by the resident/fellow.  I have personally performed a substantive portion of the encounter.  I have seen and examined the patient; agree with the workup, evaluation, MDM, management and diagnosis.  The care plan has been discussed with the resident; I have reviewed the resident's note and agree with the documented findings.      Patient with a past medical history of PVD (s/p bilateral AKA), diabetes, and HTN who presents to the emergency department for left shoulder pain.  Shoulder pain has been intermittent over the past few weeks without any chest pain or shortness of breath.  On my exam, patient is lying comfortably in the gurney on his stomach.  He does not appear in any acute pain.  He does have some mild tenderness palpation of the left shoulder with full range of motion.  Breath sounds are clear bilaterally with symmetric chest rise and he has a regular rate on his cardiac  exam.  Abdomen is soft, benign and nontender.  We will evaluate for possible cause of his shoulder pain including possible cardiac causes or other atypical causes.    I independently interpreted patient's EKG and agree with the above mentioned interpretation.      Cory Vasquez MD   [AM]   1654 Chest x-ray negative, troponin was negative delta Trope was negative.  Electrolytes showed a hypokalemia which was repleted. [LAUREL]      ED Course User Index  [AM] Cory Vasquez MD  [LAUREL] Nadja Duenas MD         Diagnoses as of 08/15/24 1701   Chest pain, unspecified type   Acute pain of left shoulder     Disposition   Patient was signed out to Dr. Duenas at 1500 pending completion of their work-up.  Please see the next provider's transition of care note for the remainder of the patient's care.     Procedures   Procedures    Patient seen and discussed with ED attending physician.    Ladarius Fuentes MD  Emergency Medicine       Ladarius Fuentes MD  Resident  08/15/24 1648       Ladarius Fuentes MD  Resident  08/15/24 1701

## 2024-08-15 NOTE — ED TRIAGE NOTES
"Pt arrives to ED via EMS. Pt states that he has been having ongoing chest pain x 7 days. He states that it has not gone away and he thinks it may \"just be muscular\" but wanted to be checked out. Pt vitals were stable upon arrival and he denies any needs at this time.   "

## 2024-08-15 NOTE — PROGRESS NOTES
Emergency Department Transition of Care Note       Signout   I received Link Macias in signout from Dr. Fuentes.  Please see the ED Provider Note for all HPI, PE and MDM up to the time of signout at 3.  This is in addition to the primary record.    In brief Link Macias is an 63 y.o. male presenting for past medical history of peripheral vascular disease, diabetes and hypertension who is presenting today with shoulder pain.      At the time of signout we were awaiting:  Delta troponin and RFP    ED Course & Medical Decision Making   Medical Decision Making:  Under my care, delta troponin resulted which was negative.  RFP resulted which was showed a hypokalemia which was repleted.  Patient to be admitted for cardiac risk stratification given no recent cardiac testing. CDU admission was considered however patient has bilateral lower extremity amputations.     ED Course:  ED Course as of 08/15/24 1719   Thu Aug 15, 2024   1400 Emergency Medicine Attending Attestation:     [unfilled]    The patient was seen by the resident/fellow.  I have personally performed a substantive portion of the encounter.  I have seen and examined the patient; agree with the workup, evaluation, MDM, management and diagnosis.  The care plan has been discussed with the resident; I have reviewed the resident's note and agree with the documented findings.      Patient with a past medical history of PVD (s/p bilateral AKA), diabetes, and HTN who presents to the emergency department for left shoulder pain.  Shoulder pain has been intermittent over the past few weeks without any chest pain or shortness of breath.  On my exam, patient is lying comfortably in the gurney on his stomach.  He does not appear in any acute pain.  He does have some mild tenderness palpation of the left shoulder with full range of motion.  Breath sounds are clear bilaterally with symmetric chest rise and he has a regular rate on his cardiac exam.  Abdomen is soft,  benign and nontender.  We will evaluate for possible cause of his shoulder pain including possible cardiac causes or other atypical causes.    I independently interpreted patient's EKG and agree with the above mentioned interpretation.      Cory Vasquez MD   [AM]   1654 Chest x-ray negative, troponin was negative delta Trope was negative.  Electrolytes showed a hypokalemia which was repleted. [LAUREL]      ED Course User Index  [AM] Cory Vasquez MD  [LAUREL] Nadja Duenas MD         Diagnoses as of 08/15/24 1719   Chest pain, unspecified type   Acute pain of left shoulder       Disposition   As a result of the work-up, the patient was discharged home.  he was informed of his diagnosis and instructed to come back with any concerns or worsening of condition.  he and was agreeable to the plan as discussed above.  he was given the opportunity to ask questions.  All of the patient's questions were answered.    Procedures   Procedures    Patient seen and discussed with the ED physician.     Nadja Duenas MD  Emergency Medicine

## 2024-08-15 NOTE — H&P
HPI     Mr. Macias is a 63-year-old male with PMHx: Bilateral AKA, PAD, T2DM, HTN, HLD who presented to the emergency room today for complaint of left shoulder pain that moved down his chest states he has come off couple times in the past 2 to 3 weeks for the same complaint and it does not seem to be getting better.  States nothing makes it better but movement does make it worse.    While in the ED patient was noted to have vitals that were WNL and labs that were mainly WNL potassium was 3.1 and CXR showed no acute cardiopulmonary process.  Patient to be admitted observation for chest pain    ROS/EXAM     Review of Systems   Constitutional: Negative.    HENT: Negative.     Eyes: Negative.    Cardiovascular:  Positive for chest pain.   Gastrointestinal: Negative.    Genitourinary: Negative.    Musculoskeletal: Negative.    Skin: Negative.    Neurological: Negative.    All other systems reviewed and are negative.    Physical Exam  Vitals reviewed.   Constitutional:       Appearance: Normal appearance.   HENT:      Head: Normocephalic.      Mouth/Throat:      Mouth: Mucous membranes are dry.   Eyes:      Extraocular Movements: Extraocular movements intact.      Conjunctiva/sclera: Conjunctivae normal.      Pupils: Pupils are equal, round, and reactive to light.   Cardiovascular:      Rate and Rhythm: Normal rate and regular rhythm.      Pulses: Normal pulses.      Heart sounds: Normal heart sounds, S1 normal and S2 normal.   Pulmonary:      Effort: Pulmonary effort is normal.      Breath sounds: Normal breath sounds and air entry.   Abdominal:      General: Abdomen is flat. Bowel sounds are normal.      Palpations: Abdomen is soft.   Musculoskeletal:         General: Normal range of motion.      Cervical back: Full passive range of motion without pain and normal range of motion.      Comments: FAHEEM AKA   Skin:     General: Skin is warm and dry.   Neurological:      General: No focal deficit present.      Mental  "Status: He is alert and oriented to person, place, and time. Mental status is at baseline.   Psychiatric:         Attention and Perception: Attention normal.         Mood and Affect: Mood normal.         Speech: Speech normal.         Histories       Vitals/LABS/RESULTS     Last Recorded Vitals  Blood pressure 119/64, pulse 62, temperature 36.1 °C (97 °F), temperature source Tympanic, resp. rate 17, height 1.702 m (5' 7\"), weight 72.6 kg (160 lb), SpO2 98%.  Intake/Output last 3 Shifts:  No intake/output data recorded.    Relevant Results  Lab Results   Component Value Date    WBC 3.5 (L) 08/15/2024    HGB 15.4 08/15/2024    HCT 46.7 08/15/2024    MCV 75 (L) 08/15/2024     08/15/2024      Lab Results   Component Value Date    GLUCOSE 66 (L) 08/15/2024    CALCIUM 6.7 (L) 08/15/2024     08/15/2024    K 3.1 (L) 08/15/2024    CO2 20 (L) 08/15/2024     (H) 08/15/2024    BUN 17 08/15/2024    CREATININE 0.63 08/15/2024     XR chest 1 view    Result Date: 8/15/2024  Interpreted By:  Archana Baez,  and Dulla Kireeti STUDY: XR CHEST 1 VIEW;  8/15/2024 2:35 pm   INDICATION: Signs/Symptoms:chest pain.   COMPARISON: Chest x-ray from 08/04/2024   ACCESSION NUMBER(S): [id number]   ORDERING CLINICIAN: FERNIE LARRY   FINDINGS: AP radiograph of the chest was provided.     CARDIOMEDIASTINAL SILHOUETTE: Cardiomediastinal silhouette is normal in size and configuration.   LUNGS: Low lung volumes with bronchovascular crowding. No pleural effusion or pneumothorax is seen.   ABDOMEN: No remarkable upper abdominal findings.   BONES: No acute osseous changes.       1. No acute cardiopulmonary process.   I personally reviewed the images/study and I agree with the findings as stated by Rickey Dukes MD, PGY-2 this study was interpreted at [facility], [place], [place].   MACRO: None   Signed by: Archana Baez 8/15/2024 3:00 PM Dictation workstation:   [id number]    Medications "     Scheduled medications  acetaminophen, 975 mg, oral, q6h  aspirin, 81 mg, oral, Daily  atorvastatin, 80 mg, oral, Nightly  cholecalciferol, 1,000 Units, oral, Daily  DULoxetine, 60 mg, oral, Daily  [START ON 8/16/2024] empagliflozin, 25 mg, oral, Daily with breakfast  enoxaparin, 40 mg, subcutaneous, q24h  famotidine, 20 mg, oral, Daily  lidocaine, 1 patch, transdermal, Once  losartan, 100 mg, oral, Daily  melatonin, 3 mg, oral, Nightly  metoprolol tartrate, 25 mg, oral, BID  OXcarbazepine, 150 mg, oral, BID  perflutren lipid microspheres, 0.5-10 mL of dilution, intravenous, Once in imaging  perflutren lipid microspheres, 0.5-10 mL of dilution, intravenous, Once in imaging  perflutren protein A microsphere, 0.5 mL, intravenous, Once in imaging  pregabalin, 150 mg, oral, Daily  regadenoson, 0.4 mg, intravenous, Once in imaging  sennosides-docusate sodium, 2 tablet, oral, BID  sulfur hexafluoride microsphr, 2 mL, intravenous, Once in imaging      Continuous medications     PRN medications  PRN medications: aminophylline, hydrOXYzine HCL, methocarbamol    PLAN     Mr. Macias is a 63-year-old male with PMHx: Bilateral AKA, PAD, T2DM, HTN, HLD who presented to the emergency room today for complaint of left shoulder pain that moved down his chest states he has come off couple times in the past 2 to 3 weeks for the same complaint and it does not seem to be getting better.  States nothing makes it better but movement does make it worse.    While in the ED patient was noted to have vitals that were WNL and labs that were mainly WNL potassium was 3.1 and CXR showed no acute cardiopulmonary process.  Patient to be admitted observation for chest pain  Assessment/Plan:    Chest pain  -Telemetry  -Echo  -Nuclear stress test in a.m. 8/16/2024  -N.p.o. after midnight for stress test    HTN  HLD  -Continue home baby aspirin 81 mg daily  -Continue home atorvastatin 80 mg daily  -Continue home losartan 100 mg daily  -Continue home  metoprolol tartrate 25 mg twice daily    T2DM  Neuropathy/phantom leg pain  -Hold home metformin  -Mild Humalog SSI 3 times daily and at bedtime while in the hospital  -Continue home Jardiance 25mg daily  -Continue home Lyrica 150 mg daily  -Continue home duloxetine 60 mg daily    GERD  -Continue home famotidine milligrams daily      Fluids: monitor and replete as needed  Electrolytes: monitor and replete as needed  Nutrition: Regular diet   GI prophylaxis: Famotidine  DVT prophylaxis: SCD  Code Status: Full code  PCP  Pharmacy    Disposition:  Admitted for above diagnoses. Plan per above. Anticipate observation stay      Ave Alegre, APRN-CNP         I spent 75 minutes in the professional and overall care on this encounter before, during and after the visit, examining the patient, reviewing labs, writing orders and documenting the note

## 2024-08-16 ENCOUNTER — APPOINTMENT (OUTPATIENT)
Dept: RADIOLOGY | Facility: HOSPITAL | Age: 64
End: 2024-08-16
Payer: MEDICAID

## 2024-08-16 ENCOUNTER — APPOINTMENT (OUTPATIENT)
Dept: CARDIOLOGY | Facility: HOSPITAL | Age: 64
End: 2024-08-16
Payer: MEDICAID

## 2024-08-16 ENCOUNTER — PHARMACY VISIT (OUTPATIENT)
Dept: PHARMACY | Facility: CLINIC | Age: 64
End: 2024-08-16
Payer: MEDICAID

## 2024-08-16 VITALS
HEIGHT: 67 IN | RESPIRATION RATE: 14 BRPM | BODY MASS INDEX: 25.11 KG/M2 | HEART RATE: 58 BPM | TEMPERATURE: 97.3 F | WEIGHT: 160 LBS | SYSTOLIC BLOOD PRESSURE: 145 MMHG | OXYGEN SATURATION: 97 % | DIASTOLIC BLOOD PRESSURE: 72 MMHG

## 2024-08-16 PROBLEM — R07.9 CHEST PAIN: Status: RESOLVED | Noted: 2024-08-15 | Resolved: 2024-08-16

## 2024-08-16 LAB
ALBUMIN SERPL BCP-MCNC: 3.3 G/DL (ref 3.4–5)
ANION GAP SERPL CALC-SCNC: 14 MMOL/L (ref 10–20)
AORTIC VALVE MEAN GRADIENT: 7 MMHG
AORTIC VALVE PEAK VELOCITY: 1.84 M/S
AV PEAK GRADIENT: 13.5 MMHG
AVA (PEAK VEL): 1.64 CM2
AVA (VTI): 1.6 CM2
BUN SERPL-MCNC: 22 MG/DL (ref 6–23)
CALCIUM SERPL-MCNC: 7.4 MG/DL (ref 8.6–10.6)
CHLORIDE SERPL-SCNC: 111 MMOL/L (ref 98–107)
CO2 SERPL-SCNC: 19 MMOL/L (ref 21–32)
CREAT SERPL-MCNC: 0.82 MG/DL (ref 0.5–1.3)
EGFRCR SERPLBLD CKD-EPI 2021: >90 ML/MIN/1.73M*2
EJECTION FRACTION APICAL 4 CHAMBER: 66.8
EJECTION FRACTION: 73 %
ERYTHROCYTE [DISTWIDTH] IN BLOOD BY AUTOMATED COUNT: 14.4 % (ref 11.5–14.5)
GLUCOSE BLD MANUAL STRIP-MCNC: 122 MG/DL (ref 74–99)
GLUCOSE BLD MANUAL STRIP-MCNC: 137 MG/DL (ref 74–99)
GLUCOSE BLD MANUAL STRIP-MCNC: 92 MG/DL (ref 74–99)
GLUCOSE SERPL-MCNC: 114 MG/DL (ref 74–99)
HCT VFR BLD AUTO: 39.6 % (ref 41–52)
HGB BLD-MCNC: 13.2 G/DL (ref 13.5–17.5)
LEFT ATRIUM VOLUME AREA LENGTH INDEX BSA: 23.9 ML/M2
LEFT VENTRICLE INTERNAL DIMENSION DIASTOLE: 4.5 CM (ref 3.5–6)
LEFT VENTRICULAR OUTFLOW TRACT DIAMETER: 2 CM
MCH RBC QN AUTO: 25.5 PG (ref 26–34)
MCHC RBC AUTO-ENTMCNC: 33.3 G/DL (ref 32–36)
MCV RBC AUTO: 76 FL (ref 80–100)
MITRAL VALVE E/A RATIO: 0.99
NRBC BLD-RTO: 0 /100 WBCS (ref 0–0)
PHOSPHATE SERPL-MCNC: 4.2 MG/DL (ref 2.5–4.9)
PLATELET # BLD AUTO: 188 X10*3/UL (ref 150–450)
POTASSIUM SERPL-SCNC: 3.9 MMOL/L (ref 3.5–5.3)
RBC # BLD AUTO: 5.18 X10*6/UL (ref 4.5–5.9)
RIGHT VENTRICLE FREE WALL PEAK S': 9.9 CM/S
SODIUM SERPL-SCNC: 140 MMOL/L (ref 136–145)
WBC # BLD AUTO: 3.2 X10*3/UL (ref 4.4–11.3)

## 2024-08-16 PROCEDURE — 82947 ASSAY GLUCOSE BLOOD QUANT: CPT

## 2024-08-16 PROCEDURE — 85027 COMPLETE CBC AUTOMATED: CPT | Performed by: NURSE PRACTITIONER

## 2024-08-16 PROCEDURE — 82947 ASSAY GLUCOSE BLOOD QUANT: CPT | Mod: 59

## 2024-08-16 PROCEDURE — 93306 TTE W/DOPPLER COMPLETE: CPT | Performed by: INTERNAL MEDICINE

## 2024-08-16 PROCEDURE — 78452 HT MUSCLE IMAGE SPECT MULT: CPT | Performed by: NUCLEAR MEDICINE

## 2024-08-16 PROCEDURE — 2500000001 HC RX 250 WO HCPCS SELF ADMINISTERED DRUGS (ALT 637 FOR MEDICARE OP): Mod: SE | Performed by: NURSE PRACTITIONER

## 2024-08-16 PROCEDURE — G0378 HOSPITAL OBSERVATION PER HR: HCPCS

## 2024-08-16 PROCEDURE — 93016 CV STRESS TEST SUPVJ ONLY: CPT | Performed by: INTERNAL MEDICINE

## 2024-08-16 PROCEDURE — A9502 TC99M TETROFOSMIN: HCPCS | Mod: SE | Performed by: STUDENT IN AN ORGANIZED HEALTH CARE EDUCATION/TRAINING PROGRAM

## 2024-08-16 PROCEDURE — 3430000001 HC RX 343 DIAGNOSTIC RADIOPHARMACEUTICALS: Mod: SE | Performed by: STUDENT IN AN ORGANIZED HEALTH CARE EDUCATION/TRAINING PROGRAM

## 2024-08-16 PROCEDURE — 2500000002 HC RX 250 W HCPCS SELF ADMINISTERED DRUGS (ALT 637 FOR MEDICARE OP, ALT 636 FOR OP/ED): Mod: SE | Performed by: NURSE PRACTITIONER

## 2024-08-16 PROCEDURE — 93306 TTE W/DOPPLER COMPLETE: CPT

## 2024-08-16 PROCEDURE — RXMED WILLOW AMBULATORY MEDICATION CHARGE

## 2024-08-16 PROCEDURE — 36415 COLL VENOUS BLD VENIPUNCTURE: CPT | Performed by: NURSE PRACTITIONER

## 2024-08-16 PROCEDURE — 93017 CV STRESS TEST TRACING ONLY: CPT

## 2024-08-16 PROCEDURE — 2500000004 HC RX 250 GENERAL PHARMACY W/ HCPCS (ALT 636 FOR OP/ED): Mod: SE | Performed by: NURSE PRACTITIONER

## 2024-08-16 PROCEDURE — 80069 RENAL FUNCTION PANEL: CPT | Performed by: NURSE PRACTITIONER

## 2024-08-16 PROCEDURE — 93018 CV STRESS TEST I&R ONLY: CPT | Performed by: INTERNAL MEDICINE

## 2024-08-16 PROCEDURE — 99239 HOSP IP/OBS DSCHRG MGMT >30: CPT | Performed by: STUDENT IN AN ORGANIZED HEALTH CARE EDUCATION/TRAINING PROGRAM

## 2024-08-16 PROCEDURE — 78452 HT MUSCLE IMAGE SPECT MULT: CPT

## 2024-08-16 RX ORDER — MELOXICAM 7.5 MG/1
7.5 TABLET ORAL DAILY
Qty: 14 TABLET | Refills: 0 | OUTPATIENT
Start: 2024-08-16 | End: 2024-08-19

## 2024-08-16 ASSESSMENT — PAIN SCALES - GENERAL
PAINLEVEL_OUTOF10: 0 - NO PAIN
PAINLEVEL_OUTOF10: 0 - NO PAIN

## 2024-08-16 NOTE — DISCHARGE SUMMARY
Date of Admission: 8/15/2024    Date of Discharge: 8/16/2024    Discharge Diagnosis  Left upper Chest/shoulder  pain, suspected tendonitis    Issues Requiring Follow-Up  Home care orders may need to be submitted by pcp for [facility], pt aware and to call pcp on Monday.     Discharge Meds     Your medication list        START taking these medications        Instructions Last Dose Given Next Dose Due   meloxicam 7.5 mg tablet  Commonly known as: Mobic      Take 1 tablet (7.5 mg) by mouth once daily for 14 days.              CONTINUE taking these medications        Instructions Last Dose Given Next Dose Due   acetaminophen 325 mg tablet  Commonly known as: Tylenol           aspirin 81 mg EC tablet      Take 1 tablet (81 mg) by mouth once daily.       atorvastatin 80 mg tablet  Commonly known as: Lipitor      Take 1 tablet (80 mg) by mouth once daily at bedtime.       cholecalciferol 25 MCG (1000 UT) capsule  Commonly known as: Vitamin D-3           docusate sodium 100 mg capsule  Commonly known as: Colace           DULoxetine 60 mg DR capsule  Commonly known as: Cymbalta           empagliflozin 25 mg  Commonly known as: Jardiance      Take 1 tablet (25 mg) by mouth once daily with breakfast.       famotidine 20 mg tablet  Commonly known as: Pepcid           hydrocortisone 2.5 % rectal cream  Commonly known as: Anusol-HC           hydrOXYzine HCL 25 mg tablet  Commonly known as: Atarax           losartan 100 mg tablet  Commonly known as: Cozaar      Take 1 tablet (100 mg) by mouth once daily.       melatonin 3 mg tablet      Take 1 tablet (3 mg) by mouth once daily at bedtime.       metFORMIN 500 mg tablet  Commonly known as: Glucophage      Take 1 tablet (500 mg) by mouth 2 times a day with meals.       methocarbamol 500 mg tablet  Commonly known as: Robaxin           metoprolol tartrate 25 mg tablet  Commonly known as: Lopressor      Take 1 tablet (25 mg) by mouth 2 times a day.       naproxen 375 mg  tablet  Commonly known as: Naprosyn           nitroglycerin 0.4 mg SL tablet  Commonly known as: Nitrostat           omega-3 acid ethyl esters 1 gram capsule  Commonly known as: Lovaza           ondansetron 4 mg tablet  Commonly known as: Zofran           OXcarbazepine 150 mg tablet  Commonly known as: Trileptal      Take 1 tablet (150 mg) by mouth 2 times a day.       pregabalin 75 mg capsule  Commonly known as: Lyrica                     Where to Get Your Medications        These medications were sent to [facility]  [address], [address], [facility] [address]      Hours: 8AM to 6PM Mon-Fri, 8AM to 4PM Sat, 9AM to 1PM Sun Phone: [phone]   meloxicam 7.5 mg tablet         Test Results Pending At Discharge  Pending Labs       No current pending labs.            Hospital Course  Mr. Macias is a 63-year-old male with PMHx: Bilateral AKA, PAD, T2DM, HTN, HLD who presented to the emergency room today for complaint of left shoulder pain, intermittant for 2-3 weeks. EMS initially called for chest pain. No associted symptoms. ACS neg. Cxr showed no acute process.  Admitted for cardiac rule out, pending echo and stress testing. On exam, pain noted on palpation to the anterior shoulder, worsening with active range of motion, likely bicep tendonitis v. Rotator cuff, strength preserved. Echo showed hyperdynamic EF, mild MR. Stress testing was neg thus low likihood for clinically significant CAD leading to shoulder pain. Pt prescribed mobic and given home care orders to add on therapy and referred to orthopedics for eval given ongoing pain, to schedele if not improving with therapy. Per sw pt may need to have pcp submit for [facility], pt aware and in agreement with contacting pcp on Monday for orders.. Discharge plan of care discussed, education and counseling provided involving active problem care plan, warning signs,  risks/benefits of new medications or medication changes, follow-up care and  testing.  Patient advised to follow-up with her primary care within 1 week of discharge or the next available for posthospitalization transition of care.  There was verbalized understanding and agreement with discharge care plan.    Pertinent Physical Exam At Time of Discharge  On the day of discharge, No acute distress, interactive, no increased work of breathing, regular rate and rhythm, abdomen soft/nontender, b/l amputee at hip, left should ttp palpation bicipital groove and pain elicited with active motion/ resistence to movement.     Outpatient Follow-Up  Future Appointments   Date Time Provider Department Center   8/26/2024  8:30 AM Aliza Cisse MD [id number]       Pt instructed to take all medications as prescribed.  Keep all follow-up appointments.  Contact their primary care physician with any questions or concerns that arise.  Come to the emergency department with worsening of your symptoms or any other medical emergency. Instructed that any outpatient tests that are ordered for outpatient follow up are meant to expedite outpatient work up and management, and that the results are not followed or managed by the inpatient ordering team and MUST be followed up with the outpatient primary care or outpatient specialist.  Verbal understanding and agreement obtained to order tests for outpatient follow up purposes.       Time spent >30 minutes on discharge management.    Pastor Durham MD  \

## 2024-08-16 NOTE — HOSPITAL COURSE
Mr. Macias is a 63-year-old male with PMHx: Bilateral AKA, PAD, T2DM, HTN, HLD who presented to the emergency room today for complaint of left shoulder pain, intermittant for 2-3 weeks. EMS initially called for chest pain. No associted symptoms. ACS neg. Cxr showed no acute process.  Admitted for cardiac rule out, pending echo and stress testing. On exam, pain noted on palpation to the anterior shoulder, worsening with active range of motion, likely bicep tendonitis v. Rotator cuff, strength preserved. Echo showed hyperdynamic EF, mild MR. Stress testing was neg thus low likihood for clinically significant CAD leading to shoulder pain. Pt prescribed mobic and given home care orders to add on therapy and referred to orthopedics for eval given ongoing pain, to schedele if not improving with therapy. Per sw pt may need to have pcp submit for [facility], pt aware and in agreement with contacting pcp on Monday for orders.

## 2024-08-16 NOTE — PROGRESS NOTES
Pharmacy Admission Order Reconciliation Review    Link Macias is a 63 y.o. male admitted for Chest pain. Pharmacy reviewed the patient's unreconciled admission medications.    Prior to admission medications that were reviewed and acted on by the pharmacist include:  Acetaminophen  naproxen  These medications have been reconciled.     Any other unreconcilied medications have been addressed and will be ordered or held by the patient's medical team. Medications addressed by the pharmacist may be added or changed by the patient's medical team at any time.    Ashanti Osorio, PharmD  Transitions of Care Pharmacist  [facility]  Please reach out via Secure Chat for questions

## 2024-08-16 NOTE — PROGRESS NOTES
Link Macias is a 63 y.o. male on day 0 of admission presenting with Chest pain.    Assessment/Plan   SW consulted by provider for PT HH. SW reviewed records and observed Pt ended OP PT on 6/28 with [facility]. Due to them being Pt's primary care provider, Pt can contact PCP to arrange HH within his hospital system of [facility]. Provider asked for outgoing orders. Collaborated with Dr Luis and Pt is reportedly receptive to contacting his PCP. No further needs from SW.     SILAS Moreno

## 2024-08-16 NOTE — DISCHARGE INSTRUCTIONS
Your left shoulder pain appears to be musculoskeletal and likely related to tendonitis based on your exam. Your echo showed normal pumping function with mild leakage at the mitral valve. Please continue to modify vascular risk factors with blood pressure control, cholesterol control, diet, activity, and seeing your primary care. You will be prescribed mobic (an nsaid) and tylenol as well as home health care services for physical therapy. Please do not take other NSAIDs while on mobic. Please follow up with your primary care. Home care orders will be placed to include physical therapy. Per discusion with our social work, you may need to call your primary care on Monday to have the ordered submitted by your primary for these services.     Follow-up with primary care within 7 to 10 days or next soonest available for post-hospitalization assessment and examination.     Please take all medications as prescribed and keep all follow-up appointments.  Please contact your primary care physician with any questions or concerns that arise.  You may contact your outpatient specialists office for any questions regarding specifics relating to their recommendations. Please monitor your symptoms and come to the emergency department with worsening of your symptoms, severe chest pain, shortness of breath, or any other medical emergency or concerns for your health.

## 2024-08-16 NOTE — PROGRESS NOTES
Pharmacy Medication History Review    Link Macias is a 63 y.o. male admitted for Chest pain. Pharmacy reviewed the patient's [id number]-[id number] medications and allergies for accuracy.    The list below reflects the updated PTA list. Comments regarding how patient may be taking medications differently can be found in the Admit Orders Activity  Prior to Admission Medications   Prescriptions Last Dose Informant   DULoxetine (Cymbalta) 60 mg DR capsule 8/14/2024 Self   Sig: Take 1 capsule (60 mg) by mouth once daily.   OXcarbazepine (Trileptal) 150 mg tablet 8/14/2024 Self   Sig: Take 1 tablet (150 mg) by mouth 2 times a day.   acetaminophen (Tylenol) 325 mg tablet 8/14/2024 Self   Sig: Take 2 tablets (650 mg) by mouth every 6 hours if needed for mild pain (1 - 3).   aspirin 81 mg EC tablet 8/14/2024 Self   Sig: Take 1 tablet (81 mg) by mouth once daily.   atorvastatin (Lipitor) 80 mg tablet 8/14/2024 Self   Sig: Take 1 tablet (80 mg) by mouth once daily at bedtime.   cholecalciferol (Vitamin D-3) 25 MCG (1000 UT) capsule 8/14/2024 Self   Sig: Take 1 capsule (25 mcg) by mouth once daily.   docusate sodium (Colace) 100 mg capsule Past Week Self   Sig: Take 1 capsule (100 mg) by mouth 2 times a day as needed for constipation.   empagliflozin (Jardiance) 25 mg 8/14/2024 Self   Sig: Take 1 tablet (25 mg) by mouth once daily with breakfast.   famotidine (Pepcid) 20 mg tablet 8/14/2024 Self   Sig: Take 1 tablet (20 mg) by mouth once daily.   hydrOXYzine HCL (Atarax) 25 mg tablet Past Week Self   Sig: Take 1 tablet (25 mg) by mouth once daily as needed for itching or anxiety.   hydrocortisone (Anusol-HC) 2.5 % rectal cream Past Week Self   Sig: Insert 1 Application into the rectum 2 times a day as needed for hemorrhoids.   losartan (Cozaar) 100 mg tablet 8/14/2024 Self   Sig: Take 1 tablet (100 mg) by mouth once daily.   melatonin 3 mg tablet 8/14/2024 Self   Sig: Take 1 tablet (3 mg) by mouth once daily at bedtime.    metFORMIN (Glucophage) 500 mg tablet 8/14/2024 Self   Sig: Take 1 tablet (500 mg) by mouth 2 times a day with meals.   methocarbamol (Robaxin) 500 mg tablet Past Week Self   Sig: Take 1 tablet (500 mg) by mouth 2 times a day as needed.   metoprolol tartrate (Lopressor) 25 mg tablet 8/14/2024 Self   Sig: Take 1 tablet (25 mg) by mouth 2 times a day.   naproxen (Naprosyn) 375 mg tablet Past Week Self   Sig: Take 1 tablet (375 mg) by mouth every 12 hours if needed for mild pain (1 - 3).   nitroglycerin (Nitrostat) 0.4 mg SL tablet Past Week Self   Sig: Place 1 tablet (0.4 mg) under the tongue every 5 minutes if needed for chest pain. Up to 3 times. IF NO RELIEF CALL 911   omega-3 acid ethyl esters (Lovaza) 1 gram capsule 8/14/2024 Self   Sig: Take 2 capsules (2 g) by mouth once daily.   ondansetron (Zofran) 4 mg tablet Past Month Self   Sig: Take 1 tablet (4 mg) by mouth every 8 hours if needed for nausea or vomiting.   pregabalin (Lyrica) 75 mg capsule 8/14/2024 Self   Sig: Take 1 capsule (75 mg) by mouth 2 times a day.      Facility-Administered Medications: None         The list below reflects the updated allergy list. Please review each documented allergy for additional clarification and justification.  Allergies  Reviewed by Kasandra Galvan on 8/15/2024        Severity Reactions Comments    Penicillins Not Specified Unknown             Medications ADDED:  Acetaminophen 325 mg tablet   Naproxen 375 mg tablet   Nitroglycerin 0.4 mg SL tablet   Ondansetron 4 mg tablet   Medications CHANGED:  Pregabalin 150 mg capsule - updated dose to current dose of 75 mg.   Medications REMOVED:   None    Patient accepts M2B at discharge. Pharmacy has been updated to [facility] the [facility].    Sources used to complete the med history include :  [facility] - 8/14/2024 Pregabalin 75 mg capsule 60 # / 30 days                      -07/30/2024 Gabapentin 100 mg capsule 42 # / 14 days   Patient interview   Patient dispense hx    Chart  Review     Below are additional concerns with the patient's PTA list.    Pregabalin 150 mg capsules have been updated to Pregabalin 75 mg capsules . Patient most recent dispense of Pregabalin 75 mg capsules was 08/14/2024 for a 30 days supply with 60 capsules dispensed.   Pt is a reliable historian.          Kasandra Galvan  Transitions of Care Pharmacy Tech 2   [facility]  Please reach out via Secure Chat for questions, or if no response call [facility] or [facility]

## 2024-08-19 ENCOUNTER — HOSPITAL ENCOUNTER (EMERGENCY)
Facility: HOSPITAL | Age: 64
Discharge: HOME | End: 2024-08-20
Attending: EMERGENCY MEDICINE
Payer: MEDICAID

## 2024-08-19 DIAGNOSIS — T50.905A ADVERSE EFFECT OF DRUG, INITIAL ENCOUNTER: Primary | ICD-10-CM

## 2024-08-19 LAB
ANION GAP SERPL CALC-SCNC: 16 MMOL/L (ref 10–20)
APPEARANCE UR: CLEAR
BASOPHILS # BLD AUTO: 0.04 X10*3/UL (ref 0–0.1)
BASOPHILS NFR BLD AUTO: 1.1 %
BILIRUB UR STRIP.AUTO-MCNC: NEGATIVE MG/DL
BUN SERPL-MCNC: 20 MG/DL (ref 6–23)
CALCIUM SERPL-MCNC: 9.5 MG/DL (ref 8.6–10.6)
CHLORIDE SERPL-SCNC: 106 MMOL/L (ref 98–107)
CO2 SERPL-SCNC: 22 MMOL/L (ref 21–32)
COLOR UR: ABNORMAL
CREAT SERPL-MCNC: 0.85 MG/DL (ref 0.5–1.3)
EGFRCR SERPLBLD CKD-EPI 2021: >90 ML/MIN/1.73M*2
EOSINOPHIL # BLD AUTO: 0.07 X10*3/UL (ref 0–0.7)
EOSINOPHIL NFR BLD AUTO: 2 %
ERYTHROCYTE [DISTWIDTH] IN BLOOD BY AUTOMATED COUNT: 14.6 % (ref 11.5–14.5)
GLUCOSE BLD MANUAL STRIP-MCNC: 106 MG/DL (ref 74–99)
GLUCOSE SERPL-MCNC: 151 MG/DL (ref 74–99)
GLUCOSE UR STRIP.AUTO-MCNC: ABNORMAL MG/DL
HCT VFR BLD AUTO: 46.9 % (ref 41–52)
HGB BLD-MCNC: 15.5 G/DL (ref 13.5–17.5)
HIV 1+2 AB+HIV1 P24 AG SERPL QL IA: NONREACTIVE
IMM GRANULOCYTES # BLD AUTO: 0.01 X10*3/UL (ref 0–0.7)
IMM GRANULOCYTES NFR BLD AUTO: 0.3 % (ref 0–0.9)
KETONES UR STRIP.AUTO-MCNC: NEGATIVE MG/DL
LEUKOCYTE ESTERASE UR QL STRIP.AUTO: NEGATIVE
LYMPHOCYTES # BLD AUTO: 1.37 X10*3/UL (ref 1.2–4.8)
LYMPHOCYTES NFR BLD AUTO: 39.4 %
MCH RBC QN AUTO: 25.2 PG (ref 26–34)
MCHC RBC AUTO-ENTMCNC: 33 G/DL (ref 32–36)
MCV RBC AUTO: 76 FL (ref 80–100)
MONOCYTES # BLD AUTO: 0.31 X10*3/UL (ref 0.1–1)
MONOCYTES NFR BLD AUTO: 8.9 %
NEUTROPHILS # BLD AUTO: 1.68 X10*3/UL (ref 1.2–7.7)
NEUTROPHILS NFR BLD AUTO: 48.3 %
NITRITE UR QL STRIP.AUTO: NEGATIVE
NRBC BLD-RTO: 0 /100 WBCS (ref 0–0)
PH UR STRIP.AUTO: 5 [PH]
PLATELET # BLD AUTO: 243 X10*3/UL (ref 150–450)
POTASSIUM SERPL-SCNC: 4.3 MMOL/L (ref 3.5–5.3)
PROT UR STRIP.AUTO-MCNC: NEGATIVE MG/DL
RBC # BLD AUTO: 6.15 X10*6/UL (ref 4.5–5.9)
RBC # UR STRIP.AUTO: NEGATIVE /UL
SODIUM SERPL-SCNC: 140 MMOL/L (ref 136–145)
SP GR UR STRIP.AUTO: 1.02
TREPONEMA PALLIDUM IGG+IGM AB [PRESENCE] IN SERUM OR PLASMA BY IMMUNOASSAY: NONREACTIVE
UROBILINOGEN UR STRIP.AUTO-MCNC: NORMAL MG/DL
WBC # BLD AUTO: 3.5 X10*3/UL (ref 4.4–11.3)

## 2024-08-19 PROCEDURE — 99283 EMERGENCY DEPT VISIT LOW MDM: CPT

## 2024-08-19 PROCEDURE — 99284 EMERGENCY DEPT VISIT MOD MDM: CPT | Performed by: EMERGENCY MEDICINE

## 2024-08-19 PROCEDURE — 82947 ASSAY GLUCOSE BLOOD QUANT: CPT

## 2024-08-19 PROCEDURE — 87491 CHLMYD TRACH DNA AMP PROBE: CPT

## 2024-08-19 PROCEDURE — 82374 ASSAY BLOOD CARBON DIOXIDE: CPT

## 2024-08-19 PROCEDURE — 81003 URINALYSIS AUTO W/O SCOPE: CPT

## 2024-08-19 PROCEDURE — 86780 TREPONEMA PALLIDUM: CPT

## 2024-08-19 PROCEDURE — 2500000001 HC RX 250 WO HCPCS SELF ADMINISTERED DRUGS (ALT 637 FOR MEDICARE OP): Mod: SE

## 2024-08-19 PROCEDURE — 85025 COMPLETE CBC W/AUTO DIFF WBC: CPT

## 2024-08-19 PROCEDURE — 82947 ASSAY GLUCOSE BLOOD QUANT: CPT | Mod: 59

## 2024-08-19 PROCEDURE — 36415 COLL VENOUS BLD VENIPUNCTURE: CPT

## 2024-08-19 PROCEDURE — 87389 HIV-1 AG W/HIV-1&-2 AB AG IA: CPT

## 2024-08-19 RX ORDER — ACETAMINOPHEN 325 MG/1
975 TABLET ORAL ONCE
Status: COMPLETED | OUTPATIENT
Start: 2024-08-19 | End: 2024-08-19

## 2024-08-19 RX ORDER — DIPHENHYDRAMINE HCL 25 MG
25 CAPSULE ORAL ONCE
Status: COMPLETED | OUTPATIENT
Start: 2024-08-19 | End: 2024-08-19

## 2024-08-19 RX ORDER — DIPHENHYDRAMINE HCL 25 MG
25 CAPSULE ORAL EVERY 8 HOURS PRN
Qty: 9 CAPSULE | Refills: 0 | Status: SHIPPED | OUTPATIENT
Start: 2024-08-19 | End: 2024-08-19

## 2024-08-19 RX ORDER — DIPHENHYDRAMINE HCL 25 MG
25 CAPSULE ORAL EVERY 8 HOURS PRN
Qty: 9 CAPSULE | Refills: 0 | OUTPATIENT
Start: 2024-08-19 | End: 2024-08-21

## 2024-08-19 ASSESSMENT — PAIN - FUNCTIONAL ASSESSMENT: PAIN_FUNCTIONAL_ASSESSMENT: 0-10

## 2024-08-19 ASSESSMENT — PAIN SCALES - GENERAL: PAINLEVEL_OUTOF10: 6

## 2024-08-19 NOTE — ED PROVIDER NOTES
History of Present Illness     History provided by: Patient  Limitations to History: None  External Records Reviewed: Past ED visit notes, past discharge summaries    HPI:  Link Macias is a 63 y.o. male PMHx: Bilateral AKA, PAD, T2DM, HTN, HLD here for wound check.  Patient states that he was started on meloxicam 2 days ago and since then has had a new rash.  He states the rash began immediately after taking meloxicam.  He describes it as an all over spotted rash including his genital area.  He denies fevers or chills.  He denies sexual activity.  He denies itching but does state that it hurts.    Physical Exam   Triage vitals:  T 36 °C (96.8 °F)  HR 56  /79  RR 16  O2 98 % None (Room air)    General: Awake, alert, in no acute distress  Eyes: Gaze conjugate.  No scleral icterus or injection  HENT: Normo-cephalic, atraumatic. No stridor  CV: Regular rate, regular rhythm. Radial pulses 2+ bilaterally  Resp: Breathing non-labored, speaking in full sentences.  Clear to auscultation bilaterally  GI: Soft, non-distended, non-tender. No rebound or guarding.  : Has painless lesion, maculopapular on his genitalia  MSK/Extremities: No gross bony deformities. Moving all extremities  Skin: Warm. Appropriate color  Neuro: Alert. Oriented. Face symmetric. Speech is fluent.  Gross strength and sensation intact in b/l UE and LEs  Psych: Appropriate mood and affect    Medical Decision Making & ED Course   Medical Decision Makin y.o. male presents borderline hypertensive but otherwise hemodynamically stable for the evaluation of rash.  He has a rash in the setting of starting meloxicam a drug that is known for causing fixed drug eruptions.  His signs and symptoms are consistent with fixed drug eruption however we will obtain other basic laboratories to rule out other causes.  Differential diagnosis includes autoimmune vasculitis pulp or a fulminans less likely SJS, syphilis.  Laboratory studies ordered include  syphilis, HIV, gonorrhea and chlamydia, CBC and BMP. Lab studies are largely at baseline.  He is not systemically ill and there does not appear to be a superimposed bacterial infection.  No indication for empiric antibiotics at this time instructed to discontinue meloxicam and follow-up with primary care physician.  Instructed to return for febrile illness or worsening of the rash.  ----         Social Determinants of Health which Significantly Impact Care: None identified       Chronic conditions affecting the patient's care: See HPI    The patient was discussed with the following consultants/services: Please see ED course for consult transcript        ED Course:  Diagnoses as of 08/19/24 2210   Adverse effect of drug, initial encounter     Disposition   As a result of the work-up, the patient was discharged home.  he was informed of his diagnosis and instructed to come back with any concerns or worsening of condition.  he and was agreeable to the plan as discussed above.  he was given the opportunity to ask questions.  All of the patient's questions were answered.    Procedures   Procedures    Patient was seen and discussed with the attending of record.    Ruben Stoner MD  Emergency Medicine     Ruben Stoner MD  Resident  08/19/24 0801

## 2024-08-20 VITALS
HEART RATE: 74 BPM | TEMPERATURE: 97.9 F | SYSTOLIC BLOOD PRESSURE: 160 MMHG | DIASTOLIC BLOOD PRESSURE: 78 MMHG | WEIGHT: 150 LBS | OXYGEN SATURATION: 98 % | RESPIRATION RATE: 16 BRPM | BODY MASS INDEX: 23.49 KG/M2

## 2024-08-20 LAB — HOLD SPECIMEN: NORMAL

## 2024-08-20 ASSESSMENT — PAIN - FUNCTIONAL ASSESSMENT: PAIN_FUNCTIONAL_ASSESSMENT: 0-10

## 2024-08-20 NOTE — DISCHARGE INSTRUCTIONS
You are seen today in the emergency department due to concerns about a drug reaction.  Please stop taking the offending agent.  If you have persistent symptoms after stopping the drug please follow-up with your primary care physician.  Please return for fevers or chills.  Also please return if your rash gets worse.

## 2024-08-21 ENCOUNTER — HOSPITAL ENCOUNTER (EMERGENCY)
Facility: HOSPITAL | Age: 64
Discharge: HOME | End: 2024-08-21
Attending: EMERGENCY MEDICINE
Payer: MEDICAID

## 2024-08-21 VITALS
OXYGEN SATURATION: 95 % | TEMPERATURE: 97.6 F | WEIGHT: 150 LBS | SYSTOLIC BLOOD PRESSURE: 116 MMHG | RESPIRATION RATE: 18 BRPM | BODY MASS INDEX: 23.49 KG/M2 | HEART RATE: 66 BPM | DIASTOLIC BLOOD PRESSURE: 54 MMHG

## 2024-08-21 DIAGNOSIS — T50.905A ADVERSE EFFECT OF DRUG, INITIAL ENCOUNTER: ICD-10-CM

## 2024-08-21 DIAGNOSIS — R21 RASH: Primary | ICD-10-CM

## 2024-08-21 LAB
C TRACH RRNA SPEC QL NAA+PROBE: NEGATIVE
N GONORRHOEA DNA SPEC QL PROBE+SIG AMP: NEGATIVE

## 2024-08-21 PROCEDURE — 99283 EMERGENCY DEPT VISIT LOW MDM: CPT

## 2024-08-21 PROCEDURE — 99282 EMERGENCY DEPT VISIT SF MDM: CPT

## 2024-08-21 PROCEDURE — 2500000001 HC RX 250 WO HCPCS SELF ADMINISTERED DRUGS (ALT 637 FOR MEDICARE OP): Mod: SE | Performed by: NURSE PRACTITIONER

## 2024-08-21 PROCEDURE — 99283 EMERGENCY DEPT VISIT LOW MDM: CPT | Performed by: NURSE PRACTITIONER

## 2024-08-21 RX ORDER — ACETAMINOPHEN 325 MG/1
650 TABLET ORAL EVERY 6 HOURS PRN
Qty: 30 TABLET | Refills: 0 | Status: SHIPPED | OUTPATIENT
Start: 2024-08-21

## 2024-08-21 RX ORDER — DIPHENHYDRAMINE HCL 25 MG
25 CAPSULE ORAL EVERY 8 HOURS PRN
Qty: 9 CAPSULE | Refills: 0 | Status: SHIPPED | OUTPATIENT
Start: 2024-08-21 | End: 2024-08-24

## 2024-08-21 RX ORDER — DIPHENHYDRAMINE HCL 25 MG
25 CAPSULE ORAL ONCE
Status: COMPLETED | OUTPATIENT
Start: 2024-08-21 | End: 2024-08-21

## 2024-08-21 RX ORDER — DIPHENHYDRAMINE HCL 25 MG
25 CAPSULE ORAL EVERY 8 HOURS PRN
Qty: 9 CAPSULE | Refills: 0 | Status: SHIPPED | OUTPATIENT
Start: 2024-08-21 | End: 2024-08-21

## 2024-08-21 RX ORDER — ACETAMINOPHEN 325 MG/1
975 TABLET ORAL ONCE
Status: COMPLETED | OUTPATIENT
Start: 2024-08-21 | End: 2024-08-21

## 2024-08-21 RX ORDER — ACETAMINOPHEN 325 MG/1
650 TABLET ORAL EVERY 6 HOURS PRN
Qty: 30 TABLET | Refills: 0 | Status: SHIPPED | OUTPATIENT
Start: 2024-08-21 | End: 2024-08-21

## 2024-08-21 NOTE — ED TRIAGE NOTES
Pt to ED with c/o general body pain, genital pain/swelling, and left arm/shoulder pain.     Denies SOB/chest pain, denies dizziness/lightheadedness.     Pt seen on 8/19.

## 2024-08-21 NOTE — ED PROVIDER NOTES
HPI   Chief Complaint   Patient presents with    Groin Pain    Shoulder Pain         History provided by:  Patient   used: No        63 y.o male who is well-known to our emergency department with PMHof Bilateral AKA, PAD, T2DM, HTN, HLD chronic rash presents to the ED via EMS for evaluation of rash.  Pt states he was here on 8/19/24 for the same complaint from a reaction to his medication -meloxicam. States they gave him Benadryl and it helped him for the time being but states his insurance would not cover Benadryl for home, so he has not taken anything since last visit at ED. He reports discontinuing the Meloxicam. He states it is burning and itching starting below his L chest, throughout his abdomen and into his L groin area.  He denies any change in this rash and states that the rash that he has had frequently.  Pt denies any fever, chills, SOB, chest pain, dysphagia, nausea, vomiting or use of new creams/lotions/soaps. Denies other symptoms.  Denies any trauma, fall, injury or anticoagulation use.  Denies any additional symptoms or complaints this time.    ROS is otherwise negative unless stated above.      Patient History   Past Medical History:   Diagnosis Date    Anxiety     Diabetes mellitus (Multi)     HLD (hyperlipidemia)     Hypertension      Past Surgical History:   Procedure Laterality Date    CT ABDOMEN PELVIS ANGIOGRAM W AND/OR WO IV CONTRAST  12/16/2022    CT ABDOMEN PELVIS ANGIOGRAM W AND/OR WO IV CONTRAST 12/16/2022 [facility]    CT ABDOMEN PELVIS ANGIOGRAM W AND/OR WO IV CONTRAST  8/18/2023    CT ABDOMEN PELVIS ANGIOGRAM W AND/OR WO IV CONTRAST [place]20[place] [facility] [place]    [place] ABDOMEN PELVIS ANGIOGRAM W AND/OR WO IV CONTRAST  9/3/2023    CT ABDOMEN PELVIS ANGIOGRAM W AND/OR WO IV CONTRAST [place]/20[place] [facility] [place]     No family history on file.  Social History     Tobacco Use    Smoking status: Every Day     Current packs/day: 0.50     Types: Cigarettes    Smokeless tobacco: Never    Substance Use Topics    Alcohol use: Not Currently    Drug use: Never       Physical Exam   ED Triage Vitals [08/21/24 1229]   Temperature Heart Rate Respirations BP   36.4 °C (97.6 °F) 57 20 134/77      Pulse Ox Temp Source Heart Rate Source Patient Position   96 % Oral Monitor Lying      BP Location FiO2 (%)     Right arm 21 %       Physical Exam    VS: As documented in the triage note and EMR flowsheet from this visit were reviewed.    GEN: NAD, nontoxic, chronically ill-appearing, resting comfortably in ED cart without difficulty or dyspnea  EYES:  EOMs grossly intact, anicteric sclera, no nystagmus noted, clear and equal bilaterally, no foreign body noted  HEENT: Airway patent, ears with clear tympanic membranes bilaterally. Nasal mucosa clear. Mouth with normal mucosa.  No area of abscess or fluctuance noted.  No drainage noted. Throat has no vesicles, no oropharyngeal exudates and uvula is midline. Face with no lymph node enlargement. No trismus or drooling noted.  Handling secretions.  Speech clear.  CARD: RRR, nontender chest, no crepitus deformities, no JVD, no murmurs rubs or gallops ; No edema noted.  Positive pulses bilaterally throughout.  Capillary refill less than 3 seconds.  No abnormal redness, warmth, tenderness or swelling noted to bilateral lower extremities.  PULMONARY: Clear all lung fields. Moving air well, Nonlabored, no accessory muscle use, able to speak complete sentences.  No stridor noted  ABDOMEN: Abdomen soft, non-distended, no rebound, no guarding. Bowel sounds normal in all 4 quadrants. No tenderness to palpation.    : deferred  MUSK: Spine appears normal, range of motion is not limited, no muscle or joint tenderness. Strength 5 out of 5 equal bilaterally throughout.  No step-offs, deformities or additional signs of trauma noted.  No spinal/midline tenderness to palpation.  Bilateral AKA noted.  SKIN: Skin normal color for race, warm, dry and intact. No evidence of trauma.   Generalized macular, itchy, dry rash noted.  No discharge or drainage noted.  No fluctuance or abscess noted.  Nonvesicular.  Nonpainful.    NEURO: Alert and oriented x 3, speech is clear, no obvious deficits noted. No facial droop noted.    PSYCH: Alert and oriented to person, place, time/situation. normal mood and affect. No apparent risk to self or others. Thoughts are linear.  Does not appear decompensated.  Does not appear internally stimulated.  LYMPH: No adenopathy or splenomegaly. No cervical, supraclavicular or inguinal lymphadenopathy.  ED Course & MDM   Diagnoses as of 08/21/24 1319   Rash                 No data recorded     [place] Coma Scale Score: 15 (08/21/24 1232 : Erma Tim RN)                           Medical Decision Making    upon assessment patient was a chronically ill non-toxic appearing male in no apparent distress. airway intact. Patient is neurovascular intact. Patient is comfortably in the ED without difficulty or dyspnea. Rash noted, see above for details.  See physical exam section for my assessment.  Physical exam concerning for but not limited to hives versus contact dermatitis versus chronic rash.  No evidence of shingles or emergent rash.  due to prior history and current physical exam, I deemed no basic laboratory or radiologic studies were necessary at this time.  I was not concerned for any acute systemic infection, abscess or additional emergent etiology at this time due to his assessment and hemodynamic stability.  Patient was offered Benadryl p.o. and Tylenol p.o for his symptoms with improvement.  Patient remained hemodynamically stable throughout ED visit.  Previous consult/ED visit notes were reviewed. Findings were discussed with patient and patient agreeable for plan to discharge home with primary care provider follow-up in one week for further management.  Patient was educated that they could also use warm compresses at home as needed for additional pain management .  educated on signs and symptoms of infection, no indication for antibiotic at this time. Prior medications reviewed and Patient prescribed Benadryl p.o. and Tylenol p.o. for symptomatic management at home..  Return precautions discussed and patient acknowledged understanding.  All questions and concerns answered prior to discharge.                 This patient was staffed with ED Attending Dr. Pratt to review the plan of care during ED course.            *Please note that portions of this note may have been completed with a voice recognition program.  Efforts were made to edit the dictations but occasionally, words are mis-transcribed.  Procedure  Procedures     Gwendolyn Crespo, CHRISTEN-CNP  08/21/24 1734     (0) Performs both tasks correctly

## 2024-08-22 LAB
ATRIAL RATE: 53 BPM
P AXIS: 56 DEGREES
P OFFSET: 157 MS
P ONSET: 114 MS
PR INTERVAL: 224 MS
Q ONSET: 226 MS
QRS COUNT: 8 BEATS
QRS DURATION: 74 MS
QT INTERVAL: 388 MS
QTC CALCULATION(BAZETT): 364 MS
QTC FREDERICIA: 372 MS
R AXIS: 35 DEGREES
T AXIS: 124 DEGREES
T OFFSET: 420 MS
VENTRICULAR RATE: 53 BPM

## 2024-08-24 ENCOUNTER — HOSPITAL ENCOUNTER (EMERGENCY)
Facility: HOSPITAL | Age: 64
Discharge: HOME | End: 2024-08-25
Attending: EMERGENCY MEDICINE
Payer: MEDICAID

## 2024-08-24 VITALS
TEMPERATURE: 97.2 F | DIASTOLIC BLOOD PRESSURE: 59 MMHG | SYSTOLIC BLOOD PRESSURE: 124 MMHG | BODY MASS INDEX: 23.54 KG/M2 | WEIGHT: 150 LBS | HEIGHT: 67 IN | HEART RATE: 70 BPM | OXYGEN SATURATION: 97 % | RESPIRATION RATE: 18 BRPM

## 2024-08-24 DIAGNOSIS — M25.512 CHRONIC LEFT SHOULDER PAIN: Primary | ICD-10-CM

## 2024-08-24 DIAGNOSIS — G89.29 CHRONIC LEFT SHOULDER PAIN: Primary | ICD-10-CM

## 2024-08-24 PROCEDURE — 99283 EMERGENCY DEPT VISIT LOW MDM: CPT

## 2024-08-24 PROCEDURE — 99284 EMERGENCY DEPT VISIT MOD MDM: CPT | Performed by: EMERGENCY MEDICINE

## 2024-08-24 ASSESSMENT — LIFESTYLE VARIABLES
EVER FELT BAD OR GUILTY ABOUT YOUR DRINKING: NO
EVER HAD A DRINK FIRST THING IN THE MORNING TO STEADY YOUR NERVES TO GET RID OF A HANGOVER: NO
HAVE YOU EVER FELT YOU SHOULD CUT DOWN ON YOUR DRINKING: NO
TOTAL SCORE: 0
HAVE PEOPLE ANNOYED YOU BY CRITICIZING YOUR DRINKING: NO

## 2024-08-24 ASSESSMENT — PAIN SCALES - GENERAL: PAINLEVEL_OUTOF10: 9

## 2024-08-24 ASSESSMENT — PAIN - FUNCTIONAL ASSESSMENT: PAIN_FUNCTIONAL_ASSESSMENT: 0-10

## 2024-08-25 ENCOUNTER — APPOINTMENT (OUTPATIENT)
Dept: RADIOLOGY | Facility: HOSPITAL | Age: 64
End: 2024-08-25
Payer: MEDICAID

## 2024-08-25 PROCEDURE — 73030 X-RAY EXAM OF SHOULDER: CPT | Mod: LT

## 2024-08-25 PROCEDURE — 73030 X-RAY EXAM OF SHOULDER: CPT | Mod: LEFT SIDE | Performed by: RADIOLOGY

## 2024-08-25 RX ORDER — ACETAMINOPHEN 325 MG/1
650 TABLET ORAL ONCE
Status: COMPLETED | OUTPATIENT
Start: 2024-08-25 | End: 2024-08-25

## 2024-08-25 NOTE — DISCHARGE INSTRUCTIONS
You have an appointment with pain management and internal medicine 8/26. It will be important to go to these to address this chronic shoulder pain.      Your shoulder xray showed No acute fracture or malalignment of the left shoulder.

## 2024-08-25 NOTE — ED PROVIDER NOTES
Emergency Department Provider Note        History of Present Illness     History provided by: Patient  Limitations to History: None  External Records Reviewed with Brief Summary: Discharge Summary from  which showed had been admitted for left upper chest/shoulder pain, suspected tendinitis.  Patient had a normal stress test during this hospitalization with an LVEF of 62%    HPI:  Link Macias is a 63 y.o. male past medical history of bilateral AKA, peripheral artery disease, type 2 diabetes, hypertension, hyperlipidemia presenting for left shoulder pain after fall.  Patient states that he fell out of his chair while he was reaching for something above him.  Patient denies head strike, loss of consciousness.  He denies being on a blood thinner.  He denies numbness or tingling down the arm.     Physical Exam   Triage vitals:  T 36.2 °C (97.2 °F)  HR 70  /59  RR 18  O2 97 %      General: Awake, alert, in no acute distress  Eyes: Gaze conjugate.  No scleral icterus or injection  HENT: Normo-cephalic, atraumatic. No stridor.  No midline C-spine tenderness.  CV: Regular rate, regular rhythm. Radial pulses 2+ bilaterally  Resp: Breathing non-labored, speaking in full sentences.  Clear to auscultation bilaterally  MSK/Extremities: Bilateral AKA's.  Tenderness to palpation along the left anterior shoulder.  No pain when I range the shoulder.  No sensory deficit.  Skin: Warm. Appropriate color  Neuro: Alert. Oriented. Face symmetric. Speech is fluent.  Equal strength in bilateral upper extremities.  Psych: Appropriate mood and affect    Medical Decision Making & ED Course   Medical Decision Makin y.o. male past medical history of bilateral AKA's, peripheral artery disease, type 2 diabetes, hypertension, hyperlipidemia with multiple visits for left shoulder pain and a recent admission for cardiac workup which was unremarkable presenting again for left shoulder pain.  This time however patient states that  he fell out of his wheelchair.  He does have similar anterior shoulder pain on the left side which is documented on previous encounters.  However given that this time he reports trauma to the area, will obtain an x-ray.  Will not obtain cardiac workup at this time as he is had a reassuring nuclear stress test recently, is not complaining of chest pain or shortness of breath.  He did not report head strike, loss of consciousness or midline C-spine tenderness so will defer CT imaging at this time.  If x-ray is negative, will send a referral for orthopedics as this was given to him on his last discharge for concern of left rotator cuff tendinopathy.  ----         Social Determinants of Health which Significantly Impact Care: None identified     EKG Independent Interpretation: EKG not obtained    Independent Result Review and Interpretation: Relevant laboratory and radiographic results were reviewed and independently interpreted by myself.  As necessary, they are commented on in the ED Course.    Chronic conditions affecting the patient's care: As documented above in [facility]    The patient was discussed with the following consultants/services: None    Care Considerations: As documented above in [facility]    ED Course:  Diagnoses as of 08/25/24 0151   Chronic left shoulder pain     Disposition   Patient was signed out to Dr. Marcelino at 2300 pending completion of their work-up.  Please see the next provider's transition of care note for the remainder of the patient's care.     Procedures   Procedures    Patient seen and discussed with ED attending physician.    Carey Weiner DO  Emergency Medicine       Carey Weiner DO  Resident  08/25/24 0152

## 2024-08-25 NOTE — ED TRIAGE NOTES
PT to the ED for right shoulder pain after falling off his chair. Pt denies any head trauma or LOC.

## 2024-08-25 NOTE — PROGRESS NOTES
"Emergency Medicine Transition of Care Note.    I received Link Macias in signout from Dr. Weiner.  Please see the previous ED provider note for all HPI, PE and MDM up to the time of signout at 0200. This is in addition to the primary record.    In brief Link Macias is an 63 y.o. male presenting for   Chief Complaint   Patient presents with    Shoulder Pain     At the time of signout we were awaiting: Shoulder Xray results    Diagnoses as of 08/25/24 0310   Chronic left shoulder pain       Medical Decision Making  63-year-old male with a past medical history of bilateral AKA, peripheral artery disease, hypertension, hyperlipidemia who came in for evaluation for left shoulder pain after a fall.  At the time of signout we were pending the patient's x-ray results which showed no concern for fracture or dislocation.  Patient was given Tylenol by the previous provider did not require any additional medications under my care.  Patient was discharged home in stable condition with return precautions and follow-up instructions provided.    Final diagnoses:   [M25.512, G89.29] Chronic left shoulder pain     Joanie Swenson DO     Discharge    Joanie \"Giancarlo\" DO Leela  [facility]  Emergency Medicine PGY-2  "

## 2024-08-26 ENCOUNTER — APPOINTMENT (OUTPATIENT)
Dept: PAIN MEDICINE | Facility: CLINIC | Age: 64
End: 2024-08-26
Payer: MEDICAID

## 2024-08-26 DIAGNOSIS — G54.6 PHANTOM LIMB PAIN (MULTI): ICD-10-CM

## 2024-08-26 PROCEDURE — 4010F ACE/ARB THERAPY RXD/TAKEN: CPT | Performed by: PAIN MEDICINE

## 2024-08-26 PROCEDURE — 99204 OFFICE O/P NEW MOD 45 MIN: CPT | Performed by: PAIN MEDICINE

## 2024-08-26 SDOH — SOCIAL STABILITY: SOCIAL NETWORK: SOCIAL ACTIVITY:: 7

## 2024-08-26 ASSESSMENT — PAIN SCALES - GENERAL: PAINLEVEL_OUTOF10: 6

## 2024-08-26 ASSESSMENT — PAIN - FUNCTIONAL ASSESSMENT: PAIN_FUNCTIONAL_ASSESSMENT: 0-10

## 2024-08-26 NOTE — PROGRESS NOTES
Subjective   Patient ID: Link Macias is a 63 y.o. male with a past medical history of bilateral AKA, PAD, type 2 diabetes, hypertension, hyperlipidemia, who presents to us for evaluation of left shoulder pain that began after a fall from bed to floor.  Patient reports that he had been to the emergency medicine in the past for the shoulder pain, and cardiac causes were ruled out.  Also states that fractures and dislocations were ruled out.  However he has not attempted any medication, ice, heat, or physical therapy for his joint pain.  He is able to move the arm in all directions, although with some pain above 90 degrees.    Review of Systems   13-point ROS done and negative except for HPI.     Current Outpatient Medications   Medication Instructions    acetaminophen (TYLENOL) 650 mg, oral, Every 6 hours PRN    aspirin 81 mg, oral, Daily    atorvastatin (LIPITOR) 80 mg, oral, Nightly    cholecalciferol (VITAMIN D-3) 25 mcg, oral, Daily    diphenhydrAMINE (BENADRYL) 25 mg, oral, Every 8 hours PRN    docusate sodium (Colace) 100 mg capsule 1 capsule, oral, 2 times daily PRN    DULoxetine (CYMBALTA) 60 mg, oral, Daily    empagliflozin (JARDIANCE) 25 mg, oral, Daily with breakfast    famotidine (PEPCID) 20 mg, oral, Daily    hydrocortisone (Anusol-HC) 2.5 % rectal cream Insert 1 Application into the rectum 2 times a day as needed for hemorrhoids.    hydrOXYzine HCL (ATARAX) 25 mg, oral, Daily PRN    losartan (COZAAR) 100 mg, oral, Daily    melatonin 3 mg, oral, Nightly    metFORMIN (GLUCOPHAGE) 500 mg, oral, 2 times daily (morning and late afternoon)    metoprolol tartrate (LOPRESSOR) 25 mg, oral, 2 times daily    naproxen (NAPROSYN) 375 mg, oral, Every 12 hours PRN    nitroglycerin (NITROSTAT) 0.4 mg, sublingual, Every 5 min PRN, Up to 3 times. IF NO RELIEF CALL 911     omega-3 acid ethyl esters (Lovaza) 1 gram capsule 2 capsules, oral, Daily    ondansetron (ZOFRAN) 4 mg, oral, Every 8 hours PRN    OXcarbazepine  (TRILEPTAL) 150 mg, oral, 2 times daily    pregabalin (Lyrica) 75 mg capsule Take 1 capsule (75 mg) by mouth 2 times a day.       Past Medical History:   Diagnosis Date    Anxiety     Diabetes mellitus (Multi)     HLD (hyperlipidemia)     Hypertension         Past Surgical History:   Procedure Laterality Date    CT ABDOMEN PELVIS ANGIOGRAM W AND/OR WO IV CONTRAST  12/16/2022    CT ABDOMEN PELVIS ANGIOGRAM W AND/OR WO IV CONTRAST 12/16/2022 [facility]    CT ABDOMEN PELVIS ANGIOGRAM W AND/OR WO IV CONTRAST  8/18/2023    CT ABDOMEN PELVIS ANGIOGRAM W AND/OR WO IV CONTRAST 8/18/2023 [facility] [place]    CT ABDOMEN PELVIS ANGIOGRAM W AND/OR WO IV CONTRAST  9/3/2023    CT ABDOMEN PELVIS ANGIOGRAM W AND/OR WO IV CONTRAST 9/3/2023 [facility] [place]        No family history on file.     Allergies   Allergen Reactions    Penicillins Unknown        Objective     There were no vitals filed for this visit.     Physical Exam  General: NAD, well groomed, well nourished  Eyes: Non-icteric sclera, EOMI  Ears, Nose, Mouth, and Throat: External ears and nose appear to be without deformity or rash. No lesions or masses noted. Hearing is grossly intact.   Neck: Trachea midline  Respiratory: Nonlabored breathing   Cardiovascular: RRR   skin: Rashes and open lesions/ulcers identified on skin.    Shoulder:  -Tenderness to palpation over the biceps tendon and the AC joint.  - Pain with external rotation and flexion of the glenohumeral joint resistance.     Neurologic:   Cranial nerves grossly intact.   Strength 5/5 and symmetric plantar/dorsiflexion   Sensation: Normal to light touch throughout, pinprick intact throughout.      Psychiatric: Alert, orientation to person, place, and time. Cooperative.    Imaging personally reviewed and independently interpreted: X-ray left shoulder 8/25/2024:  IMPRESSION:  No acute fracture or malalignment of the left shoulder.    Assessment/Plan   Mr. Link Macias is a 63-year-old male with a history of bilateral  AKA, PAD, type 2 diabetes, hypertension, hyperlipidemia, who presents to us for evaluation of left shoulder pain that began after a fall from bed to floor.  Review of x-ray of the joint was negative for acute fractures or dislocation.  Physical exam was significant for tenderness to palpation over the biceps tendon and the AC joint, as well as pain with external rotation of the glenohumeral joint.  Patient has not had any medical or physical therapy.    Plan:  -Referral to physical therapy for biceps tendinitis and rotator cuff dysfunction given to the patient.  Patient to follow-up with us after completion of physical therapy.  - Patient can use over-the-counter NSAIDs for pain.  Creatinine and GFR are WNL on review of labs.  He can also use ice or heat packs to the joint as needed.    Follow up: After consult with physical therapy    The patient was invited to contact us back anytime with any questions or concerns and follow-up with us in the office as needed.     Diagnoses and all orders for this visit:  Phantom limb pain (Multi)  -     Referral to Pain Medicine  -     Referral to Physical Therapy; Future      This note was generated with the aid of dictation software, there may be typos despite my attempts at proofreading.     Joan Storm MD  Chronic Pain Medicine Fellow  [facility]

## 2024-08-28 PROCEDURE — 99285 EMERGENCY DEPT VISIT HI MDM: CPT | Performed by: EMERGENCY MEDICINE

## 2024-08-28 PROCEDURE — 99284 EMERGENCY DEPT VISIT MOD MDM: CPT

## 2024-08-28 ASSESSMENT — PAIN DESCRIPTION - ORIENTATION: ORIENTATION: LEFT

## 2024-08-28 ASSESSMENT — PAIN DESCRIPTION - LOCATION: LOCATION: GROIN

## 2024-08-28 ASSESSMENT — PAIN DESCRIPTION - PAIN TYPE: TYPE: ACUTE PAIN

## 2024-08-28 ASSESSMENT — PAIN SCALES - GENERAL: PAINLEVEL_OUTOF10: 9

## 2024-08-28 ASSESSMENT — PAIN DESCRIPTION - DESCRIPTORS: DESCRIPTORS: BURNING

## 2024-08-28 ASSESSMENT — PAIN - FUNCTIONAL ASSESSMENT: PAIN_FUNCTIONAL_ASSESSMENT: 0-10

## 2024-08-29 ENCOUNTER — APPOINTMENT (OUTPATIENT)
Dept: RADIOLOGY | Facility: HOSPITAL | Age: 64
End: 2024-08-29
Payer: MEDICAID

## 2024-08-29 ENCOUNTER — HOSPITAL ENCOUNTER (EMERGENCY)
Facility: HOSPITAL | Age: 64
Discharge: HOME | End: 2024-08-30
Attending: EMERGENCY MEDICINE
Payer: MEDICAID

## 2024-08-29 DIAGNOSIS — L03.818 CELLULITIS OF OTHER SPECIFIED SITE: Primary | ICD-10-CM

## 2024-08-29 LAB
ALBUMIN SERPL BCP-MCNC: 4.7 G/DL (ref 3.4–5)
ALP SERPL-CCNC: 114 U/L (ref 33–136)
ALT SERPL W P-5'-P-CCNC: 11 U/L (ref 10–52)
ANION GAP BLDV CALCULATED.4IONS-SCNC: 10 MMOL/L (ref 10–25)
ANION GAP SERPL CALC-SCNC: 17 MMOL/L (ref 10–20)
APPEARANCE UR: CLEAR
AST SERPL W P-5'-P-CCNC: 18 U/L (ref 9–39)
BASE EXCESS BLDV CALC-SCNC: 3.4 MMOL/L (ref -2–3)
BASOPHILS # BLD AUTO: 0.01 X10*3/UL (ref 0–0.1)
BASOPHILS NFR BLD AUTO: 0.2 %
BILIRUB SERPL-MCNC: 0.4 MG/DL (ref 0–1.2)
BILIRUB UR STRIP.AUTO-MCNC: NEGATIVE MG/DL
BODY TEMPERATURE: 37 DEGREES CELSIUS
BUN SERPL-MCNC: 12 MG/DL (ref 6–23)
CA-I BLDV-SCNC: 1.22 MMOL/L (ref 1.1–1.33)
CALCIUM SERPL-MCNC: 10.3 MG/DL (ref 8.6–10.6)
CHLORIDE BLDV-SCNC: 99 MMOL/L (ref 98–107)
CHLORIDE SERPL-SCNC: 99 MMOL/L (ref 98–107)
CO2 SERPL-SCNC: 27 MMOL/L (ref 21–32)
COLOR UR: ABNORMAL
CREAT SERPL-MCNC: 0.7 MG/DL (ref 0.5–1.3)
EGFRCR SERPLBLD CKD-EPI 2021: >90 ML/MIN/1.73M*2
EOSINOPHIL # BLD AUTO: 0.06 X10*3/UL (ref 0–0.7)
EOSINOPHIL NFR BLD AUTO: 1.5 %
ERYTHROCYTE [DISTWIDTH] IN BLOOD BY AUTOMATED COUNT: 14.7 % (ref 11.5–14.5)
GLUCOSE BLDV-MCNC: 105 MG/DL (ref 74–99)
GLUCOSE SERPL-MCNC: 94 MG/DL (ref 74–99)
GLUCOSE UR STRIP.AUTO-MCNC: ABNORMAL MG/DL
HCO3 BLDV-SCNC: 30.1 MMOL/L (ref 22–26)
HCT VFR BLD AUTO: 47.6 % (ref 41–52)
HCT VFR BLD EST: 48 % (ref 41–52)
HGB BLD-MCNC: 15.4 G/DL (ref 13.5–17.5)
HGB BLDV-MCNC: 16 G/DL (ref 13.5–17.5)
IMM GRANULOCYTES # BLD AUTO: 0.01 X10*3/UL (ref 0–0.7)
IMM GRANULOCYTES NFR BLD AUTO: 0.2 % (ref 0–0.9)
INHALED O2 CONCENTRATION: 21 %
KETONES UR STRIP.AUTO-MCNC: NEGATIVE MG/DL
LACTATE BLDV-SCNC: 1.6 MMOL/L (ref 0.4–2)
LEUKOCYTE ESTERASE UR QL STRIP.AUTO: NEGATIVE
LYMPHOCYTES # BLD AUTO: 1.41 X10*3/UL (ref 1.2–4.8)
LYMPHOCYTES NFR BLD AUTO: 35.2 %
MCH RBC QN AUTO: 25 PG (ref 26–34)
MCHC RBC AUTO-ENTMCNC: 32.4 G/DL (ref 32–36)
MCV RBC AUTO: 77 FL (ref 80–100)
MONOCYTES # BLD AUTO: 0.46 X10*3/UL (ref 0.1–1)
MONOCYTES NFR BLD AUTO: 11.5 %
NEUTROPHILS # BLD AUTO: 2.06 X10*3/UL (ref 1.2–7.7)
NEUTROPHILS NFR BLD AUTO: 51.4 %
NITRITE UR QL STRIP.AUTO: NEGATIVE
NRBC BLD-RTO: 0 /100 WBCS (ref 0–0)
OXYHGB MFR BLDV: 38.7 % (ref 45–75)
PCO2 BLDV: 52 MM HG (ref 41–51)
PH BLDV: 7.37 PH (ref 7.33–7.43)
PH UR STRIP.AUTO: 5.5 [PH]
PLATELET # BLD AUTO: 210 X10*3/UL (ref 150–450)
PO2 BLDV: 29 MM HG (ref 35–45)
POTASSIUM BLDV-SCNC: 4.5 MMOL/L (ref 3.5–5.3)
POTASSIUM SERPL-SCNC: 4.4 MMOL/L (ref 3.5–5.3)
PROT SERPL-MCNC: 8 G/DL (ref 6.4–8.2)
PROT UR STRIP.AUTO-MCNC: ABNORMAL MG/DL
RBC # BLD AUTO: 6.15 X10*6/UL (ref 4.5–5.9)
RBC # UR STRIP.AUTO: NEGATIVE /UL
RBC #/AREA URNS AUTO: >20 /HPF
SAO2 % BLDV: 39 % (ref 45–75)
SODIUM BLDV-SCNC: 135 MMOL/L (ref 136–145)
SODIUM SERPL-SCNC: 139 MMOL/L (ref 136–145)
SP GR UR STRIP.AUTO: 1.02
UROBILINOGEN UR STRIP.AUTO-MCNC: NORMAL MG/DL
WBC # BLD AUTO: 4 X10*3/UL (ref 4.4–11.3)
WBC #/AREA URNS AUTO: ABNORMAL /HPF
YEAST BUDDING #/AREA UR COMP ASSIST: PRESENT /HPF

## 2024-08-29 PROCEDURE — 96375 TX/PRO/DX INJ NEW DRUG ADDON: CPT

## 2024-08-29 PROCEDURE — 2500000004 HC RX 250 GENERAL PHARMACY W/ HCPCS (ALT 636 FOR OP/ED): Mod: SE

## 2024-08-29 PROCEDURE — 74177 CT ABD & PELVIS W/CONTRAST: CPT

## 2024-08-29 PROCEDURE — 81001 URINALYSIS AUTO W/SCOPE: CPT

## 2024-08-29 PROCEDURE — 96374 THER/PROPH/DIAG INJ IV PUSH: CPT | Mod: 59

## 2024-08-29 PROCEDURE — 82435 ASSAY OF BLOOD CHLORIDE: CPT

## 2024-08-29 PROCEDURE — 36415 COLL VENOUS BLD VENIPUNCTURE: CPT

## 2024-08-29 PROCEDURE — 74177 CT ABD & PELVIS W/CONTRAST: CPT | Performed by: RADIOLOGY

## 2024-08-29 PROCEDURE — 80053 COMPREHEN METABOLIC PANEL: CPT

## 2024-08-29 PROCEDURE — 96361 HYDRATE IV INFUSION ADD-ON: CPT

## 2024-08-29 PROCEDURE — 85025 COMPLETE CBC W/AUTO DIFF WBC: CPT

## 2024-08-29 PROCEDURE — 2550000001 HC RX 255 CONTRASTS: Mod: SE

## 2024-08-29 PROCEDURE — 2500000002 HC RX 250 W HCPCS SELF ADMINISTERED DRUGS (ALT 637 FOR MEDICARE OP, ALT 636 FOR OP/ED): Mod: SE

## 2024-08-29 RX ORDER — KETOROLAC TROMETHAMINE 15 MG/ML
15 INJECTION, SOLUTION INTRAMUSCULAR; INTRAVENOUS ONCE
Status: COMPLETED | OUTPATIENT
Start: 2024-08-29 | End: 2024-08-29

## 2024-08-29 RX ORDER — ONDANSETRON HYDROCHLORIDE 2 MG/ML
4 INJECTION, SOLUTION INTRAVENOUS ONCE
Status: COMPLETED | OUTPATIENT
Start: 2024-08-29 | End: 2024-08-29

## 2024-08-29 RX ORDER — SULFAMETHOXAZOLE AND TRIMETHOPRIM 800; 160 MG/1; MG/1
1 TABLET ORAL EVERY 12 HOURS
Qty: 10 TABLET | Refills: 0 | Status: SHIPPED | OUTPATIENT
Start: 2024-08-29 | End: 2024-09-03

## 2024-08-29 RX ORDER — SULFAMETHOXAZOLE AND TRIMETHOPRIM 800; 160 MG/1; MG/1
1 TABLET ORAL ONCE
Status: COMPLETED | OUTPATIENT
Start: 2024-08-29 | End: 2024-08-29

## 2024-08-29 ASSESSMENT — PAIN SCALES - GENERAL: PAINLEVEL_OUTOF10: 0 - NO PAIN

## 2024-08-29 ASSESSMENT — LIFESTYLE VARIABLES
HAVE YOU EVER FELT YOU SHOULD CUT DOWN ON YOUR DRINKING: NO
EVER FELT BAD OR GUILTY ABOUT YOUR DRINKING: NO
HAVE PEOPLE ANNOYED YOU BY CRITICIZING YOUR DRINKING: NO
EVER HAD A DRINK FIRST THING IN THE MORNING TO STEADY YOUR NERVES TO GET RID OF A HANGOVER: NO
TOTAL SCORE: 0

## 2024-08-29 ASSESSMENT — PAIN - FUNCTIONAL ASSESSMENT: PAIN_FUNCTIONAL_ASSESSMENT: 0-10

## 2024-08-29 ASSESSMENT — PAIN DESCRIPTION - PROGRESSION: CLINICAL_PROGRESSION: RESOLVED

## 2024-08-29 NOTE — ED PROVIDER NOTES
Emergency Department Provider Note        History of Present Illness     History provided by: Patient  Limitations to History: None  External Records Reviewed with Brief Summary:  Previous ED provider note from 8/24/2022 summarizing health history and reason for visit    HPI:  Link Macias is a 63 y.o. male past medical history of bilateral AKA, peripheral artery disease, type 2 diabetes, hypertension, hyperlipidemia presenting to the emergency department for groin pain.  Patient states he has had symptoms intermittently for the past week and it has been worsening over the past 3 days.  Patient had been seen previously for concern for a rash and it was suspected that he potentially had a rash secondary to meloxicam use.  Patient states he is now endorsing burning pain in his groin and it is uncomfortable for him to sit.  States he has no difficulty urinating, no pus or urine changes including hematuria or dysuria.  Denies fever, chills but does endorse myalgias.  No chest pain or shortness of breath, nausea, vomiting or diarrhea    Physical Exam   Triage vitals:  T 36.2 °C (97.2 °F)  HR 57  /74  RR 16  O2 98 % None (Room air)    General: Awake, alert, in no acute distress  Eyes: Gaze conjugate.  No scleral icterus or injection  HENT: Normo-cephalic, atraumatic. No stridor  CV: Regular rate, regular rhythm. Radial pulses 2+ bilaterally  Resp: Breathing non-labored, speaking in full sentences.  Clear to auscultation bilaterally  GI: Soft, non-distended, non-tender. No rebound or guarding.  : Chaperone present. Cellulitis changes of the right inguinal region with concern for yeast infection of the glans penis as there are surrounding satellite lesions.  No palpable crepitus.  Tenderness to palpation.  No fluctuance or induration appreciated.  No obvious vesicular lesions consistent with HSV  MSK/Extremities: No gross bony deformities. Moving all extremities  Skin: Warm. Appropriate color  Neuro: Alert.  Oriented. Face symmetric. Speech is fluent.  Gross strength and sensation intact in b/l UE and LEs  Psych: Appropriate mood and affect    Medical Decision Making & ED Course   Medical Decision Makin y.o. male presenting to the emergency department with concern for groin pain.  On physical exam, patient is hemodynamically stable and in no acute distress, is well-appearing and at his baseline.  Patient does have some local erythema warmth and swelling concerning for cellulitis of the right groin.  Does have some sensitivity to light touch.  No lymphatic spread visible, no fluid pockets or fluctuance, no concerning signs for abscess.  Given patient's medical history and location, CT of the abdomen pelvis with contrast ordered and no signs of necrotizing fasciitis or deep space abscess or infection.  Patient cannot take Keflex secondary to a penicillin allergy and was started on Bactrim here.  Patient will be discharged home with a prescription for Bactrim and encouraged to follow-up with his primary care outpatient.  ----       Social Determinants of Health which Significantly Impact Care: None identified     EKG Independent Interpretation: EKG not obtained    Independent Result Review and Interpretation: Relevant laboratory and radiographic results were reviewed and independently interpreted by myself.  As necessary, they are commented on in the ED Course.    Chronic conditions affecting the patient's care: As documented above in [facility]    The patient was discussed with the following consultants/services:  none    Care Considerations: As documented above in [facility]    ED Course:  Diagnoses as of 24   Cellulitis of other specified site     Disposition   As a result of the work-up, the patient was discharged home.  he was informed of his diagnosis and instructed to come back with any concerns or worsening of condition.  he and was agreeable to the plan as discussed above.  he was given the opportunity to ask  questions.  All of the patient's questions were answered.    Procedures   Procedures    Patient seen and discussed with ED attending physician.    Marisela Vargas DO  Emergency Medicine       Marisela Vargas DO  Resident  08/29/24 1949

## 2024-08-30 VITALS
OXYGEN SATURATION: 98 % | SYSTOLIC BLOOD PRESSURE: 104 MMHG | DIASTOLIC BLOOD PRESSURE: 62 MMHG | HEART RATE: 59 BPM | WEIGHT: 150 LBS | TEMPERATURE: 97.8 F | BODY MASS INDEX: 23.49 KG/M2 | RESPIRATION RATE: 18 BRPM

## 2024-08-30 LAB — HOLD SPECIMEN: NORMAL

## 2024-09-02 ENCOUNTER — HOSPITAL ENCOUNTER (EMERGENCY)
Facility: HOSPITAL | Age: 64
Discharge: HOME | End: 2024-09-03
Payer: MEDICAID

## 2024-09-02 VITALS
RESPIRATION RATE: 18 BRPM | DIASTOLIC BLOOD PRESSURE: 66 MMHG | SYSTOLIC BLOOD PRESSURE: 116 MMHG | BODY MASS INDEX: 23.49 KG/M2 | HEART RATE: 86 BPM | WEIGHT: 150 LBS | OXYGEN SATURATION: 98 % | TEMPERATURE: 97.5 F

## 2024-09-02 DIAGNOSIS — Z89.9 CHRONIC PAIN AFTER AMPUTATION (HHS-HCC): Primary | ICD-10-CM

## 2024-09-02 DIAGNOSIS — G89.21 CHRONIC PAIN AFTER AMPUTATION (HHS-HCC): Primary | ICD-10-CM

## 2024-09-02 PROCEDURE — 99283 EMERGENCY DEPT VISIT LOW MDM: CPT

## 2024-09-02 PROCEDURE — 99283 EMERGENCY DEPT VISIT LOW MDM: CPT | Performed by: PHYSICIAN ASSISTANT

## 2024-09-02 RX ORDER — ACETAMINOPHEN 325 MG/1
975 TABLET ORAL ONCE
Status: COMPLETED | OUTPATIENT
Start: 2024-09-02 | End: 2024-09-02

## 2024-09-03 NOTE — ED TRIAGE NOTES
REY FALCON is a 63y old M with a PMHx significant of bilateral AKA, peripheral artery disease, type 2 diabetes, hypertension, hyperlipidemia presenting to the emergency department for groin pain.  Patient states he has had symptoms intermittently for the past week and it has been worsening over the past 3 days.  Patient had been seen previously for concern for a rash and it was suspected that he potentially had a rash secondary to meloxicam use.  Patient states he is now endorsing burning pain in his groin and it is uncomfortable for him to sit.  States he has no difficulty urinating, no pus or urine changes including hematuria or dysuria.  Denies fever, chills but does endorse myalgias.  No chest pain or shortness of breath, nausea, vomiting or diarrhea. Allergy to PCN

## 2024-09-03 NOTE — ED PROVIDER NOTES
This is a 63-year-old male with past medical history of bilateral AKA's, peripheral arterial disease, diabetes, hypertension, hyperlipidemia who presents to the ED with pain to the right side of his pelvis.  He states that he does have chronic pain to this area and felt his chronic pain was worse today.  He denies any new falls, trauma, or injuries.  He does have that he was seen here recently for a rash and has been taking Bactrim for the rash and the rash has been improving.  He denies any fevers or chills.  He denies any nausea or vomiting.  He denies any abdominal pain.  He denies any flank pain, back pain, or other complaints at this time.  He is wheelchair-bound secondary to his bilateral AKA's.      History provided by:  Patient and medical records   used: No             Visit Vitals  /66 (BP Location: Right arm, Patient Position: Sitting)   Pulse 86   Temp 36.4 °C (97.5 °F) (Temporal)   Resp 18   Wt 68 kg (150 lb)   SpO2 98%   BMI 23.49 kg/m²   Smoking Status Every Day   BSA 1.79 m²          Physical Exam     Physical exam:   General: Vitals noted, no distress. Afebrile.   EENT:  Hearing grossly intact. Normal phonation. MMM. Airway patient. PERRL. EOMI.   Neck: No midline tenderness or paraspinal tenderness. FROM.   Cardiac: Regular, rate, rhythm. Normal S1 and S2.  No murmurs, gallops, rubs.   Pulmonary: Good air exchange. Lungs clear bilaterally. No wheezes, rhonchi, rales. No accessory muscle use.   Abdomen: Soft, nonsurgical. Nontender. No peritoneal signs. Normoactive bowel sounds.   : Small amount of skin breakdown noted around the penis with no associated crepitus.  There is some mild erythema with satellite lesions.  No pain to the scrotum.  Extremities: No peripheral edema.  Tenderness palpation to the right side of the pelvis along the patient's right AKA stump.  No erythema or excess warmth.  No crepitus to the area.  No obvious deformity to the skin.  Skin: No rash.  Warm and Dry.   Neuro: No focal neurologic deficits. CN 2-12 grossly intact. Sensation equal bilaterally. No weakness.         Labs Reviewed - No data to display    No orders to display           ED Course & MDM     Medical Decision Making  This is a 63-year-old male with past medical history of bilateral AKA's, peripheral artery disease, diabetes, hypertension, hyperlipidemia who presents to the ED for his chronic right sided pelvis pain.  He states the pain got worse today which is why he came into the ED.  He has been seen in the ED multiple times for the same pain.  Of note patient was also recently seen here for rash to his genitals and was prescribed Bactrim.  Rash has been improving.  On examination patient did have a small mount of skin breakdown to his penis with satellite lesions consistent with yeast infection.  No crepitus.  No soft tissue swelling.  No abnormalities noted to the scrotum.  Patient medicated with Tylenol for his pain.  He is instructed to continue taking the Bactrim as prescribed.  Low suspicion for necrotizing infection at this time based on patient's normal vitals as well as a stool examination and description of his rash improving with the Bactrim.  On examination of the right side of the patient's pelvis he did have some minimal tenderness palpation at the site of his right AKA.  No open wounds.  No crepitus to the skin.  No excess warmth or erythema.  Patient was ordered Tylenol for his pain.  Based on the chronic nature of patient's pain and on his examination today no further intervention performed at this time.  Patient discussed with the attending physician who was agreeable to the plan.  Patient was given Tylenol as well as food and transport from the ED back to the patient's home was arranged.  He was advised about with his primary care provider.  He was agreeable to this plan.     Amount and/or Complexity of Data Reviewed  External Data Reviewed: labs, radiology and  notes.    Risk  OTC drugs.         Diagnoses as of 09/02/24 2236   Chronic pain after amputation ([facility]-HCC)       This was a shared visit with an ED attending.  The patient was seen and discussed with the ED attending    Procedures    WENDY Oneil, ROYA        ATTENDING ATTESTATION  63-year-old male with complex medical history as mentioned above notable for bilateral AKA's with extensive peripheral arterial disease presenting to the emergency department with acute on chronic pain to his groin and hips.  Patient appears to have some dry skin around the base of the groin but otherwise penis is normal as are testicles, the bilateral AKA's appear normal no erosion no evidence of dehiscence of wound, no surrounding erythema no induration nothing to suggest infection or cellulitis.  Patient stated he was having some buttock discomfort, lateral over the right posterior hip and gluteal fold, sensitive exam was performed normal rectal verge no rectal bleeding no sign of abscess.  Patient was given Tylenol for his discomfort he is being actively managed outpatient he is safe for discharge monitor for symptoms return with concerns and continue with medical care as prescribed    EKG reviewed independently by me and agree with interpretation above.    Chau Zee, [facility]    The patient was seen by the resident/fellow.  I have personally performed a substantive portion of the encounter.  I have seen and examined the patient; agree with the workup, evaluation, MDM, management and diagnosis.    I have reviewed all the nurses' notes and have confirmed their findings, and have incorporated those findings into this medical record.   The care plan has been discussed with the resident/fellow; I have reviewed the resident/fellow’s note and agree with the documented findings with the exception/addition of information listed above.  On my own examination I  agree and incorporated in this document my own history, examination findings and clinical decision making.  All notation in this Addendum supersedes information presented by the resident or ALBERTO as listed above.        Carey Menendez PA-C  09/02/24 [id number]

## 2024-09-09 PROCEDURE — 99283 EMERGENCY DEPT VISIT LOW MDM: CPT

## 2024-09-09 PROCEDURE — 99284 EMERGENCY DEPT VISIT MOD MDM: CPT | Performed by: EMERGENCY MEDICINE

## 2024-09-10 ENCOUNTER — HOSPITAL ENCOUNTER (EMERGENCY)
Facility: HOSPITAL | Age: 64
Discharge: HOME | End: 2024-09-10
Attending: EMERGENCY MEDICINE
Payer: MEDICAID

## 2024-09-10 VITALS
BODY MASS INDEX: 23.54 KG/M2 | WEIGHT: 150 LBS | TEMPERATURE: 97.3 F | HEART RATE: 52 BPM | DIASTOLIC BLOOD PRESSURE: 76 MMHG | HEIGHT: 67 IN | SYSTOLIC BLOOD PRESSURE: 155 MMHG | OXYGEN SATURATION: 100 % | RESPIRATION RATE: 18 BRPM

## 2024-09-10 DIAGNOSIS — B37.42 CANDIDAL BALANITIS: Primary | ICD-10-CM

## 2024-09-10 LAB
APPEARANCE UR: CLEAR
BILIRUB UR STRIP.AUTO-MCNC: NEGATIVE MG/DL
COLOR UR: ABNORMAL
GLUCOSE BLD MANUAL STRIP-MCNC: 96 MG/DL (ref 74–99)
GLUCOSE UR STRIP.AUTO-MCNC: ABNORMAL MG/DL
KETONES UR STRIP.AUTO-MCNC: NEGATIVE MG/DL
LEUKOCYTE ESTERASE UR QL STRIP.AUTO: NEGATIVE
MUCOUS THREADS #/AREA URNS AUTO: NORMAL /LPF
NITRITE UR QL STRIP.AUTO: NEGATIVE
PH UR STRIP.AUTO: 5.5 [PH]
PROT UR STRIP.AUTO-MCNC: ABNORMAL MG/DL
RBC # UR STRIP.AUTO: NEGATIVE /UL
RBC #/AREA URNS AUTO: NORMAL /HPF
SP GR UR STRIP.AUTO: 1.02
UROBILINOGEN UR STRIP.AUTO-MCNC: NORMAL MG/DL
WBC #/AREA URNS AUTO: NORMAL /HPF

## 2024-09-10 PROCEDURE — 81003 URINALYSIS AUTO W/O SCOPE: CPT

## 2024-09-10 PROCEDURE — 82947 ASSAY GLUCOSE BLOOD QUANT: CPT

## 2024-09-10 RX ORDER — CLOTRIMAZOLE 1 %
1 CREAM (GRAM) TOPICAL 2 TIMES DAILY
Qty: 60 G | Refills: 0 | Status: SHIPPED | OUTPATIENT
Start: 2024-09-10 | End: 2024-09-16 | Stop reason: HOSPADM

## 2024-09-10 ASSESSMENT — LIFESTYLE VARIABLES
EVER FELT BAD OR GUILTY ABOUT YOUR DRINKING: NO
HAVE PEOPLE ANNOYED YOU BY CRITICIZING YOUR DRINKING: NO
TOTAL SCORE: 0
EVER HAD A DRINK FIRST THING IN THE MORNING TO STEADY YOUR NERVES TO GET RID OF A HANGOVER: NO
HAVE YOU EVER FELT YOU SHOULD CUT DOWN ON YOUR DRINKING: NO

## 2024-09-10 ASSESSMENT — PAIN SCALES - GENERAL: PAINLEVEL_OUTOF10: 0 - NO PAIN

## 2024-09-10 ASSESSMENT — PAIN - FUNCTIONAL ASSESSMENT: PAIN_FUNCTIONAL_ASSESSMENT: 0-10

## 2024-09-10 NOTE — ED TRIAGE NOTES
"Pt states \"I have had this groin pain for a long time. It itches too and I scratch it which causes the pain.\"   "

## 2024-09-10 NOTE — ED NOTES
Discharge paperwork gone over with pt. Education provided and prescriptions (if applicable) were given to pt. Pt IV removed and there is no bleeding at the site of removal. Pt was ambulatory out of ED independently. Pt has no further questions or concerns at this time. Treatment complete.     [facility] arrived to take pt home. Belongings with pt in cot.      Jordi Martinez RN  09/10/24 [id number]

## 2024-09-10 NOTE — ED PROVIDER NOTES
Emergency Department Provider Note        History of Present Illness     History provided by: Patient  Limitations to History: None  External Records Reviewed with Brief Summary:  Multiple ED discharge summaries showing significant healthcare contact.  Of note the patient was seen in the ED on 8/29 for similar complaint following which he was diagnosed with cellulitis and started on a course of oral Bactrim.  Examination at that time was reportedly consistent with yeast infection and cellulitis in the groin region and genitals.    HPI:  Link Macias is a 63 y.o. male with bilateral ATK amputations, history of peripheral vascular disease, hypertension, diabetes, hyperlipidemia.  Patient reports severe itching and burning pain in his groin.  He also reports some frequent urination without dysuria.  Denies N/V/D.  Denies chest pain, shortness of breath   Physical Exam   Triage vitals:  T 36.2 °C (97.2 °F)  HR 64  /53  RR 18  O2 99 % None (Room air)    General: Awake, alert, in no acute distress  Eyes: Gaze conjugate.  No scleral icterus or injection  HENT: Normo-cephalic, atraumatic. No stridor  Resp: Breathing non-labored, speaking in full sentences.   GI: Soft, non-distended, non-tender. No rebound or guarding.  : Normal development of penis and scrotum, circumcised.  Penis and scrotum appear wet consistent with weeping/seeping skin, no open sores noted, some curd-like exudate. Area of greatest tenderness is on the shaft and head of the penis. Appears to be some swelling of the shaft and head of the penis without swelling of the scrotum which is normal in size. Pain is unchanged when lifting testes.  .  Erythema and friable skin is noted in the left anterior scrotum, appears consistent with scratching.   MSK/Extremities: Bilateral ATK amputations, moving BUEs  Skin: Warm. Appropriate color beyond  region  Neuro: Alert. Oriented. Face symmetric. Speech is fluent.  Gross strength and sensation intact  in b/l UE   Psych: Appropriate mood and affect    Medical Decision Making & ED Course   Medical Decision Makin y.o. male physical examination is consistent with balanitis.  Checked a blood glucose which was within normal limits, additionally checked a UA which was not resulted at time of signout.    Given the patient's history, being a circumcised male with diabetes taking Jardiance, he is most likely suffering from Candida balanitis. Will discharge patient with topical antifungal for treatment of yeast infection/balanitis. Patient was recently treated with Bactrim and examination is not consistent with cellulitis, will not discharge with antibacterial.   ----    Differential diagnoses considered include but are not limited to: Cellulitis is unlikely due to no evidence of skin erythema other than raw skin, which appears secondary to scratching     Social Determinants of Health which Significantly Impact Care: None identified     EKG Independent Interpretation: EKG not obtained    Independent Result Review and Interpretation: Relevant laboratory and radiographic results were reviewed and independently interpreted by myself.  As necessary, they are commented on in the ED Course.    Chronic conditions affecting the patient's care: As documented above in [facility]    The patient was discussed with the following consultants/services: None    Care Considerations: As documented above in [facility]    ED Course:  ED Course as of 09/10/24 1452   Tue Sep 10, 2024   1418 POCT Glucose: 96  Normal, no concern for acute hypo/hyperglycemic issues.  [CJ]      ED Course User Index  [CJ] Leo Strong MD         Diagnoses as of 09/10/24 145   Candidal balanitis     Disposition   Patient was signed out to Dr. Costello at 1500 pending completion of their work-up.  Please see the next provider's transition of care note for the remainder of the patient's care.     Procedures   Procedures    Patient seen and discussed with ED attending  physician.    Leo Strong MD  Emergency Medicine       Leo Strong MD  Resident  09/10/24 1525

## 2024-09-10 NOTE — DISCHARGE INSTRUCTIONS
You were seen today in the ED for groin pain.  You were diagnosed with candidal balanitis, which is a fungal infection.  Please use the prescribed skin cream twice daily on the areas on which you are having discomfort.  You should use the cream for 3 weeks and try to maintain good hygiene to prevent reinfection.  Please return to the ED for any worsening symptoms, including but not limited to increasing groin pain, increasing size of rash, or difficulty urinating.

## 2024-09-10 NOTE — PROGRESS NOTES
Emergency Department Transition of Care Note       Signout   I received Link Macias in signout from Dr. Strong.  Please see the ED Provider Note for all HPI, PE and MDM up to the time of signout at 3 PM.  This is in addition to the primary record.    In brief Link Macias is an 63 y.o. male with history of diabetes presenting for groin pain.  Exam showed weeping skin, erythema, and curd-like exudate, consistent with presentation of Candida balanitis.    At the time of signout we were awaiting:  UA to rule out UTI    ED Course & Medical Decision Making   Medical Decision Making:  Under my care, UA resulted and showed no evidence of UTI.  Patient was prescribed topical antifungal for diagnosis of balanitis.  Discussed with patient how to use the topical antifungal.  Patient was agreeable to discharge.    ED Course:  ED Course as of 09/11/24 1425   Tue Sep 10, 2024   1418 POCT Glucose: 96  Normal, no concern for acute hypo/hyperglycemic issues.  [CJ]      ED Course User Index  [CJ] Leo Strong MD         Diagnoses as of 09/11/24 1425   Candidal balanitis       Disposition   As a result of the work-up, the patient was discharged home.  he was informed of his diagnosis and instructed to come back with any concerns or worsening of condition.  he and was agreeable to the plan as discussed above.  he was given the opportunity to ask questions.  All of the patient's questions were answered.    Procedures   Procedures    This was a shared visit with an ED attending.  The patient was seen and discussed with the ED attending    Diamond Costello DO  Emergency Medicine

## 2024-09-11 ENCOUNTER — OFFICE VISIT (OUTPATIENT)
Dept: DERMATOLOGY | Facility: CLINIC | Age: 64
End: 2024-09-11
Payer: MEDICAID

## 2024-09-11 DIAGNOSIS — L28.1 PRURIGO NODULARIS: Primary | ICD-10-CM

## 2024-09-11 LAB — HOLD SPECIMEN: NORMAL

## 2024-09-11 PROCEDURE — 99204 OFFICE O/P NEW MOD 45 MIN: CPT | Performed by: STUDENT IN AN ORGANIZED HEALTH CARE EDUCATION/TRAINING PROGRAM

## 2024-09-11 PROCEDURE — 4010F ACE/ARB THERAPY RXD/TAKEN: CPT | Performed by: STUDENT IN AN ORGANIZED HEALTH CARE EDUCATION/TRAINING PROGRAM

## 2024-09-11 RX ORDER — TRIAMCINOLONE ACETONIDE 1 MG/G
CREAM TOPICAL 2 TIMES DAILY
Qty: 80 G | Refills: 11 | Status: ON HOLD | OUTPATIENT
Start: 2024-09-11

## 2024-09-11 RX ORDER — HYDROCORTISONE 25 MG/G
CREAM TOPICAL 2 TIMES DAILY
Qty: 30 G | Refills: 11 | Status: ON HOLD | OUTPATIENT
Start: 2024-09-11

## 2024-09-11 RX ORDER — PERMETHRIN 50 MG/G
CREAM TOPICAL ONCE
Qty: 60 G | Refills: 1 | Status: ON HOLD | OUTPATIENT
Start: 2024-09-11 | End: 2024-09-15

## 2024-09-11 NOTE — PROGRESS NOTES
Subjective     Link Macias is a 63 y.o. male who presents for the following: Skin Check (C/o of itching/irritation to the groin, penis, bilateral upper extremity and abdomen. ).     Review of Systems:  No other skin or systemic complaints other than what is documented elsewhere in the note.    The following portions of the chart were reviewed this encounter and updated as appropriate:          Skin Cancer History  No skin cancer on file.      Specialty Problems          Dermatology Problems    Sebaceous cyst    Intertrigo        Objective   Well appearing patient in no apparent distress; mood and affect are within normal limits.    A focused skin examination was performed. All findings within normal limits unless otherwise noted below.    Assessment/Plan   1. Prurigo nodularis  Numerous hyperpigmented scaly papules and nodules on trunk, extremities, genitalia    Favor prurigo nodularis  Will treat one time dose of permethrin for scabies given genitalia involvement  Start triamcinolone 0.1% cream. Patient to apply to affected areas 2x daily x 2 weeks then 1 week off, repeat as needed. Side effects of topical steroids were reviewed including risk of skin atrophy.  For penis, start hydrocortisone 2.5% cream.       permethrin (Elimite) 5 % cream  Apply topically 1 time for 1 dose. Apply from neck down to entire body at night. Wash off in morning. Repeat in 1 week.    triamcinolone (Kenalog) 0.1 % cream  Apply topically 2 times a day. To rash on trunk and extremities. 14 days on, 7 days off. Repeat as needed for rash.    hydrocortisone 2.5 % cream  Apply topically 2 times a day. To rash on genitals. 14 days on, 7 days off. Repeat as needed for rash.

## 2024-09-14 ENCOUNTER — HOSPITAL ENCOUNTER (INPATIENT)
Facility: HOSPITAL | Age: 64
End: 2024-09-14
Attending: EMERGENCY MEDICINE | Admitting: STUDENT IN AN ORGANIZED HEALTH CARE EDUCATION/TRAINING PROGRAM
Payer: MEDICAID

## 2024-09-14 DIAGNOSIS — G54.7 PHANTOM LIMB SYNDROME (MULTI): ICD-10-CM

## 2024-09-14 DIAGNOSIS — S88.911S: ICD-10-CM

## 2024-09-14 DIAGNOSIS — N48.1 BALANITIS: Primary | ICD-10-CM

## 2024-09-14 DIAGNOSIS — L28.1 PRURIGO NODULARIS: ICD-10-CM

## 2024-09-14 DIAGNOSIS — S88.912S: ICD-10-CM

## 2024-09-14 LAB
ALBUMIN SERPL BCP-MCNC: 4.2 G/DL (ref 3.4–5)
ALP SERPL-CCNC: 88 U/L (ref 33–136)
ALT SERPL W P-5'-P-CCNC: 13 U/L (ref 10–52)
ANION GAP SERPL CALC-SCNC: 16 MMOL/L (ref 10–20)
AST SERPL W P-5'-P-CCNC: 17 U/L (ref 9–39)
BASOPHILS # BLD AUTO: 0.03 X10*3/UL (ref 0–0.1)
BASOPHILS NFR BLD AUTO: 0.6 %
BILIRUB SERPL-MCNC: 0.3 MG/DL (ref 0–1.2)
BUN SERPL-MCNC: 18 MG/DL (ref 6–23)
CALCIUM SERPL-MCNC: 9.7 MG/DL (ref 8.6–10.6)
CHLORIDE SERPL-SCNC: 104 MMOL/L (ref 98–107)
CO2 SERPL-SCNC: 25 MMOL/L (ref 21–32)
CREAT SERPL-MCNC: 0.69 MG/DL (ref 0.5–1.3)
EGFRCR SERPLBLD CKD-EPI 2021: >90 ML/MIN/1.73M*2
EOSINOPHIL # BLD AUTO: 0.08 X10*3/UL (ref 0–0.7)
EOSINOPHIL NFR BLD AUTO: 1.7 %
ERYTHROCYTE [DISTWIDTH] IN BLOOD BY AUTOMATED COUNT: 15.1 % (ref 11.5–14.5)
GLUCOSE BLD MANUAL STRIP-MCNC: 106 MG/DL (ref 74–99)
GLUCOSE SERPL-MCNC: 112 MG/DL (ref 74–99)
HCT VFR BLD AUTO: 43.1 % (ref 41–52)
HGB BLD-MCNC: 13.7 G/DL (ref 13.5–17.5)
IMM GRANULOCYTES # BLD AUTO: 0.01 X10*3/UL (ref 0–0.7)
IMM GRANULOCYTES NFR BLD AUTO: 0.2 % (ref 0–0.9)
LYMPHOCYTES # BLD AUTO: 1.69 X10*3/UL (ref 1.2–4.8)
LYMPHOCYTES NFR BLD AUTO: 36.6 %
MCH RBC QN AUTO: 24.7 PG (ref 26–34)
MCHC RBC AUTO-ENTMCNC: 31.8 G/DL (ref 32–36)
MCV RBC AUTO: 78 FL (ref 80–100)
MONOCYTES # BLD AUTO: 0.59 X10*3/UL (ref 0.1–1)
MONOCYTES NFR BLD AUTO: 12.8 %
NEUTROPHILS # BLD AUTO: 2.22 X10*3/UL (ref 1.2–7.7)
NEUTROPHILS NFR BLD AUTO: 48.1 %
NRBC BLD-RTO: 0 /100 WBCS (ref 0–0)
PLATELET # BLD AUTO: 234 X10*3/UL (ref 150–450)
POTASSIUM SERPL-SCNC: 4.5 MMOL/L (ref 3.5–5.3)
PROT SERPL-MCNC: 6.9 G/DL (ref 6.4–8.2)
RBC # BLD AUTO: 5.55 X10*6/UL (ref 4.5–5.9)
SODIUM SERPL-SCNC: 140 MMOL/L (ref 136–145)
WBC # BLD AUTO: 4.6 X10*3/UL (ref 4.4–11.3)

## 2024-09-14 PROCEDURE — 85025 COMPLETE CBC W/AUTO DIFF WBC: CPT

## 2024-09-14 PROCEDURE — 80053 COMPREHEN METABOLIC PANEL: CPT

## 2024-09-14 PROCEDURE — 82947 ASSAY GLUCOSE BLOOD QUANT: CPT

## 2024-09-14 PROCEDURE — 36415 COLL VENOUS BLD VENIPUNCTURE: CPT

## 2024-09-14 PROCEDURE — 99222 1ST HOSP IP/OBS MODERATE 55: CPT

## 2024-09-14 PROCEDURE — 99285 EMERGENCY DEPT VISIT HI MDM: CPT

## 2024-09-14 ASSESSMENT — PAIN DESCRIPTION - LOCATION: LOCATION: GROIN

## 2024-09-14 ASSESSMENT — PAIN DESCRIPTION - PAIN TYPE: TYPE: ACUTE PAIN

## 2024-09-14 ASSESSMENT — LIFESTYLE VARIABLES
EVER FELT BAD OR GUILTY ABOUT YOUR DRINKING: NO
EVER HAD A DRINK FIRST THING IN THE MORNING TO STEADY YOUR NERVES TO GET RID OF A HANGOVER: NO
HAVE YOU EVER FELT YOU SHOULD CUT DOWN ON YOUR DRINKING: NO
HAVE PEOPLE ANNOYED YOU BY CRITICIZING YOUR DRINKING: NO
TOTAL SCORE: 0

## 2024-09-14 ASSESSMENT — PAIN - FUNCTIONAL ASSESSMENT: PAIN_FUNCTIONAL_ASSESSMENT: 0-10

## 2024-09-14 ASSESSMENT — PAIN DESCRIPTION - DESCRIPTORS: DESCRIPTORS: BURNING;ITCHING

## 2024-09-14 ASSESSMENT — PAIN SCALES - GENERAL: PAINLEVEL_OUTOF10: 2

## 2024-09-15 VITALS
RESPIRATION RATE: 16 BRPM | OXYGEN SATURATION: 94 % | WEIGHT: 150 LBS | TEMPERATURE: 97.2 F | HEART RATE: 62 BPM | DIASTOLIC BLOOD PRESSURE: 71 MMHG | SYSTOLIC BLOOD PRESSURE: 137 MMHG | BODY MASS INDEX: 23.54 KG/M2 | HEIGHT: 67 IN

## 2024-09-15 PROBLEM — N48.1 BALANITIS: Status: ACTIVE | Noted: 2024-09-15

## 2024-09-15 LAB
ALBUMIN SERPL BCP-MCNC: 4.3 G/DL (ref 3.4–5)
ALP SERPL-CCNC: 94 U/L (ref 33–136)
ALT SERPL W P-5'-P-CCNC: 12 U/L (ref 10–52)
ANION GAP SERPL CALC-SCNC: 16 MMOL/L (ref 10–20)
APTT PPP: 34 SECONDS (ref 27–38)
AST SERPL W P-5'-P-CCNC: 15 U/L (ref 9–39)
BASOPHILS # BLD AUTO: 0.03 X10*3/UL (ref 0–0.1)
BASOPHILS NFR BLD AUTO: 0.7 %
BILIRUB SERPL-MCNC: 0.3 MG/DL (ref 0–1.2)
BUN SERPL-MCNC: 18 MG/DL (ref 6–23)
CALCIUM SERPL-MCNC: 9.5 MG/DL (ref 8.6–10.6)
CHLORIDE SERPL-SCNC: 104 MMOL/L (ref 98–107)
CO2 SERPL-SCNC: 22 MMOL/L (ref 21–32)
CREAT SERPL-MCNC: 0.65 MG/DL (ref 0.5–1.3)
EGFRCR SERPLBLD CKD-EPI 2021: >90 ML/MIN/1.73M*2
EOSINOPHIL # BLD AUTO: 0.09 X10*3/UL (ref 0–0.7)
EOSINOPHIL NFR BLD AUTO: 2.1 %
ERYTHROCYTE [DISTWIDTH] IN BLOOD BY AUTOMATED COUNT: 14.8 % (ref 11.5–14.5)
GLUCOSE BLD MANUAL STRIP-MCNC: 104 MG/DL (ref 74–99)
GLUCOSE BLD MANUAL STRIP-MCNC: 125 MG/DL (ref 74–99)
GLUCOSE BLD MANUAL STRIP-MCNC: 93 MG/DL (ref 74–99)
GLUCOSE SERPL-MCNC: 140 MG/DL (ref 74–99)
HCT VFR BLD AUTO: 43.2 % (ref 41–52)
HGB BLD-MCNC: 14.1 G/DL (ref 13.5–17.5)
IMM GRANULOCYTES # BLD AUTO: 0.01 X10*3/UL (ref 0–0.7)
IMM GRANULOCYTES NFR BLD AUTO: 0.2 % (ref 0–0.9)
INR PPP: 1 (ref 0.9–1.1)
LYMPHOCYTES # BLD AUTO: 1.74 X10*3/UL (ref 1.2–4.8)
LYMPHOCYTES NFR BLD AUTO: 40.4 %
MCH RBC QN AUTO: 25.3 PG (ref 26–34)
MCHC RBC AUTO-ENTMCNC: 32.6 G/DL (ref 32–36)
MCV RBC AUTO: 77 FL (ref 80–100)
MONOCYTES # BLD AUTO: 0.54 X10*3/UL (ref 0.1–1)
MONOCYTES NFR BLD AUTO: 12.5 %
NEUTROPHILS # BLD AUTO: 1.9 X10*3/UL (ref 1.2–7.7)
NEUTROPHILS NFR BLD AUTO: 44.1 %
NRBC BLD-RTO: 0 /100 WBCS (ref 0–0)
PLATELET # BLD AUTO: 238 X10*3/UL (ref 150–450)
POTASSIUM SERPL-SCNC: 4.4 MMOL/L (ref 3.5–5.3)
PROT SERPL-MCNC: 7.1 G/DL (ref 6.4–8.2)
PROTHROMBIN TIME: 10.8 SECONDS (ref 9.8–12.8)
RBC # BLD AUTO: 5.58 X10*6/UL (ref 4.5–5.9)
SODIUM SERPL-SCNC: 138 MMOL/L (ref 136–145)
WBC # BLD AUTO: 4.3 X10*3/UL (ref 4.4–11.3)

## 2024-09-15 PROCEDURE — 82947 ASSAY GLUCOSE BLOOD QUANT: CPT

## 2024-09-15 PROCEDURE — 99232 SBSQ HOSP IP/OBS MODERATE 35: CPT | Performed by: STUDENT IN AN ORGANIZED HEALTH CARE EDUCATION/TRAINING PROGRAM

## 2024-09-15 PROCEDURE — 36415 COLL VENOUS BLD VENIPUNCTURE: CPT

## 2024-09-15 PROCEDURE — 2500000004 HC RX 250 GENERAL PHARMACY W/ HCPCS (ALT 636 FOR OP/ED)

## 2024-09-15 PROCEDURE — 85610 PROTHROMBIN TIME: CPT

## 2024-09-15 PROCEDURE — 1100000001 HC PRIVATE ROOM DAILY

## 2024-09-15 PROCEDURE — 80053 COMPREHEN METABOLIC PANEL: CPT

## 2024-09-15 PROCEDURE — 2500000001 HC RX 250 WO HCPCS SELF ADMINISTERED DRUGS (ALT 637 FOR MEDICARE OP)

## 2024-09-15 PROCEDURE — 85025 COMPLETE CBC W/AUTO DIFF WBC: CPT

## 2024-09-15 RX ORDER — ACETAMINOPHEN 325 MG/1
650 TABLET ORAL EVERY 6 HOURS PRN
Status: DISCONTINUED | OUTPATIENT
Start: 2024-09-15 | End: 2024-09-16 | Stop reason: HOSPADM

## 2024-09-15 RX ORDER — DIPHENHYDRAMINE HCL 25 MG
25 CAPSULE ORAL EVERY 6 HOURS PRN
Status: DISCONTINUED | OUTPATIENT
Start: 2024-09-15 | End: 2024-09-16 | Stop reason: HOSPADM

## 2024-09-15 RX ORDER — METHOCARBAMOL 500 MG/1
500 TABLET, FILM COATED ORAL 2 TIMES DAILY PRN
COMMUNITY

## 2024-09-15 RX ORDER — PREGABALIN 75 MG/1
75 CAPSULE ORAL 2 TIMES DAILY
Status: DISCONTINUED | OUTPATIENT
Start: 2024-09-15 | End: 2024-09-16 | Stop reason: HOSPADM

## 2024-09-15 RX ORDER — DEXTROSE 50 % IN WATER (D50W) INTRAVENOUS SYRINGE
12.5
Status: DISCONTINUED | OUTPATIENT
Start: 2024-09-15 | End: 2024-09-16 | Stop reason: HOSPADM

## 2024-09-15 RX ORDER — ATORVASTATIN CALCIUM 80 MG/1
80 TABLET, FILM COATED ORAL NIGHTLY
Status: DISCONTINUED | OUTPATIENT
Start: 2024-09-15 | End: 2024-09-16 | Stop reason: HOSPADM

## 2024-09-15 RX ORDER — LOSARTAN POTASSIUM 100 MG/1
100 TABLET ORAL DAILY
Status: DISCONTINUED | OUTPATIENT
Start: 2024-09-15 | End: 2024-09-16 | Stop reason: HOSPADM

## 2024-09-15 RX ORDER — DULOXETIN HYDROCHLORIDE 60 MG/1
60 CAPSULE, DELAYED RELEASE ORAL DAILY
Status: DISCONTINUED | OUTPATIENT
Start: 2024-09-15 | End: 2024-09-16 | Stop reason: HOSPADM

## 2024-09-15 RX ORDER — HYDROCORTISONE 25 MG/G
CREAM TOPICAL 2 TIMES DAILY
Status: DISCONTINUED | OUTPATIENT
Start: 2024-09-15 | End: 2024-09-16 | Stop reason: HOSPADM

## 2024-09-15 RX ORDER — PANTOPRAZOLE SODIUM 20 MG/1
20 TABLET, DELAYED RELEASE ORAL
COMMUNITY

## 2024-09-15 RX ORDER — TRIAMCINOLONE ACETONIDE 1 MG/G
OINTMENT TOPICAL 2 TIMES DAILY
Status: DISCONTINUED | OUTPATIENT
Start: 2024-09-15 | End: 2024-09-16 | Stop reason: HOSPADM

## 2024-09-15 RX ORDER — DEXTROSE 50 % IN WATER (D50W) INTRAVENOUS SYRINGE
25
Status: DISCONTINUED | OUTPATIENT
Start: 2024-09-15 | End: 2024-09-16 | Stop reason: HOSPADM

## 2024-09-15 RX ORDER — TALC
3 POWDER (GRAM) TOPICAL NIGHTLY PRN
Status: DISCONTINUED | OUTPATIENT
Start: 2024-09-15 | End: 2024-09-16 | Stop reason: HOSPADM

## 2024-09-15 RX ORDER — POLYETHYLENE GLYCOL 3350 17 G/17G
17 POWDER, FOR SOLUTION ORAL DAILY PRN
Status: DISCONTINUED | OUTPATIENT
Start: 2024-09-15 | End: 2024-09-16 | Stop reason: HOSPADM

## 2024-09-15 RX ORDER — FAMOTIDINE 20 MG/1
20 TABLET, FILM COATED ORAL DAILY
Status: DISCONTINUED | OUTPATIENT
Start: 2024-09-15 | End: 2024-09-16 | Stop reason: HOSPADM

## 2024-09-15 RX ORDER — ENOXAPARIN SODIUM 100 MG/ML
40 INJECTION SUBCUTANEOUS EVERY 24 HOURS
Status: DISCONTINUED | OUTPATIENT
Start: 2024-09-15 | End: 2024-09-16 | Stop reason: HOSPADM

## 2024-09-15 RX ORDER — GABAPENTIN 100 MG/1
100 CAPSULE ORAL 3 TIMES DAILY
COMMUNITY

## 2024-09-15 RX ORDER — GENTAMICIN SULFATE 1 MG/G
1 OINTMENT TOPICAL DAILY
COMMUNITY

## 2024-09-15 RX ORDER — ASPIRIN 81 MG/1
81 TABLET ORAL DAILY
Status: DISCONTINUED | OUTPATIENT
Start: 2024-09-15 | End: 2024-09-16 | Stop reason: HOSPADM

## 2024-09-15 RX ORDER — INSULIN LISPRO 100 [IU]/ML
0-5 INJECTION, SOLUTION INTRAVENOUS; SUBCUTANEOUS
Status: DISCONTINUED | OUTPATIENT
Start: 2024-09-15 | End: 2024-09-16 | Stop reason: HOSPADM

## 2024-09-15 RX ADMIN — PREGABALIN 75 MG: 75 CAPSULE ORAL at 21:54

## 2024-09-15 RX ADMIN — HYDROCORTISONE: 25 CREAM TOPICAL at 21:53

## 2024-09-15 RX ADMIN — TRIAMCINOLONE ACETONIDE 1 APPLICATION: 1 OINTMENT TOPICAL at 08:12

## 2024-09-15 RX ADMIN — TRIAMCINOLONE ACETONIDE: 1 OINTMENT TOPICAL at 21:53

## 2024-09-15 RX ADMIN — LOSARTAN POTASSIUM 100 MG: 50 TABLET, FILM COATED ORAL at 08:12

## 2024-09-15 RX ADMIN — ATORVASTATIN CALCIUM 80 MG: 80 TABLET, FILM COATED ORAL at 21:54

## 2024-09-15 RX ADMIN — FAMOTIDINE 20 MG: 20 TABLET ORAL at 08:12

## 2024-09-15 RX ADMIN — HYDROCORTISONE 1 APPLICATION: 25 CREAM TOPICAL at 08:13

## 2024-09-15 RX ADMIN — TRIAMCINOLONE ACETONIDE: 1 OINTMENT TOPICAL at 05:41

## 2024-09-15 RX ADMIN — HYDROCORTISONE: 25 CREAM TOPICAL at 05:41

## 2024-09-15 RX ADMIN — ENOXAPARIN SODIUM 40 MG: 40 INJECTION SUBCUTANEOUS at 08:11

## 2024-09-15 RX ADMIN — PREGABALIN 75 MG: 75 CAPSULE ORAL at 08:12

## 2024-09-15 RX ADMIN — ASPIRIN 81 MG: 81 TABLET, COATED ORAL at 08:12

## 2024-09-15 RX ADMIN — EMPAGLIFLOZIN 25 MG: 25 TABLET, FILM COATED ORAL at 08:12

## 2024-09-15 RX ADMIN — DULOXETINE HYDROCHLORIDE 60 MG: 60 CAPSULE, DELAYED RELEASE ORAL at 08:12

## 2024-09-15 SDOH — HEALTH STABILITY: PHYSICAL HEALTH: ON AVERAGE, HOW MANY MINUTES DO YOU ENGAGE IN EXERCISE AT THIS LEVEL?: 30 MIN

## 2024-09-15 SDOH — SOCIAL STABILITY: SOCIAL INSECURITY: ARE YOU OR HAVE YOU BEEN THREATENED OR ABUSED PHYSICALLY, EMOTIONALLY, OR SEXUALLY BY ANYONE?: NO

## 2024-09-15 SDOH — ECONOMIC STABILITY: HOUSING INSECURITY: IN THE PAST 12 MONTHS, HOW MANY TIMES HAVE YOU MOVED WHERE YOU WERE LIVING?: 0

## 2024-09-15 SDOH — SOCIAL STABILITY: SOCIAL INSECURITY: DOES ANYONE TRY TO KEEP YOU FROM HAVING/CONTACTING OTHER FRIENDS OR DOING THINGS OUTSIDE YOUR HOME?: NO

## 2024-09-15 SDOH — SOCIAL STABILITY: SOCIAL NETWORK: IN A TYPICAL WEEK, HOW MANY TIMES DO YOU TALK ON THE PHONE WITH FAMILY, FRIENDS, OR NEIGHBORS?: TWICE A WEEK

## 2024-09-15 SDOH — SOCIAL STABILITY: SOCIAL INSECURITY: HAS ANYONE EVER THREATENED TO HURT YOUR FAMILY OR YOUR PETS?: NO

## 2024-09-15 SDOH — ECONOMIC STABILITY: HOUSING INSECURITY: AT ANY TIME IN THE PAST 12 MONTHS, WERE YOU HOMELESS OR LIVING IN A SHELTER (INCLUDING NOW)?: PATIENT DECLINED

## 2024-09-15 SDOH — SOCIAL STABILITY: SOCIAL NETWORK: HOW OFTEN DO YOU ATTEND CHURCH OR RELIGIOUS SERVICES?: 1 TO 4 TIMES PER YEAR

## 2024-09-15 SDOH — SOCIAL STABILITY: SOCIAL INSECURITY: HAVE YOU HAD THOUGHTS OF HARMING ANYONE ELSE?: NO

## 2024-09-15 SDOH — SOCIAL STABILITY: SOCIAL NETWORK
DO YOU BELONG TO ANY CLUBS OR ORGANIZATIONS SUCH AS CHURCH GROUPS UNIONS, FRATERNAL OR ATHLETIC GROUPS, OR SCHOOL GROUPS?: NO

## 2024-09-15 SDOH — SOCIAL STABILITY: SOCIAL INSECURITY: WERE YOU ABLE TO COMPLETE ALL THE BEHAVIORAL HEALTH SCREENINGS?: YES

## 2024-09-15 SDOH — SOCIAL STABILITY: SOCIAL NETWORK: HOW OFTEN DO YOU GET TOGETHER WITH FRIENDS OR RELATIVES?: TWICE A WEEK

## 2024-09-15 SDOH — ECONOMIC STABILITY: TRANSPORTATION INSECURITY
IN THE PAST 12 MONTHS, HAS LACK OF TRANSPORTATION KEPT YOU FROM MEETINGS, WORK, OR FROM GETTING THINGS NEEDED FOR DAILY LIVING?: PATIENT DECLINED

## 2024-09-15 SDOH — SOCIAL STABILITY: SOCIAL INSECURITY
WITHIN THE LAST YEAR, HAVE TO BEEN RAPED OR FORCED TO HAVE ANY KIND OF SEXUAL ACTIVITY BY YOUR PARTNER OR EX-PARTNER?: PATIENT DECLINED

## 2024-09-15 SDOH — SOCIAL STABILITY: SOCIAL INSECURITY: DO YOU FEEL UNSAFE GOING BACK TO THE PLACE WHERE YOU ARE LIVING?: NO

## 2024-09-15 SDOH — SOCIAL STABILITY: SOCIAL INSECURITY
WITHIN THE LAST YEAR, HAVE YOU BEEN HUMILIATED OR EMOTIONALLY ABUSED IN OTHER WAYS BY YOUR PARTNER OR EX-PARTNER?: PATIENT DECLINED

## 2024-09-15 SDOH — SOCIAL STABILITY: SOCIAL NETWORK: HOW OFTEN DO YOU ATTENT MEETINGS OF THE CLUB OR ORGANIZATION YOU BELONG TO?: NEVER

## 2024-09-15 SDOH — SOCIAL STABILITY: SOCIAL INSECURITY: DO YOU FEEL ANYONE HAS EXPLOITED OR TAKEN ADVANTAGE OF YOU FINANCIALLY OR OF YOUR PERSONAL PROPERTY?: NO

## 2024-09-15 SDOH — SOCIAL STABILITY: SOCIAL INSECURITY: WITHIN THE LAST YEAR, HAVE YOU BEEN AFRAID OF YOUR PARTNER OR EX-PARTNER?: PATIENT DECLINED

## 2024-09-15 SDOH — SOCIAL STABILITY: SOCIAL INSECURITY: HAVE YOU HAD ANY THOUGHTS OF HARMING ANYONE ELSE?: NO

## 2024-09-15 SDOH — SOCIAL STABILITY: SOCIAL INSECURITY
WITHIN THE LAST YEAR, HAVE YOU BEEN KICKED, HIT, SLAPPED, OR OTHERWISE PHYSICALLY HURT BY YOUR PARTNER OR EX-PARTNER?: PATIENT DECLINED

## 2024-09-15 SDOH — ECONOMIC STABILITY: FOOD INSECURITY: WITHIN THE PAST 12 MONTHS, THE FOOD YOU BOUGHT JUST DIDN'T LAST AND YOU DIDN'T HAVE MONEY TO GET MORE.: PATIENT DECLINED

## 2024-09-15 SDOH — HEALTH STABILITY: PHYSICAL HEALTH: ON AVERAGE, HOW MANY DAYS PER WEEK DO YOU ENGAGE IN MODERATE TO STRENUOUS EXERCISE (LIKE A BRISK WALK)?: 2 DAYS

## 2024-09-15 SDOH — HEALTH STABILITY: MENTAL HEALTH
STRESS IS WHEN SOMEONE FEELS TENSE, NERVOUS, ANXIOUS, OR CAN'T SLEEP AT NIGHT BECAUSE THEIR MIND IS TROUBLED. HOW STRESSED ARE YOU?: PATIENT DECLINED

## 2024-09-15 SDOH — ECONOMIC STABILITY: FOOD INSECURITY: WITHIN THE PAST 12 MONTHS, YOU WORRIED THAT YOUR FOOD WOULD RUN OUT BEFORE YOU GOT MONEY TO BUY MORE.: PATIENT DECLINED

## 2024-09-15 SDOH — ECONOMIC STABILITY: INCOME INSECURITY: IN THE LAST 12 MONTHS, WAS THERE A TIME WHEN YOU WERE NOT ABLE TO PAY THE MORTGAGE OR RENT ON TIME?: PATIENT DECLINED

## 2024-09-15 SDOH — SOCIAL STABILITY: SOCIAL INSECURITY: ABUSE: ADULT

## 2024-09-15 SDOH — SOCIAL STABILITY: SOCIAL NETWORK: ARE YOU MARRIED, WIDOWED, DIVORCED, SEPARATED, NEVER MARRIED, OR LIVING WITH A PARTNER?: PATIENT DECLINED

## 2024-09-15 SDOH — ECONOMIC STABILITY: TRANSPORTATION INSECURITY
IN THE PAST 12 MONTHS, HAS THE LACK OF TRANSPORTATION KEPT YOU FROM MEDICAL APPOINTMENTS OR FROM GETTING MEDICATIONS?: PATIENT DECLINED

## 2024-09-15 SDOH — SOCIAL STABILITY: SOCIAL INSECURITY: ARE THERE ANY APPARENT SIGNS OF INJURIES/BEHAVIORS THAT COULD BE RELATED TO ABUSE/NEGLECT?: NO

## 2024-09-15 SDOH — ECONOMIC STABILITY: INCOME INSECURITY: HOW HARD IS IT FOR YOU TO PAY FOR THE VERY BASICS LIKE FOOD, HOUSING, MEDICAL CARE, AND HEATING?: PATIENT DECLINED

## 2024-09-15 ASSESSMENT — PATIENT HEALTH QUESTIONNAIRE - PHQ9
SUM OF ALL RESPONSES TO PHQ9 QUESTIONS 1 & 2: 0
1. LITTLE INTEREST OR PLEASURE IN DOING THINGS: NOT AT ALL
2. FEELING DOWN, DEPRESSED OR HOPELESS: NOT AT ALL

## 2024-09-15 ASSESSMENT — LIFESTYLE VARIABLES
AUDIT-C TOTAL SCORE: 0
AUDIT-C TOTAL SCORE: 0
HOW OFTEN DO YOU HAVE 6 OR MORE DRINKS ON ONE OCCASION: NEVER
SKIP TO QUESTIONS 9-10: 1
HOW OFTEN DO YOU HAVE A DRINK CONTAINING ALCOHOL: NEVER
HOW MANY STANDARD DRINKS CONTAINING ALCOHOL DO YOU HAVE ON A TYPICAL DAY: PATIENT DOES NOT DRINK

## 2024-09-15 ASSESSMENT — ACTIVITIES OF DAILY LIVING (ADL): LACK_OF_TRANSPORTATION: PATIENT DECLINED

## 2024-09-15 NOTE — ED PROVIDER NOTES
CC: Rash     HPI:  Patient  is a 63 y.o. male with bilateral ATK amputations, history of peripheral vascular disease, hypertension, diabetes, hyperlipidemia, balanitis initiated on antifungal, and Perrigo nodularis who presented to the ED for rash of his groin.  Patient notes persistence of what rash around his penis. Patient is unsure of the name of the medications that he is supposed to apply daily.  He notes a burning and itching sensation of his groin and penis.  Patient did note that he saw dermatology 3 days ago.  He has had difficulty taking care of himself.  Denied fevers, chills, trauma, falls, chest pain, difficulty breathing, headache, abdominal pain, back pain, neck pain, penile discharge, dysuria, nausea, and vomiting.    Limitations to history: None  Independent historian(s): Patient  Records Reviewed: Recent available ED and inpatient notes reviewed in EMR.    PMHx/PSHx:  Per HPI.   - has a past medical history of Anxiety, Diabetes mellitus (Multi), HLD (hyperlipidemia), and Hypertension.  - has a past surgical history that includes CT angio abdomen pelvis w and or wo IV IV contrast (12/16/2022); CT angio abdomen pelvis w and or wo IV IV contrast (8/18/2023); and CT angio abdomen pelvis w and or wo IV IV contrast (9/3/2023).    Medications:  Reviewed in EMR. See EMR for complete list of medications and doses.    Allergies:  Penicillins    Social History:  - Tobacco:  reports that he has been smoking cigarettes. He has never used smokeless tobacco.   - Alcohol:  reports that he does not currently use alcohol.   - Illicit Drugs:  reports no history of drug use.     ROS:  Per HPI.       ???????????????????????????????????????????????????????????????  Triage Vitals:  T 36.4 °C (97.5 °F)  HR 65  /68  RR 16  O2 99 %      Physical Exam  Vitals and nursing note reviewed.   Constitutional:       General: He is not in acute distress.  HENT:      Head: Normocephalic and atraumatic.      Nose: Nose  normal.      Mouth/Throat:      Mouth: Mucous membranes are moist.   Eyes:      Conjunctiva/sclera: Conjunctivae normal.   Cardiovascular:      Rate and Rhythm: Normal rate and regular rhythm.      Pulses: Normal pulses.   Pulmonary:      Effort: Pulmonary effort is normal. No respiratory distress.      Breath sounds: Normal breath sounds.   Abdominal:      Palpations: Abdomen is soft.      Tenderness: There is no abdominal tenderness.   Genitourinary:     Comments: Edema and wet appearance of the penis.  Patient noted to have white exudate surrounding the penis.  No findings concerning for necrosis or open wounds.  Atraumatic appearance of penis and testicles.  No edema or TTP of the testes.  Skin:     General: Skin is warm.   Neurological:      General: No focal deficit present.      Mental Status: He is alert.         ???????????????????????????????????????????????????????????????  Labs:   Labs Reviewed   POCT GLUCOSE - Abnormal       Result Value    POCT Glucose 106 (*)         Imaging:   No orders to display        MDM:  Patient  is a 63 y.o. male with bilateral ATK amputations, history of peripheral vascular disease, hypertension, diabetes, hyperlipidemia, balanitis initiated on antifungal, and Perrigo nodularis who presented to the ED for rash of his groin.  Patient is HDS.  Physical exam findings significant for edema wet appearance of the patient's penis.  Patient notes that his rash is acute on chronic likely secondary to difficulty taking care of himself.  Low clinical concern for necrotizing infection, sepsis, acute traumatic process, acute metabolic process, UTI, and acute intra-abdominal process.  Basic labs were unremarkable.  Discussed ED findings and admission for nursing facility placement with the patient.  Patient stated understanding and agreement with the plan.  All questions were answered.  Discussed patient presentation with admitting team.  Patient admitted to medicine in stable  condition.    ED Course:  Diagnoses as of 09/16/24 1959   Balanitis       Social Determinants Limiting Care:  None identified    Disposition:  Admission to medicine    Samuel Massey MD   Emergency Medicine PGY-3  [facility]    Comment: Please note this report has been produced using speech recognition software and may contain errors related to that system including errors in grammar, punctuation, and spelling as well as words and phrases that may be inappropriate.  If there are any questions or concerns please feel free to contact the dictating provider for clarification.    Procedures ? [employer] last updated 9/14/2024 9:25 PM        Samuel Massey MD  Resident  09/16/24 2013

## 2024-09-15 NOTE — PROGRESS NOTES
"Link Macias is a 63 y.o. male on day 0 of admission presenting with Balanitis.    Subjective   Patient resting comfortably in bed. No concerns or questions at this time. No overnight events.       Objective     Physical Exam  Vitals reviewed.   Constitutional:       General: He is not in acute distress.  HENT:      Head: Normocephalic and atraumatic.      Nose: Nose normal.      Mouth/Throat:      Mouth: Mucous membranes are moist.      Pharynx: Oropharynx is clear.   Eyes:      Extraocular Movements: Extraocular movements intact.      Conjunctiva/sclera: Conjunctivae normal.      Pupils: Pupils are equal, round, and reactive to light.   Cardiovascular:      Rate and Rhythm: Normal rate.      Pulses: Normal pulses.      Heart sounds: Normal heart sounds. No murmur heard.     No friction rub. No gallop.   Pulmonary:      Effort: Pulmonary effort is normal.      Breath sounds: Normal breath sounds.   Abdominal:      General: Abdomen is flat. Bowel sounds are normal.      Palpations: Abdomen is soft.   Genitourinary:     Comments: Normal development of penis and scrotum, circumcised.  Penis and scrotum appear wet. There is weeping/seeping of surrounding skin, no visible open sores noted. Shaft and head of the penis tender to touch. Swelling of the shaft and glans penis without swelling of the scrotum. Erythema and friable skin is noted in the left anterior scrotum (2/2 excoriation?).  Musculoskeletal:      Cervical back: Normal range of motion and neck supple.   Skin:     General: Skin is warm and dry.      Capillary Refill: Capillary refill takes less than 2 seconds.   Neurological:      General: No focal deficit present.      Mental Status: He is alert.   Psychiatric:         Mood and Affect: Mood normal.         Behavior: Behavior normal.         Last Recorded Vitals  Blood pressure 137/71, pulse 62, temperature 36.2 °C (97.2 °F), temperature source Temporal, resp. rate 16, height 1.702 m (5' 7\"), weight 68 kg " (150 lb), SpO2 94%.  Intake/Output last 3 Shifts:  No intake/output data recorded.    Relevant Results    Scheduled medications  aspirin, 81 mg, oral, Daily  atorvastatin, 80 mg, oral, Nightly  DULoxetine, 60 mg, oral, Daily  empagliflozin, 25 mg, oral, Daily  enoxaparin, 40 mg, subcutaneous, q24h  famotidine, 20 mg, oral, Daily  hydrocortisone, , Topical, BID  insulin lispro, 0-5 Units, subcutaneous, TID  losartan, 100 mg, oral, Daily  pregabalin, 75 mg, oral, BID  triamcinolone, , Topical, BID    Continuous medications     PRN medications  PRN medications: acetaminophen, dextrose, dextrose, diphenhydrAMINE, glucagon, glucagon, melatonin, polyethylene glycol    Results for orders placed or performed during the hospital encounter of 09/14/24 (from the past 24 hour(s))   POCT GLUCOSE   Result Value Ref Range    POCT Glucose 106 (H) 74 - 99 mg/dL   CBC and Auto Differential   Result Value Ref Range    WBC 4.6 4.4 - 11.3 x10*3/uL    nRBC 0.0 0.0 - 0.0 /100 WBCs    RBC 5.55 4.50 - 5.90 x10*6/uL    Hemoglobin 13.7 13.5 - 17.5 g/dL    Hematocrit 43.1 41.0 - 52.0 %    MCV 78 (L) 80 - 100 fL    MCH 24.7 (L) 26.0 - 34.0 pg    MCHC 31.8 (L) 32.0 - 36.0 g/dL    RDW 15.1 (H) 11.5 - 14.5 %    Platelets 234 150 - 450 x10*3/uL    Neutrophils % 48.1 40.0 - 80.0 %    Immature Granulocytes %, Automated 0.2 0.0 - 0.9 %    Lymphocytes % 36.6 13.0 - 44.0 %    Monocytes % 12.8 2.0 - 10.0 %    Eosinophils % 1.7 0.0 - 6.0 %    Basophils % 0.6 0.0 - 2.0 %    Neutrophils Absolute 2.22 1.20 - 7.70 x10*3/uL    Immature Granulocytes Absolute, Automated 0.01 0.00 - 0.70 x10*3/uL    Lymphocytes Absolute 1.69 1.20 - 4.80 x10*3/uL    Monocytes Absolute 0.59 0.10 - 1.00 x10*3/uL    Eosinophils Absolute 0.08 0.00 - 0.70 x10*3/uL    Basophils Absolute 0.03 0.00 - 0.10 x10*3/uL   Comprehensive metabolic panel   Result Value Ref Range    Glucose 112 (H) 74 - 99 mg/dL    Sodium 140 136 - 145 mmol/L    Potassium 4.5 3.5 - 5.3 mmol/L    Chloride 104 98 -  107 mmol/L    Bicarbonate 25 21 - 32 mmol/L    Anion Gap 16 10 - 20 mmol/L    Urea Nitrogen 18 6 - 23 mg/dL    Creatinine 0.69 0.50 - 1.30 mg/dL    eGFR >90 >60 mL/min/1.73m*2    Calcium 9.7 8.6 - 10.6 mg/dL    Albumin 4.2 3.4 - 5.0 g/dL    Alkaline Phosphatase 88 33 - 136 U/L    Total Protein 6.9 6.4 - 8.2 g/dL    AST 17 9 - 39 U/L    Bilirubin, Total 0.3 0.0 - 1.2 mg/dL    ALT 13 10 - 52 U/L   CBC and Auto Differential   Result Value Ref Range    WBC 4.3 (L) 4.4 - 11.3 x10*3/uL    nRBC 0.0 0.0 - 0.0 /100 WBCs    RBC 5.58 4.50 - 5.90 x10*6/uL    Hemoglobin 14.1 13.5 - 17.5 g/dL    Hematocrit 43.2 41.0 - 52.0 %    MCV 77 (L) 80 - 100 fL    MCH 25.3 (L) 26.0 - 34.0 pg    MCHC 32.6 32.0 - 36.0 g/dL    RDW 14.8 (H) 11.5 - 14.5 %    Platelets 238 150 - 450 x10*3/uL    Neutrophils % 44.1 40.0 - 80.0 %    Immature Granulocytes %, Automated 0.2 0.0 - 0.9 %    Lymphocytes % 40.4 13.0 - 44.0 %    Monocytes % 12.5 2.0 - 10.0 %    Eosinophils % 2.1 0.0 - 6.0 %    Basophils % 0.7 0.0 - 2.0 %    Neutrophils Absolute 1.90 1.20 - 7.70 x10*3/uL    Immature Granulocytes Absolute, Automated 0.01 0.00 - 0.70 x10*3/uL    Lymphocytes Absolute 1.74 1.20 - 4.80 x10*3/uL    Monocytes Absolute 0.54 0.10 - 1.00 x10*3/uL    Eosinophils Absolute 0.09 0.00 - 0.70 x10*3/uL    Basophils Absolute 0.03 0.00 - 0.10 x10*3/uL   Comprehensive Metabolic Panel   Result Value Ref Range    Glucose 140 (H) 74 - 99 mg/dL    Sodium 138 136 - 145 mmol/L    Potassium 4.4 3.5 - 5.3 mmol/L    Chloride 104 98 - 107 mmol/L    Bicarbonate 22 21 - 32 mmol/L    Anion Gap 16 10 - 20 mmol/L    Urea Nitrogen 18 6 - 23 mg/dL    Creatinine 0.65 0.50 - 1.30 mg/dL    eGFR >90 >60 mL/min/1.73m*2    Calcium 9.5 8.6 - 10.6 mg/dL    Albumin 4.3 3.4 - 5.0 g/dL    Alkaline Phosphatase 94 33 - 136 U/L    Total Protein 7.1 6.4 - 8.2 g/dL    AST 15 9 - 39 U/L    Bilirubin, Total 0.3 0.0 - 1.2 mg/dL    ALT 12 10 - 52 U/L   Coagulation Screen   Result Value Ref Range    Protime 10.8  9.8 - 12.8 seconds    INR 1.0 0.9 - 1.1    aPTT 34 27 - 38 seconds   POCT GLUCOSE   Result Value Ref Range    POCT Glucose 104 (H) 74 - 99 mg/dL   POCT GLUCOSE   Result Value Ref Range    POCT Glucose 93 74 - 99 mg/dL   POCT GLUCOSE   Result Value Ref Range    POCT Glucose 125 (H) 74 - 99 mg/dL       Assessment/Plan   Assessment & Plan  Balanitis    63 y.o. male with PMHx of Bilateral AKA, PAD, Anxiety, T2DM, HTN, HLD, Balanitis initiated on antifungal cream, and prurigo nodularis who presented to the ED with recurrent rash around his groin and inability to care for himself. HDS on presentation to the ED (although BP elevated). Labs unremarkable. Exact etiology of recurrent rash, itching and burning unclear at this time but this may represent recurrent Balanitis vs prurigo nodularis vs chronic skin rash. We will consult dermatology,  for placement and evaluate further.       #Candida balanitis vs prurigo nodularis   #Chronic skin rash  - c/o of recurrent rash, itching and burning sensation of the groin and penis.  - Was given benadryl during last admission which patient endorsed was helpeful  Plan  - hydrocortisone 2.5 % cream Apply topically 2 times a day  - triamcinolone (Kenalog) 0.1 % cream  - Diphenhydramine (BenadryL) 25 mg q8 PRN  - Consider Dermatology consult in the morning  - Order Coags, CBC, CMP, Mg  -  consult for placement on discharge     #HTN  #HLD  - continue home Aspirin 81 mg daily  - continue home Atorvastatin 80 mg daily  - continue home losartan 100 mg daily  - Holding off on home Metoprolol tartrate 25 mg BID (HR 57)     #T2DM  - Glu 112 on arrival in the ED, A1c 6.3 (8/27/24)  Plan   - Will hold home metformin  - SSI  - Continue home Jardiance 25mg daily  - AC and HS glucose checks     #Anxiety  #?Seizures  - patient endorses childhood seizures but denies any seizure through out his adult life  - Denies taking oxcarbazepine (Trileptal) 150 mg BID  Plan  - Continue  home duloxetine 60 mg daily  - Melatonin 3 mg nightly   - Holding off on oxcarbazepine (Trileptal) 150 mg BID      #PAD  #Phantom limb pain   #Bilateral AKA amputation  - follows closely with pain medicine clinic and physical therapy  Plan  - Continue home Lyrica 75 mg BID  - To follow with pain medicine on discharge   - Tylenol 650 mg q6 PRN  - PT/OT consult      #GERD  -Continue home famotidine 20 mg daily      F: PRN  E: Replete PRN  N: Adult diet   A: PIV  DVT ppx: Levonox      Code Status: Full Code (confirmed on admission)   NOK: Extended Emergency Contact Information  Primary Emergency Contact: Alex Macias  [place] Phone: [phone]  Mobile Phone: [phone]  Relation: Brother  Secondary Emergency Contact: Jojo Macias  Mobile Phone: [phone]  Relation: Sister   needed? No     I spent 35 minutes in the professional and overall care of this patient.      Juarez Palacios MD

## 2024-09-15 NOTE — H&P
MEDICINE ADMISSION NOTE    History of Present Illness  Link Macias is a 63 y.o. male with PMHx of Bilateral AKA, PAD, Anxiety, T2DM, HTN, HLD, Balanitis initiated on antifungal cream, and prurigo nodularis who presented to the ED with recurrent rash around his groin and inability to care for himself.     According to the patient, for the past few days he has noticed rash with associated burning sensation and itching around his groin and penis. He feels this is similar to the episodes he has had in the past. This is relieved partially with the medications given to him at the dermatology clinic. He denies any associated trauma, contact with an irritant or foreign object/substance. He denies any recent change in medications or allergen exposure. He denies any fever, chills, SOB, chest pain, cough, headache, n/v, abd pain, change in bowel or dietary habit. He endorses not been able to take care of himself and agreeable to placement at a nursing facility.     Of note patient presented to the ED 8/19 with similar complaints. He stated that he was started on meloxicam 2 days prior and since then has had a new rash.  He states the rash began immediately after taking meloxicam.  Zthere was concern  for fixed drug eruptions and meloxicam was discontinued. However, since then he has had several ED visits for same complaints. On his presentation to the ED on 8/29 for similar complaint, there was concern for cellulitis and he was started on a course of oral Bactrim. He was seen by dermatology on 9/11 and per chart review, there was concern for Prurigo nodularis of which he was started on topical medications with plan to follow as outpatient.     ED Course:  Vitals:  T 36.4, HR 65, /68, RR 16, SpO2 99% on RA    Labs:  WBC 4.6, Hgb 13.7, plt 234  Na 140, K 4.6, Cl 104, HCO3 25, BUN 18, Cr 0.69, glu 112  Ca 9.7, tprot 6.9, alb 4.2, alkphos 88, AST 17, ALT 13, tbili 0.3,     PMH: As above   Allergies  Penicillins  PSH:  Bilateral aorta-femoral bypass   Social History  He reports that he has been smoking cigarettes. He has never used smokeless tobacco. He reports that he does not currently use alcohol. He reports that he does not use drugs. He lives by himself and needs assistance with ADL.    Home Medications   Aspirin 81 mg daily  Atorvastatin 80 mg daily  losartan 100 mg daily  Melatonin 3 mg nightly   Metoprolol tartrate 25 mg BID  [id number] mg daily   SSI  Nitroglycerine SL 0.4 mg   Jardiance 25mg daily  Lyrica 75 mg mg BID   Duloxetine 60daily  Famotidine 20mg daily    Physical Exam   Constitutional: No acute distress, resting comfortably in bed, cooperative  HEENT: Normocephalic, atraumatic, PERRLA, EOMI, moist mucous membranes, no pharyngeal erythema  Cardiovascular: RRR, normal S1/S2, no murmurs noted  Pulmonary: Clear to auscultation b/l, no wheezes/crackles/rhonchi, no increased work of breathing, no supplemental oxygen  GI: Soft, non-tender, non-distended, normoactive bowel sounds  : No reyes catheter  Normal development of penis and scrotum, circumcised.  Penis and scrotum appear wet. There is weeping/seeping of surrounding skin, no visible open sores noted. Shaft and head of the penis tender to touch . Appears to be some swelling of the shaft and head of the penis without swelling of the scrotum. Pain is unchanged when lifting testes.   Erythema and friable skin is noted in the left anterior scrotum which may be due to scratching.    Lower extremities: Bilateral AKA  Neuro: A&O x3, no focal deficit on upper extremity  Psych: Appropriate mood and affect     Assessment/Plan     63 y.o. male with PMHx of Bilateral AKA, PAD, Anxiety, T2DM, HTN, HLD, Balanitis initiated on antifungal cream, and prurigo nodularis who presented to the ED with recurrent rash around his groin and inability to care for himself. HDS on presentation to the ED (although BP elevated). Labs unremarkable. Exact etiology of recurrent rash, itching  and burning unclear at this time but this may represent recurrent Balanitis vs prurigo nodularis vs chronic skin rash. We will consult dermatology,  for placement and evaluate further.      #Candida balanitis vs prurigo nodularis   #Chronic skin rash  - c/o of recurrent rash, itching and burning sensation of the groin and penis.  - Was given benadryl during last admission which patient endorsed was helpeful  Plan  - hydrocortisone 2.5 % cream Apply topically 2 times a day  - triamcinolone (Kenalog) 0.1 % cream  - Diphenhydramine (BenadryL) 25 mg q8 PRN  - Consider Dermatology consult in the morning  - Order Coags, CBC, CMP, Mg  -  consult for placement on discharge    #HTN  #HLD  - continue home Aspirin 81 mg daily  - continue home Atorvastatin 80 mg daily  - continue home losartan 100 mg daily  - Holding off on home Metoprolol tartrate 25 mg BID (HR 57)     #T2DM  - Glu 112 on arrival in the ED, A1c 6.3 (8/27/24)  Plan   - Will hold home metformin  - SSI  - Continue home Jardiance 25mg daily  - AC and HS glucose checks    #Anxiety  #?Seizures  - patient endorses childhood seizures but denies any seizure through out his adult life  - Denies taking oxcarbazepine (Trileptal) 150 mg BID  Plan  - Continue home duloxetine 60 mg daily  - Melatonin 3 mg nightly   - Holding off on oxcarbazepine (Trileptal) 150 mg BID      #PAD  #Phantom limb pain   #Bilateral AKA amputation  - follows closely with pain medicine clinic and physical therapy  Plan  - Continue home Lyrica 75 mg BID  - To follow with pain medicine on discharge   - Tylenol 650 mg q6 PRN  - PT/OT consult     #GERD  -Continue home famotidine 20 mg daily     F: PRN  E: Replete PRN  N: Adult diet   A: PIV  DVT ppx: Levonox     Code Status: Full Code (confirmed on admission)   NOK: Extended Emergency Contact Information  Primary Emergency Contact: Alex Macias  Home Phone: [phone]  Mobile Phone: [phone]  Relation:  Brother  Secondary Emergency Contact: Jojo Macias  Mobile Phone: [phone]  Relation: Sister   needed? No      Justin Cobb MD MPH

## 2024-09-15 NOTE — PROGRESS NOTES
Pharmacy Medication History Review    Link Macias is a 63 y.o. male admitted for Balanitis. Pharmacy reviewed the patient's [id number] medications and allergies for accuracy.    Medications ADDED:  Gentamicin Ointment  Methocarbamol  Pantoprazole  Gabapentin   Medications CHANGED:  N/A  Medications REMOVED/NO LONGER TAKING:   Lotrimin   Zofran     The list below reflects the updated PTA list.   Prior to Admission Medications   Prescriptions Last Dose Informant   DULoxetine (Cymbalta) 60 mg DR capsule     Sig: Take 1 capsule (60 mg) by mouth once daily.   OXcarbazepine (Trileptal) 150 mg tablet 9/14/2024 at Night Self   Sig: Take 1 tablet (150 mg) by mouth 2 times a day.   acetaminophen (Tylenol) 325 mg tablet 9/14/2024 Self   Sig: Take 2 tablets (650 mg) by mouth every 6 hours if needed for mild pain (1 - 3).   aspirin 81 mg EC tablet 9/14/2024 Self   Sig: Take 1 tablet (81 mg) by mouth once daily.   atorvastatin (Lipitor) 80 mg tablet 9/14/2024 at Night Self   Sig: Take 1 tablet (80 mg) by mouth once daily at bedtime.   cholecalciferol (Vitamin D-3) 25 MCG (1000 UT) capsule 9/14/2024 Self   Sig: Take 1 capsule (25 mcg) by mouth once daily.   clotrimazole (Lotrimin) 1 % cream Not Taking Self   Sig: Apply 1 Application topically 2 times a day. Apply to affected area 2 times daily until symptoms resolve   Patient not taking: Reported on 9/15/2024   diphenhydrAMINE (BenadryL) 25 mg capsule 9/14/2024 Self   Sig: Take 1 capsule (25 mg) by mouth every 8 hours if needed for itching or allergies for up to 3 days.   docusate sodium (Colace) 100 mg capsule Past Week Self   Sig: Take 1 capsule (100 mg) by mouth 2 times a day as needed for constipation.   empagliflozin (Jardiance) 25 mg 9/14/2024 at Morning Self   Sig: Take 1 tablet (25 mg) by mouth once daily with breakfast.   famotidine (Pepcid) 20 mg tablet 9/14/2024 Self   Sig: Take 1 tablet (20 mg) by mouth once daily.   gabapentin (Neurontin) 100 mg capsule  Past Month Self   Sig: Take 1 capsule (100 mg) by mouth 3 times a day.   gentamicin (Garamycin) 0.1 % ointment Past Month Self   Sig: Apply 1 Application topically once daily.   hydrOXYzine HCL (Atarax) 25 mg tablet 9/14/2024 Self   Sig: Take 1 tablet (25 mg) by mouth once daily as needed for itching or anxiety.   hydrocortisone (Anusol-HC) 2.5 % rectal cream 9/15/2024 at Morning Self   Sig: Insert 1 Application into the rectum 2 times a day as needed for hemorrhoids.   hydrocortisone 2.5 % cream  Self   Sig: Apply topically 2 times a day. To rash on genitals. 14 days on, 7 days off. Repeat as needed for rash.   losartan (Cozaar) 100 mg tablet 9/14/2024 Self   Sig: Take 1 tablet (100 mg) by mouth once daily.   melatonin 3 mg tablet 9/14/2024 at Night Self   Sig: Take 1 tablet (3 mg) by mouth once daily at bedtime.   metFORMIN (Glucophage) 500 mg tablet 9/14/2024 at Night Self   Sig: Take 1 tablet (500 mg) by mouth 2 times a day with meals.   methocarbamol (Robaxin) 500 mg tablet Past Month Self   Sig: Take 1 tablet (500 mg) by mouth 2 times a day as needed for muscle spasms.   metoprolol tartrate (Lopressor) 25 mg tablet 9/14/2024 at Night Self   Sig: Take 1 tablet (25 mg) by mouth 2 times a day.   naproxen (Naprosyn) 375 mg tablet Past Month Self   Sig: Take 1 tablet (375 mg) by mouth every 12 hours if needed for mild pain (1 - 3).   nitroglycerin (Nitrostat) 0.4 mg SL tablet Past Week Self   Sig: Place 1 tablet (0.4 mg) under the tongue every 5 minutes if needed for chest pain. Up to 3 times. IF NO RELIEF CALL 911   omega-3 acid ethyl esters (Lovaza) 1 gram capsule 9/14/2024 Self   Sig: Take 2 capsules (2 g) by mouth once daily.   pantoprazole (ProtoNix) 20 mg EC tablet Past Month Self   Sig: Take 1 tablet (20 mg) by mouth once daily in the morning. Take before meals. Do not crush, chew, or split. Patient takes as needed.   permethrin (Elimite) 5 % cream Past Week Self   Sig: Apply topically 1 time for 1 dose.  "Apply from neck down to entire body at night. Wash off in morning. Repeat in 1 week.   pregabalin (Lyrica) 75 mg capsule Past Month Self   Sig: Take 1 capsule (75 mg) by mouth 2 times a day.   triamcinolone (Kenalog) 0.1 % cream 9/15/2024 at Morning Self   Sig: Apply topically 2 times a day. To rash on trunk and extremities. 14 days on, 7 days off. Repeat as needed for rash.      Facility-Administered Medications: None        The list below reflects the updated allergy list. Please review each documented allergy for additional clarification and justification.  Allergies  Reviewed by Breanna Phoenix on 9/15/2024        Severity Reactions Comments    Penicillins Not Specified Unknown             Patient accepts M2B at discharge.     Sources:   Sources included out patient fill history, OARRS, and patient interview (Patient was a good historian)    Additional Comments:  Patient was familiar with his meds.       MICHELLE CINTRON [place]  Pharmacy Technician  09/15/24     Secure Chat preferred   If no response call [id number] or [employer] \"Med Rec\"   "

## 2024-09-15 NOTE — ED TRIAGE NOTES
Pt BIB EMS with c/o a rash on his groin. Pt states it is warm to touch and began today. No other complaints.

## 2024-09-16 ENCOUNTER — PHARMACY VISIT (OUTPATIENT)
Dept: PHARMACY | Facility: CLINIC | Age: 64
End: 2024-09-16
Payer: MEDICAID

## 2024-09-16 ENCOUNTER — DOCUMENTATION (OUTPATIENT)
Dept: HOME HEALTH SERVICES | Facility: HOME HEALTH | Age: 64
End: 2024-09-16
Payer: MEDICAID

## 2024-09-16 ENCOUNTER — TELEPHONE (OUTPATIENT)
Dept: HOME HEALTH SERVICES | Facility: HOME HEALTH | Age: 64
End: 2024-09-16
Payer: MEDICAID

## 2024-09-16 ENCOUNTER — HOME HEALTH ADMISSION (OUTPATIENT)
Dept: HOME HEALTH SERVICES | Facility: HOME HEALTH | Age: 64
End: 2024-09-16
Payer: MEDICAID

## 2024-09-16 VITALS
RESPIRATION RATE: 18 BRPM | WEIGHT: 150 LBS | DIASTOLIC BLOOD PRESSURE: 78 MMHG | HEART RATE: 51 BPM | BODY MASS INDEX: 23.54 KG/M2 | SYSTOLIC BLOOD PRESSURE: 177 MMHG | OXYGEN SATURATION: 98 % | HEIGHT: 67 IN | TEMPERATURE: 97.2 F

## 2024-09-16 LAB
ALBUMIN SERPL BCP-MCNC: 4.2 G/DL (ref 3.4–5)
ALP SERPL-CCNC: 91 U/L (ref 33–136)
ALT SERPL W P-5'-P-CCNC: 11 U/L (ref 10–52)
ANION GAP SERPL CALC-SCNC: 13 MMOL/L (ref 10–20)
AST SERPL W P-5'-P-CCNC: 16 U/L (ref 9–39)
BASOPHILS # BLD AUTO: 0.03 X10*3/UL (ref 0–0.1)
BASOPHILS NFR BLD AUTO: 0.8 %
BILIRUB SERPL-MCNC: 0.3 MG/DL (ref 0–1.2)
BUN SERPL-MCNC: 15 MG/DL (ref 6–23)
CALCIUM SERPL-MCNC: 9.3 MG/DL (ref 8.6–10.6)
CHLORIDE SERPL-SCNC: 102 MMOL/L (ref 98–107)
CO2 SERPL-SCNC: 28 MMOL/L (ref 21–32)
CREAT SERPL-MCNC: 0.75 MG/DL (ref 0.5–1.3)
EGFRCR SERPLBLD CKD-EPI 2021: >90 ML/MIN/1.73M*2
EOSINOPHIL # BLD AUTO: 0.09 X10*3/UL (ref 0–0.7)
EOSINOPHIL NFR BLD AUTO: 2.5 %
ERYTHROCYTE [DISTWIDTH] IN BLOOD BY AUTOMATED COUNT: 15 % (ref 11.5–14.5)
GLUCOSE BLD MANUAL STRIP-MCNC: 107 MG/DL (ref 74–99)
GLUCOSE BLD MANUAL STRIP-MCNC: 192 MG/DL (ref 74–99)
GLUCOSE SERPL-MCNC: 100 MG/DL (ref 74–99)
HCT VFR BLD AUTO: 46.8 % (ref 41–52)
HGB BLD-MCNC: 14.3 G/DL (ref 13.5–17.5)
IMM GRANULOCYTES # BLD AUTO: 0.02 X10*3/UL (ref 0–0.7)
IMM GRANULOCYTES NFR BLD AUTO: 0.5 % (ref 0–0.9)
LYMPHOCYTES # BLD AUTO: 1.54 X10*3/UL (ref 1.2–4.8)
LYMPHOCYTES NFR BLD AUTO: 42.1 %
MCH RBC QN AUTO: 24.9 PG (ref 26–34)
MCHC RBC AUTO-ENTMCNC: 30.6 G/DL (ref 32–36)
MCV RBC AUTO: 81 FL (ref 80–100)
MONOCYTES # BLD AUTO: 0.61 X10*3/UL (ref 0.1–1)
MONOCYTES NFR BLD AUTO: 16.7 %
NEUTROPHILS # BLD AUTO: 1.37 X10*3/UL (ref 1.2–7.7)
NEUTROPHILS NFR BLD AUTO: 37.4 %
NRBC BLD-RTO: 0 /100 WBCS (ref 0–0)
PLATELET # BLD AUTO: 253 X10*3/UL (ref 150–450)
POTASSIUM SERPL-SCNC: 4.3 MMOL/L (ref 3.5–5.3)
PROT SERPL-MCNC: 7.1 G/DL (ref 6.4–8.2)
RBC # BLD AUTO: 5.75 X10*6/UL (ref 4.5–5.9)
SODIUM SERPL-SCNC: 139 MMOL/L (ref 136–145)
WBC # BLD AUTO: 3.7 X10*3/UL (ref 4.4–11.3)

## 2024-09-16 PROCEDURE — RXMED WILLOW AMBULATORY MEDICATION CHARGE

## 2024-09-16 PROCEDURE — 85025 COMPLETE CBC W/AUTO DIFF WBC: CPT

## 2024-09-16 PROCEDURE — 99238 HOSP IP/OBS DSCHRG MGMT 30/<: CPT

## 2024-09-16 PROCEDURE — 2500000002 HC RX 250 W HCPCS SELF ADMINISTERED DRUGS (ALT 637 FOR MEDICARE OP, ALT 636 FOR OP/ED)

## 2024-09-16 PROCEDURE — 2500000001 HC RX 250 WO HCPCS SELF ADMINISTERED DRUGS (ALT 637 FOR MEDICARE OP)

## 2024-09-16 PROCEDURE — 2500000004 HC RX 250 GENERAL PHARMACY W/ HCPCS (ALT 636 FOR OP/ED)

## 2024-09-16 PROCEDURE — 36415 COLL VENOUS BLD VENIPUNCTURE: CPT

## 2024-09-16 PROCEDURE — 80053 COMPREHEN METABOLIC PANEL: CPT

## 2024-09-16 PROCEDURE — 82947 ASSAY GLUCOSE BLOOD QUANT: CPT

## 2024-09-16 PROCEDURE — 97162 PT EVAL MOD COMPLEX 30 MIN: CPT | Mod: GP | Performed by: PHYSICAL THERAPIST

## 2024-09-16 RX ORDER — PERMETHRIN 50 MG/G
CREAM TOPICAL ONCE
Start: 2024-09-16 | End: 2024-09-16

## 2024-09-16 RX ORDER — FLUCONAZOLE 200 MG/1
200 TABLET ORAL DAILY
Status: DISCONTINUED | OUTPATIENT
Start: 2024-09-16 | End: 2024-09-16 | Stop reason: HOSPADM

## 2024-09-16 RX ORDER — HYDROCORTISONE 25 MG/G
CREAM TOPICAL 2 TIMES DAILY
Qty: 30 G | Refills: 0 | Status: SHIPPED | OUTPATIENT
Start: 2024-09-16 | End: 2024-09-21

## 2024-09-16 RX ORDER — TRIAMCINOLONE ACETONIDE 1 MG/G
OINTMENT TOPICAL 2 TIMES DAILY
Qty: 30 G | Refills: 0 | Status: SHIPPED | OUTPATIENT
Start: 2024-09-16 | End: 2024-09-21

## 2024-09-16 RX ADMIN — ENOXAPARIN SODIUM 40 MG: 40 INJECTION SUBCUTANEOUS at 08:37

## 2024-09-16 RX ADMIN — ASPIRIN 81 MG: 81 TABLET, COATED ORAL at 08:37

## 2024-09-16 RX ADMIN — INSULIN LISPRO 1 UNITS: 100 INJECTION, SOLUTION INTRAVENOUS; SUBCUTANEOUS at 12:28

## 2024-09-16 RX ADMIN — PREGABALIN 75 MG: 75 CAPSULE ORAL at 08:37

## 2024-09-16 RX ADMIN — FLUCONAZOLE 200 MG: 200 TABLET ORAL at 14:53

## 2024-09-16 RX ADMIN — LOSARTAN POTASSIUM 100 MG: 50 TABLET, FILM COATED ORAL at 08:37

## 2024-09-16 RX ADMIN — FAMOTIDINE 20 MG: 20 TABLET ORAL at 08:37

## 2024-09-16 RX ADMIN — TRIAMCINOLONE ACETONIDE: 1 OINTMENT TOPICAL at 08:38

## 2024-09-16 RX ADMIN — ACETAMINOPHEN 650 MG: 325 TABLET ORAL at 10:46

## 2024-09-16 RX ADMIN — DULOXETINE HYDROCHLORIDE 60 MG: 60 CAPSULE, DELAYED RELEASE ORAL at 08:37

## 2024-09-16 RX ADMIN — HYDROCORTISONE: 25 CREAM TOPICAL at 08:38

## 2024-09-16 RX ADMIN — EMPAGLIFLOZIN 25 MG: 25 TABLET, FILM COATED ORAL at 08:37

## 2024-09-16 ASSESSMENT — PAIN SCALES - GENERAL
PAINLEVEL_OUTOF10: 3
PAINLEVEL_OUTOF10: 2

## 2024-09-16 ASSESSMENT — COGNITIVE AND FUNCTIONAL STATUS - GENERAL
CLIMB 3 TO 5 STEPS WITH RAILING: TOTAL
MOBILITY SCORE: 14
STANDING UP FROM CHAIR USING ARMS: TOTAL
WALKING IN HOSPITAL ROOM: TOTAL
MOVING TO AND FROM BED TO CHAIR: A LITTLE

## 2024-09-16 ASSESSMENT — PAIN - FUNCTIONAL ASSESSMENT
PAIN_FUNCTIONAL_ASSESSMENT: 0-10

## 2024-09-16 ASSESSMENT — PAIN DESCRIPTION - DESCRIPTORS
DESCRIPTORS: ITCHING
DESCRIPTORS: ITCHING;ACHING

## 2024-09-16 ASSESSMENT — ACTIVITIES OF DAILY LIVING (ADL)
LACK_OF_TRANSPORTATION: PATIENT DECLINED
ADLS_ADDRESSED: NO
ADL_ASSISTANCE: INDEPENDENT

## 2024-09-16 NOTE — CARE PLAN
Problem: Pain - Adult  Goal: Verbalizes/displays adequate comfort level or baseline comfort level  Outcome: Progressing     Problem: Safety - Adult  Goal: Free from fall injury  Outcome: Progressing     Problem: Discharge Planning  Goal: Discharge to home or other facility with appropriate resources  Outcome: Progressing     Problem: Chronic Conditions and Co-morbidities  Goal: Patient's chronic conditions and co-morbidity symptoms are monitored and maintained or improved  Outcome: Progressing   The patient's goals for the shift include      The clinical goals for the shift include  patient will remain safe throughout my shift

## 2024-09-16 NOTE — PROGRESS NOTES
Physical Therapy    Physical Therapy Evaluation    Patient Name: Link Macias  MRN: [id number]  Department: [place] [id number]  Room: [facility]-  Today's Date: 9/16/2024   Time Calculation  Start Time: 0905  Stop Time: 0929  Time Calculation (min): 24 min    Assessment/Plan   PT Assessment  PT Assessment Results: Decreased strength, Decreased endurance, Impaired balance, Decreased mobility, Decreased skin integrity, Pain  Rehab Prognosis: Good  Barriers to Discharge: none noted  Evaluation/Treatment Tolerance: Patient tolerated treatment well  Medical Staff Made Aware: Yes  Strengths: Ability to acquire knowledge, Attitude of self, Coping skills, Premorbid level of function, Support of Caregivers, Housing layout  Barriers to Participation: Other (Comment) (none noted)  End of Session Communication: Bedside nurse  Assessment Comment: 62 yo male with groin rash and premorbid bilateral BKA presents with mild Left shoulder pain, groin pain, mild sitting balance deficits and decreased endurance. Recommend home with assist of home health aide and a few visits of low intensity home therapy to assess home set-up/safety/fit of WC and environment.  End of Session Patient Position: Bed, 3 rail up, Alarm on  IP OR SWING BED PT PLAN  Inpatient or Swing Bed: Inpatient  PT Plan  Treatment/Interventions: Bed mobility, Transfer training, Balance training, Neuromuscular re-education, Strengthening, Endurance training, Range of motion, Therapeutic activity, Therapeutic exercise, Home exercise program, Positioning, Postural re-education, Wheelchair management  PT Plan: Ongoing PT  PT Frequency: 2 times per week  PT Discharge Recommendations: Low intensity level of continued care  Equipment Recommended upon Discharge: Other (comment) (no new PT equipment needs noted)  PT Recommended Transfer Status: Assist x1, Contact guard (bed to/from WC with anterior/posterior approach with CG/hold WC.)  PT - OK to Discharge: Yes (PT eval completed and DC  recs made)      Subjective   General Visit Information:  General  Reason for Referral: Admitted 9/14 with groin rash and inability to care for himself; dx: candida balanitis vs. Prurigo nodularis, chronic skin rash  Past Medical History Relevant to Rehab: Bilateral AKA, PAD, Anxiety, T2DM, HTN, HLD, Balanitis, seizures  Missed Visit: No  Family/Caregiver Present: No  Co-Treatment:  (N/A)  Prior to Session Communication: Bedside nurse  Patient Position Received: Bed, 2 rail up, Alarm on  Preferred Learning Style: verbal  General Comment: Per RN pt cleared for activity despite activity orders.Pt supine alert and engaged, reports groin pain as noted and intermittent Left shoulder pain. Pt reports that he does not want to go to rehab/be placed, he doesn't want to lose his apartment/his home healthe aide and he feels like he is functioning well at home.  Home Living:  Home Living  Type of Home: Apartment  Lives With: Alone  Home Adaptive Equipment:  (shower chair with back, grab bars, handheld shower, hospital bed, manual WC, power WC (doesn't work, new one on order))  Home Layout: One level  Home Access: Elevator, Level entry  Bathroom Shower/Tub: Walk-in shower  Bathroom Toilet: Standard  Bathroom Equipment: Shower chair with back, Hand-held shower hose, Grab bars in shower  Prior Level of Function:  Prior Function Per Pt/Caregiver Report  Level of Angelina:  (per pt: ind with transfers (bed to/from ), independent mobility in WC (using bilateral UEs), no falls)  Receives Help From:  (DENITA M-F for 3 hours)  ADL Assistance: Independent  Homemaking Assistance: Needs assistance (HHA helps with laundry/groceries sometimes/cleaning; he doesn't drive- takes public transportation)  Ambulatory Assistance:  (independent with WC mobility)  Hand Dominance: Right  Precautions:  Precautions  Medical Precautions: Fall precautions (bilateral AKA, DM, groin rash, Left shoulder pain)    Vital Signs (Past 2hrs)        Date/Time  Vitals Session Patient Position Pulse Resp SpO2 BP MAP (mmHg)    09/16/24 0817 --  --  51  18  98 %  177/78  --                         Objective   Pain:  Pain Assessment  Pain Assessment: 0-10  0-10 (Numeric) Pain Score:  (10/10 Left shoulder with some ROM and alittle with WC mobility)  Pain Interventions: Repositioned, Therapeutic touch, Therapeutic presence, Rest  Response to Interventions: comfortable eating end of session, RN aware of his pain  Cognition:  Cognition  Overall Cognitive Status: Within Functional Limits    General Assessments:                  Activity Tolerance  Endurance: Tolerates 10 - 20 min exercise with multiple rests (fatigues after short bout of WC mobility as noted (hospital WC too wide/difficult to wheel))    Sensation  Light Touch: Not tested       Postural Control  Postural Control: Within Functional Limits  Posture Comment: in wider hospital WC in sitting: learning posteriorly, sacral sitting, mild rounded shoulders    Static Sitting Balance  Static Sitting-Balance Support: Bilateral upper extremity supported  Static Sitting-Level of Assistance:  (SBA)  Dynamic Sitting Balance  Dynamic Sitting-Balance Support: Bilateral upper extremity supported  Dynamic Sitting-Level of Assistance:  (SBA/CGA)  Dynamic Sitting-Balance:  (bed to/from WC transfers as noted)    Static Standing Balance  Static Standing-Balance Support:  (N/A (pt doesn't have prosthetics))  Dynamic Standing Balance  Dynamic Standing-Balance Support:  (N/A (pt doesn't have prosthetics))  Functional Assessments:  ADL  ADL's Addressed: No    Bed Mobility  Bed Mobility: Yes  Bed Mobility 1  Bed Mobility 1: Supine to sitting, Sitting to supine  Level of Assistance 1: Close supervision  Bed Mobility Comments 1: HOB elevated 55 degrees, use of rail, in/out from Right side of bed    Transfers  Transfer: Yes  Transfer 1  Transfer From 1: Bed to  Transfer to 1: Wheelchair  Technique 1:  (WC to/from bed (anterior/posterior transfers  with front of WC flush with bed/legrest swung out of bed))  Transfer Level of Assistance 1: Contact guard (PT holding WC steady)    Ambulation/Gait Training  Ambulation/Gait Training Performed: No (N/A (doesn't have prosthetics))    Stairs  Stairs: No    Wheelchair Activities  Wheelchair Size: [facility]  Wheelchair Cushion:  (N/A)  Wheelchair Parts Management:  (min A with legrest)  Propulsion: Yes  Propulsion Type 1: Manual  Level 1: Level tile (and turns)  Method 1: Left upper extremity, Right upper extremity  Level of Assistance 1: Contact guard  Extremity/Trunk Assessments:  RUE   RUE : Exceptions to WFL  RUE AROM (degrees)  RUE AROM Comment: grasp, elbow flex/ext and shoulder elevation grossly WFL  RUE Strength  RUE Overall Strength:  (grasp 4+, elbow flex/ext grossly 4, shoulder elevation grossly 4/5)  LUE   LUE: Exceptions to WFL  LUE AROM (degrees)  LUE AROM Comment: grasp, elbow flex/ext and shoulder elevation grossly WFL (mild discomfort)  LUE Strength  LUE Overall Strength:  (grasp 4+, elbow flex/ext grossly 4, shoulder elevation grossly 4/5 (mild discomfort))  RLE   RLE :  (N/A (very high AKA- able to flex hip slightly))  LLE   LLE :  (N/A Left hip able to flex slightly)  Outcome Measures:  [facility] Basic Mobility  Turning from your back to your side while in a flat bed without using bedrails: None  Moving from lying on your back to sitting on the side of a flat bed without using bedrails: None  Moving to and from bed to chair (including a wheelchair): A little  Standing up from a chair using your arms (e.g. wheelchair or bedside chair): Total  To walk in hospital room: Total  Climbing 3-5 steps with railing: Total  Basic Mobility - Total Score: 14    Encounter Problems       Encounter Problems (Active)       General Goals       independent with HEP (Not Progressing)       Start:  09/16/24    Expected End:  09/30/24               Mobility       supine to/from sit (HOB elevated, use of rail) and bed  to/from WC using anterior/posterior scooting approach (front of WC flush with bed) modified independent (Progressing)       Start:  09/16/24    Expected End:  09/30/24            wheel ' including turns and doorways using bilateral UES modified independent with no shoulder pain, stable heartrate (Progressing)       Start:  09/16/24    Expected End:  09/30/24               Pain - Adult              Education Documentation  Precautions, taught by Julia Puente PT at 9/16/2024  9:38 AM.  Learner: Patient  Readiness: Acceptance  Method: Explanation, Demonstration  Response: Verbalizes Understanding  Comment: PT purpose/POC/DC recs, need for assist with all in-hospital mobility, unsafe to stay up in our WC due to no anti-tippers    Body Mechanics, taught by Julia Puente PT at 9/16/2024  9:38 AM.  Learner: Patient  Readiness: Acceptance  Method: Explanation, Demonstration  Response: Verbalizes Understanding  Comment: PT purpose/POC/DC recs, need for assist with all in-hospital mobility, unsafe to stay up in our WC due to no anti-tippers    Mobility Training, taught by Julia Puente PT at 9/16/2024  9:38 AM.  Learner: Patient  Readiness: Acceptance  Method: Explanation, Demonstration  Response: Verbalizes Understanding  Comment: PT purpose/POC/DC recs, need for assist with all in-hospital mobility, unsafe to stay up in our WC due to no anti-tippers    Education Comments  No comments found.

## 2024-09-16 NOTE — PROGRESS NOTES
09/16/24 1500   Discharge Planning   Living Arrangements Alone   Support Systems Friends/neighbors;Family members   Assistance Needed yes   Type of Residence Private residence   Number of Stairs to Enter Residence 0   Number of Stairs Within Residence 0   Do you have animals or pets at home? No   Who is requesting discharge planning? Patient   Home or Post Acute Services None   Type of Home Care Services Home nursing visits   Expected Discharge Disposition SNF   Does the patient need discharge transport arranged? Yes   RoundTrip coordination needed? Yes   Has discharge transport been arranged? Yes   Financial Resource Strain   How hard is it for you to pay for the very basics like food, housing, medical care, and heating? Pt Declined   Housing Stability   In the last 12 months, was there a time when you were not able to pay the mortgage or rent on time? Pt Declined   In the past 12 months, how many times have you moved where you were living? 0   At any time in the past 12 months, were you homeless or living in a shelter (including now)? Pt Declined   Transportation Needs   In the past 12 months, has lack of transportation kept you from medical appointments or from getting medications? Pt Declined   In the past 12 months, has lack of transportation kept you from meetings, work, or from getting things needed for daily living? Pt Declined     Transitional Care Coordination Progress Note:  Patient discussed during interdisciplinary rounds.   Team members present: MD and TCC  Plan per Medical/Surgical team: Patient medically ready for discharge.  Payor:  Medicaid  Discharge disposition: Home with [facility] care for medication Management.  Potential Barriers: None  ADOD: 09/16/24     Previous Home Care: Non- medical aids that help with house work and shopping.  DME: Wheelchair  Pharmacy: [facility].  Falls: No  PCP:  Dr. Laith Childers    Assessment Note:  Met with patient and Introduced myself as care  coordinator and member of the Care Transitions team for discharge planning. Patient lives at home alone.  Transportation: Yes will need transportation home, patient has no legs, and does not have wheelchair here, so will need ambulance ride home to take him into house.  Will continue to follow patient for discharge needs.    Patient will be getting picked up by [facility] at 6:00 Pm tonight 09/16/24. Doctor, patient, nurse and  aware.

## 2024-09-16 NOTE — DISCHARGE SUMMARY
Discharge Diagnosis  Balanitis      Issues Requiring Follow-Up  - Follow up with Dermatology     - Follow up with PCP     - Patient to establish with home health     Test Results Pending At Discharge  Pending Labs       No current pending labs.            Hospital Course  Link Macias is a 63 y.o. male with PMHx of Bilateral AKA, PAD, Anxiety, T2DM, HTN, HLD, Balanitis initiated on antifungal cream, and prurigo nodularis who presented to the ED with recurrent rash around his groin and inability to care for himself. Patient seen in ED 8/19, 8/29, 9/2, and 9/9 for similar complaint regarding groin skin complain, as well as Derm outpatient on 9/14. In the ED virals were unremarkable, as was lab work with CBC, CMP performed. Patient was given benadryl for the itching and burning sensation in penis and groin. Patient was given hydrocortisone 2.5% cream which was previously prescribed by Derm for this affected area. Additionally, social work was consulted to determine placement. Patient was given 1 time fluconazole on 9/16. Social work and primary team attempted to get patient placed in facility, but patient declined stating he wants to remain in his apartment. Patient was amenable to home health. At time of discharge patient was hemodynamically stable. Patient to be discharged with home care. Continue hydrocortisone cream for genital irritation. Kenalog cream for non-genital skin irritation (e.g., trunk / extremities).     Pertinent Physical Exam At Time of Discharge  Physical Exam  Constitutional:       General: He is not in acute distress.     Appearance: Normal appearance. He is not ill-appearing, toxic-appearing or diaphoretic.   HENT:      Head: Normocephalic and atraumatic.   Eyes:      Extraocular Movements: Extraocular movements intact.   Cardiovascular:      Rate and Rhythm: Normal rate and regular rhythm.      Pulses: Normal pulses.   Pulmonary:      Effort: Pulmonary effort is normal.   Musculoskeletal:       Cervical back: Normal range of motion.      Comments: Bilateral Above the Knee Amputations    Skin:     Findings: Rash present.      Comments: Penis and scrotum appear dry and clean. There is no appreciable weeping/seeping of surrounding skin at this time. No visible open sores noted. Mild tenderness to touch skin of scrotum, penis, and skin immediately proximal.    Neurological:      General: No focal deficit present.      Mental Status: He is alert and oriented to person, place, and time.   Psychiatric:         Mood and Affect: Mood normal.         Behavior: Behavior normal.         Home Medications     Medication List      CHANGE how you take these medications     * hydrocortisone 2.5 % rectal cream; Commonly known as: Anusol-HC; What   changed: Another medication with the same name was added. Make sure you   understand how and when to take each.   * hydrocortisone 2.5 % cream; Apply topically 2 times a day. To rash on   genitals. 14 days on, 7 days off. Repeat as needed for rash.; What   changed: Another medication with the same name was added. Make sure you   understand how and when to take each.   * hydrocortisone 2.5 % cream; Apply topically 2 times a day.; What   changed: You were already taking a medication with the same name, and this   prescription was added. Make sure you understand how and when to take   each.   * triamcinolone 0.1 % cream; Commonly known as: Kenalog; Apply topically   2 times a day. To rash on trunk and extremities. 14 days on, 7 days off.   Repeat as needed for rash.; What changed: Another medication with the same   name was added. Make sure you understand how and when to take each.   * triamcinolone 0.1 % ointment; Commonly known as: Kenalog; Apply   topically 2 times a day.; What changed: You were already taking a   medication with the same name, and this prescription was added. Make sure   you understand how and when to take each.  * This list has 5 medication(s) that are the same  as other medications   prescribed for you. Read the directions carefully, and ask your doctor or   other care provider to review them with you.     CONTINUE taking these medications     acetaminophen 325 mg tablet; Commonly known as: Tylenol; Take 2 tablets   (650 mg) by mouth every 6 hours if needed for mild pain (1 - 3).   aspirin 81 mg EC tablet; Take 1 tablet (81 mg) by mouth once daily.   atorvastatin 80 mg tablet; Commonly known as: Lipitor; Take 1 tablet (80   mg) by mouth once daily at bedtime.   cholecalciferol 25 MCG (1000 UT) capsule; Commonly known as: Vitamin D-3   diphenhydrAMINE 25 mg capsule; Commonly known as: BenadryL; Take 1   capsule (25 mg) by mouth every 8 hours if needed for itching or allergies   for up to 3 days.   docusate sodium 100 mg capsule; Commonly known as: Colace   DULoxetine 60 mg DR capsule; Commonly known as: Cymbalta   empagliflozin 25 mg; Commonly known as: Jardiance; Take 1 tablet (25 mg)   by mouth once daily with breakfast.   famotidine 20 mg tablet; Commonly known as: Pepcid   gabapentin 100 mg capsule; Commonly known as: Neurontin   gentamicin 0.1 % ointment; Commonly known as: Garamycin   hydrOXYzine HCL 25 mg tablet; Commonly known as: Atarax   losartan 100 mg tablet; Commonly known as: Cozaar; Take 1 tablet (100   mg) by mouth once daily.   melatonin 3 mg tablet; Take 1 tablet (3 mg) by mouth once daily at   bedtime.   metFORMIN 500 mg tablet; Commonly known as: Glucophage; Take 1 tablet   (500 mg) by mouth 2 times a day with meals.   methocarbamol 500 mg tablet; Commonly known as: Robaxin   metoprolol tartrate 25 mg tablet; Commonly known as: Lopressor; Take 1   tablet (25 mg) by mouth 2 times a day.   naproxen 375 mg tablet; Commonly known as: Naprosyn   nitroglycerin 0.4 mg SL tablet; Commonly known as: Nitrostat   omega-3 acid ethyl esters 1 gram capsule; Commonly known as: Lovaza   OXcarbazepine 150 mg tablet; Commonly known as: Trileptal; Take 1 tablet   (150 mg)  by mouth 2 times a day.   pantoprazole 20 mg EC tablet; Commonly known as: ProtoNix   permethrin 5 % cream; Commonly known as: Elimite; Apply topically 1 time   for 1 dose. Apply from neck down to entire body at night. Wash off in   morning. Repeat in 1 week.   pregabalin 75 mg capsule; Commonly known as: Lyrica     STOP taking these medications     clotrimazole 1 % cream; Commonly known as: Lotrimin       Outpatient Follow-Up  Future Appointments   Date Time Provider Department Center   10/11/2024  8:30 AM Aliza Cisse MD [id number]       Hari Foster MD

## 2024-09-16 NOTE — PROGRESS NOTES
Pharmacy Admission Order Reconciliation Review    Link Macias is a 63 y.o. male admitted for Balanitis. Pharmacy reviewed the patient's unreconciled admission medications.    Prior to admission medications that were reviewed and acted on by the pharmacist include:  Acetaminophen  Aspirin  Atorvastatin  Vitamin D3  Clotrimazole  Benadryl  Colace  Cymbalta  Jardiance  Pepcid  Hydrocortisone  Losartan  Melatonin  Lyrica  Kenalog    These medications have been reconciled.     Any other unreconcilied medications have been addressed and will be ordered or held by the patient's medical team. Medications addressed by the pharmacist may be added or changed by the patient's medical team at any time.    Ashanti Osorio, PharmD  Transitions of Care Pharmacist  [facility]  Please reach out via Secure Chat for questions     Olumiant Pregnancy And Lactation Text: Based on animal studies, Olumiant may cause embryo-fetal harm when administered to pregnant women.  The medication should not be used in pregnancy.  Breastfeeding is not recommended during treatment.

## 2024-09-16 NOTE — HH CARE COORDINATION
Home Care received a Referral for Nursing. We have processed the referral for a Start of Care on 9.19.24 TO 9.20.24.     If you have any questions or concerns, please feel free to contact us at [phone]. Follow the prompts, enter your five digit zip code, and you will be directed to your care team on [facility] 3.

## 2024-09-16 NOTE — HOSPITAL COURSE
Link Macias is a 63 y.o. male with PMHx of Bilateral AKA, PAD, Anxiety, T2DM, HTN, HLD, Balanitis initiated on antifungal cream, and prurigo nodularis who presented to the ED with recurrent rash around his groin and inability to care for himself. Patient seen in ED 8/19, 8/29, 9/2, and 9/9 for similar complaint regarding groin skin complain, as well as Derm outpatient on 9/14. In the ED virals were unremarkable, as was lab work with CBC, CMP performed. Patient was given benadryl for the itching and burning sensation in penis and groin. Patient was given hydrocortisone 2.5% cream which was previously prescribed by Derm for this affected area. Additionally, social work was consulted to determine placement. Patient was given 1 time fluconazole on 9/16. Social work and primary team attempted to get patient placed in facility, but patient declined stating he wants to remain in his apartment. Patient was amenable to home health. At time of discharge patient was hemodynamically stable. Patient to be discharged with home care. Continue hydrocortisone cream for genital irritation. Kenalog cream for non-genital skin irritation (e.g., trunk / extremities).

## 2024-09-16 NOTE — DISCHARGE INSTRUCTIONS
Continue using 2.5% hydrocortisone cream on genitals and surrounding tissue.     Follow up with Derm and PCP

## 2024-09-16 NOTE — CARE PLAN
Problem: Fall/Injury  Goal: Not fall by end of shift  Outcome: Progressing  Goal: Be free from injury by end of the shift  Outcome: Progressing  Goal: Verbalize understanding of personal risk factors for fall in the hospital  Outcome: Progressing  Goal: Verbalize understanding of risk factor reduction measures to prevent injury from fall in the home  Outcome: Progressing  Goal: Use assistive devices by end of the shift  Outcome: Progressing  Goal: Pace activities to prevent fatigue by end of the shift  Outcome: Progressing     Problem: Pain  Goal: Takes deep breaths with improved pain control throughout the shift  Outcome: Progressing  Goal: Turns in bed with improved pain control throughout the shift  Outcome: Progressing  Goal: Walks with improved pain control throughout the shift  Outcome: Progressing  Goal: Performs ADL's with improved pain control throughout shift  Outcome: Progressing  Goal: Participates in PT with improved pain control throughout the shift  Outcome: Progressing  Goal: Free from opioid side effects throughout the shift  Outcome: Progressing  Goal: Free from acute confusion related to pain meds throughout the shift  Outcome: Progressing     Problem: Skin  Goal: Decreased wound size/increased tissue granulation at next dressing change  Outcome: Progressing  Goal: Participates in plan/prevention/treatment measures  Outcome: Progressing  Goal: Prevent/manage excess moisture  Outcome: Progressing  Goal: Prevent/minimize sheer/friction injuries  Outcome: Progressing  Goal: Promote/optimize nutrition  Outcome: Progressing  Goal: Promote skin healing  Outcome: Progressing   The patient's goals for the shift include      The clinical goals for the shift include  pain control throughout day shift.

## 2024-09-16 NOTE — TELEPHONE ENCOUNTER
Dr. Childers, [facility] will be starting with this patient as of 9.19.24-9.20.24 duet to staffing. The delay in SOC was okayed by Dr. Adrian Kim today via secure chat. Patient is discharged from LT 6 today.  Thank you.

## 2024-09-20 ENCOUNTER — HOME CARE VISIT (OUTPATIENT)
Dept: HOME HEALTH SERVICES | Facility: HOME HEALTH | Age: 64
End: 2024-09-20
Payer: MEDICAID

## 2024-09-20 VITALS
OXYGEN SATURATION: 96 % | DIASTOLIC BLOOD PRESSURE: 70 MMHG | SYSTOLIC BLOOD PRESSURE: 130 MMHG | TEMPERATURE: 97.4 F | RESPIRATION RATE: 16 BRPM | HEART RATE: 70 BPM

## 2024-09-20 PROCEDURE — G0299 HHS/HOSPICE OF RN EA 15 MIN: HCPCS

## 2024-09-20 ASSESSMENT — ACTIVITIES OF DAILY LIVING (ADL): ENTERING_EXITING_HOME: ONE PERSON

## 2024-09-20 ASSESSMENT — ENCOUNTER SYMPTOMS: DENIES PAIN: 1

## 2024-09-20 NOTE — HOME HEALTH
[facility] HOMECARE SERVICES SDOH  SDOH RESOURCE NAME:elvie  RESOURCE CONTACT:elvie [place]( )  ARJUN( )  [employer]US( )  [employer]( )  DIGITAL/CONNECTION(x )  HOUSING( )  TRANSPORATION(X )  SOCIAL CONNECTIONS( )  STRESS/SUPPORT(x)  UTLILITIES( )  DEPRESSION( )  DRUG/ALCOHOL/TOBACCO ( )  FINANCIAL STRAIN( )  FOOD INSECURITY( )  PASSPORT( )  PHONE/ADP PROGRAM( )  HOUSEHOLD/FURNITURE( )  ASSIT.LIVING( )     OTHER(x ) r/spray    FOLLOW UP: YES (x) NO( )  ADDTIONAL COMMENT:

## 2024-09-21 ENCOUNTER — HOSPITAL ENCOUNTER (EMERGENCY)
Facility: HOSPITAL | Age: 64
Discharge: HOME | End: 2024-09-22
Payer: MEDICAID

## 2024-09-21 VITALS
HEART RATE: 65 BPM | RESPIRATION RATE: 18 BRPM | SYSTOLIC BLOOD PRESSURE: 119 MMHG | DIASTOLIC BLOOD PRESSURE: 61 MMHG | TEMPERATURE: 97.9 F | WEIGHT: 150 LBS | OXYGEN SATURATION: 98 % | BODY MASS INDEX: 23.49 KG/M2

## 2024-09-21 DIAGNOSIS — N48.1 BALANITIS: ICD-10-CM

## 2024-09-21 DIAGNOSIS — Z76.0 MEDICATION REFILL: Primary | ICD-10-CM

## 2024-09-21 PROCEDURE — 99283 EMERGENCY DEPT VISIT LOW MDM: CPT

## 2024-09-21 PROCEDURE — 99284 EMERGENCY DEPT VISIT MOD MDM: CPT | Performed by: PHYSICIAN ASSISTANT

## 2024-09-21 RX ORDER — TRIAMCINOLONE ACETONIDE 1 MG/G
OINTMENT TOPICAL 2 TIMES DAILY
Qty: 30 G | Refills: 0 | Status: SHIPPED | OUTPATIENT
Start: 2024-09-21

## 2024-09-21 RX ORDER — HYDROCORTISONE 25 MG/G
CREAM TOPICAL 2 TIMES DAILY
Qty: 28 G | Refills: 0 | Status: SHIPPED | OUTPATIENT
Start: 2024-09-21

## 2024-09-21 ASSESSMENT — ACTIVITIES OF DAILY LIVING (ADL): OASIS_M1830: 05

## 2024-09-21 ASSESSMENT — ENCOUNTER SYMPTOMS
APPETITE LEVEL: GOOD
LAST BOWEL MOVEMENT: 67103

## 2024-09-22 ENCOUNTER — HOME CARE VISIT (OUTPATIENT)
Dept: HOME HEALTH SERVICES | Facility: HOME HEALTH | Age: 64
End: 2024-09-22
Payer: MEDICAID

## 2024-09-22 NOTE — ED PROVIDER NOTES
Emergency Department Encounter  [facility] EMERGENCY MEDICINE    Patient: Link Macias  MRN: [id number]  : 1960  Date of Evaluation: 2024  ED Provider: Sherice Almanzar PA-C      Chief Complaint       Chief Complaint   Patient presents with    Rash     HPI    Link Macias is a 63 y.o. male who presents to the emergency department presenting for medication refill.  Patient states he is running out of the creams that the dermatologist prescribed him for the rash on his arms and chest.  States that he thinks it is helping.  Only has a few days left of each cream, is using 2 separate creams.  Denies that his rash has gotten any worse or spreading.  Denies any genital rash, rash in the mouth.  Denies rash to his palms.  No recent changes in soaps, lotions, detergents or other topicals.  No medication changes.    ROS:     Review of Systems  14 systems reviewed and otherwise acutely negative except as in the HPI.    Past History     Past Medical History:   Diagnosis Date    Anxiety     Diabetes mellitus (Multi)     HLD (hyperlipidemia)     Hypertension      Past Surgical History:   Procedure Laterality Date    CT ABDOMEN PELVIS ANGIOGRAM W AND/OR WO IV CONTRAST  2022    CT ABDOMEN PELVIS ANGIOGRAM W AND/OR WO IV [facility]AST [facility]    CT ABDOMEN PELVIS ANGIOGRAM W AND/OR WO IV CONTRAST  2023    CT ABDOMEN PELVIS ANGIOGRAM W AND/OR WO IV CONTRAST 2023 [facility]    CT ABDOMEN PELVIS ANGIOGRAM W AND/OR WO IV CONTRAST  9/3/2023    CT ABDOMEN PELVIS ANGIOGRAM W AND/OR WO IV CONTRAST 9/3/2023 [facility]     Social History     Socioeconomic History    Marital status: Single   Tobacco Use    Smoking status: Every Day     Current packs/day: 0.50     Types: Cigarettes    Smokeless tobacco: Never   Substance and Sexual Activity    Alcohol use: Not Currently    Drug use: Never    Sexual activity: Defer     Social Determinants of Health     Financial Resource Strain:  Patient Declined (9/16/2024)    Overall Financial Resource Strain (CARDIA)     Difficulty of Paying Living Expenses: Patient declined   Food Insecurity: Patient Declined (9/15/2024)    Hunger Vital Sign     Worried About Running Out of Food in the Last Year: Patient declined     Ran Out of Food in the Last Year: Patient declined   Transportation Needs: Patient Declined (9/16/2024)    PRAPARE - Transportation     Lack of Transportation (Medical): Patient declined     Lack of Transportation (Non-Medical): Patient declined   Physical Activity: Insufficiently Active (9/15/2024)    Exercise Vital Sign     Days of Exercise per Week: 2 days     Minutes of Exercise per Session: 30 min   Stress: Patient Declined (9/15/2024)    [employer] [place] [employer] - Occupational Stress Questionnaire     Feeling of Stress : Patient declined   Social Connections: Feeling Socially Integrated (9/20/2024)    OASIS : Social Isolation     Frequency of experiencing loneliness or isolation: Rarely   Intimate Partner Violence: Patient Declined (9/15/2024)    Humiliation, Afraid, Rape, and Kick questionnaire     Fear of Current or Ex-Partner: Patient declined     Emotionally Abused: Patient declined     Physically Abused: Patient declined     Sexually Abused: Patient declined   Housing Stability: Patient Declined (9/16/2024)    Housing Stability Vital Sign     Unable to Pay for Housing in the Last Year: Patient declined     Number of Times Moved in the Last Year: 0     Homeless in the Last Year: Patient declined       Medications/Allergies     Current Discharge Medication List        CONTINUE these medications which have NOT CHANGED    Details   acetaminophen (Tylenol) 325 mg tablet Take 2 tablets (650 mg) by mouth every 6 hours if needed for mild pain (1 - 3).  Qty: 30 tablet, Refills: 0    Associated Diagnoses: Rash      aspirin 81 mg EC tablet Take 1 tablet (81 mg) by mouth once daily.  Qty: 30 tablet, Refills: 0     Associated Diagnoses: Chest pain, unspecified type      atorvastatin (Lipitor) 80 mg tablet Take 1 tablet (80 mg) by mouth once daily at bedtime.  Qty: 30 tablet, Refills: 0    Associated Diagnoses: Chest pain, unspecified type      cholecalciferol (Vitamin D-3) 25 MCG (1000 UT) capsule Take 1 capsule (25 mcg) by mouth once daily.      diphenhydrAMINE (BenadryL) 25 mg capsule Take 1 capsule (25 mg) by mouth every 8 hours if needed for itching or allergies for up to 3 days.  Qty: 9 capsule, Refills: 0    Associated Diagnoses: Adverse effect of drug, initial encounter      docusate sodium (Colace) 100 mg capsule Take 1 capsule (100 mg) by mouth 2 times a day as needed for constipation.      DULoxetine (Cymbalta) 60 mg DR capsule Take 1 capsule (60 mg) by mouth once daily.      empagliflozin (Jardiance) 25 mg Take 1 tablet (25 mg) by mouth once daily with breakfast.  Qty: 30 tablet, Refills: 0    Associated Diagnoses: Chest pain, unspecified type      famotidine (Pepcid) 20 mg tablet Take 1 tablet (20 mg) by mouth once daily.      gabapentin (Neurontin) 100 mg capsule Take 1 capsule (100 mg) by mouth 3 times a day.      gentamicin (Garamycin) 0.1 % ointment Apply 1 Application topically once daily.      hydrocortisone (Anusol-HC) 2.5 % rectal cream Insert 1 Application into the rectum 2 times a day as needed for hemorrhoids.      !! hydrocortisone 2.5 % cream Apply topically 2 times a day. To rash on genitals. 14 days on, 7 days off. Repeat as needed for rash.  Qty: 30 g, Refills: 11    Associated Diagnoses: Prurigo nodularis      hydrOXYzine HCL (Atarax) 25 mg tablet Take 1 tablet (25 mg) by mouth once daily as needed for itching or anxiety.      losartan (Cozaar) 100 mg tablet Take 1 tablet (100 mg) by mouth once daily.  Qty: 30 tablet, Refills: 0    Associated Diagnoses: Chest pain, unspecified type      melatonin 3 mg tablet Take 1 tablet (3 mg) by mouth once daily at bedtime.  Qty: 30 tablet, Refills: 0     Associated Diagnoses: Chest pain, unspecified type      metFORMIN (Glucophage) 500 mg tablet Take 1 tablet (500 mg) by mouth 2 times a day with meals.  Qty: 60 tablet, Refills: 0    Associated Diagnoses: Chest pain, unspecified type      methocarbamol (Robaxin) 500 mg tablet Take 1 tablet (500 mg) by mouth 2 times a day as needed for muscle spasms.      metoprolol tartrate (Lopressor) 25 mg tablet Take 1 tablet (25 mg) by mouth 2 times a day.  Qty: 60 tablet, Refills: 5    Associated Diagnoses: Chest pain, unspecified type      naproxen (Naprosyn) 375 mg tablet Take 1 tablet (375 mg) by mouth every 12 hours if needed for mild pain (1 - 3).      nitroglycerin (Nitrostat) 0.4 mg SL tablet Place 1 tablet (0.4 mg) under the tongue every 5 minutes if needed for chest pain. Up to 3 times. IF NO RELIEF CALL 911      omega-3 acid ethyl esters (Lovaza) 1 gram capsule Take 2 capsules (2 g) by mouth once daily.      OXcarbazepine (Trileptal) 150 mg tablet Take 1 tablet (150 mg) by mouth 2 times a day.  Qty: 60 tablet, Refills: 0    Associated Diagnoses: Chest pain, unspecified type      pantoprazole (ProtoNix) 20 mg EC tablet Take 1 tablet (20 mg) by mouth once daily in the morning. Take before meals. Do not crush, chew, or split. Patient takes as needed.      pregabalin (Lyrica) 75 mg capsule Take 1 capsule (75 mg) by mouth 2 times a day.      triamcinolone (Kenalog) 0.1 % cream Apply topically 2 times a day. To rash on trunk and extremities. 14 days on, 7 days off. Repeat as needed for rash.  Qty: 80 g, Refills: 11    Associated Diagnoses: Prurigo nodularis       !! - Potential duplicate medications found. Please discuss with provider.        Allergies   Allergen Reactions    Penicillins Unknown        Physical Exam       ED Triage Vitals [09/21/24 2222]   Temperature Heart Rate Respirations BP   36.6 °C (97.9 °F) 65 18 119/61      Pulse Ox Temp src Heart Rate Source Patient Position   98 % -- -- --      BP Location FiO2 (%)      -- --         Physical Exam    Physical Exam:     VS: As documented in the triage note and EMR flowsheet from this visit were reviewed.    Appearance: Alert, oriented, cooperative, in no acute distress. Well nourished & well hydrated.    Skin:  Warm, intact and dry. Scattered papular lesions to bilateral upper extremities and chest - on both flexor and extensor surfaces. No significant erythema, edema, increased warmth. Spares interdigital webspaces of hands.     ENT: Hearing grossly intact. Nares patent, mucus membranes moist. Pharynx clear, uvula midline and non-edematous.     Neck: Supple, without lymphadenopathy.     Pulmonary: Clear bilaterally with good chest wall excursion. No rales, rhonchi or wheezing. No accessory muscle use or stridor.    Cardiac: Normal S1, S2.    Musculoskeletal: Spontaneously moving bilateral upper extremities. Bilateral upper extremities warm and well-perfused, capillary refill less than 2 seconds. Pulses full and equal.    Neurological:  Cranial nerves II through XII are grossly intact.    Psychiatric: Appropriate mood and affect. Kempt appearance.    Diagnostics   Not applicable      ED Course   Visit Vitals  /61   Pulse 65   Temp 36.6 °C (97.9 °F)   Resp 18   Wt 68 kg (150 lb)   SpO2 98%   BMI 23.49 kg/m²   Smoking Status Every Day   BSA 1.79 m²     Medications - No data to display    Medical Decision Making     Diagnoses as of 09/21/24 2317   Medication refill   Chart reviewed - chronic rash being treated by derm with topical kenalog and hydrocortisone cream. Both are refilled, sent to his preferred pharmacy. Followup with his dermatologist for further care.    Final Impression      Diagnoses as of 09/21/24 2317   Medication refill         DISPOSITION  Disposition: discharge  Patient condition is: Stable    Comment: Please note this report has been produced using speech recognition software and may contain errors related to that system including errors in grammar,  punctuation, and spelling, as well as words and phrases that may be inappropriate.  If there are any questions or concerns please feel free to contact the dictating provider for clarification.    ROYA Holland PA-C  09/21/24 [id number]

## 2024-09-22 NOTE — ED TRIAGE NOTES
Pt to ED c/o rash on chest, bilateral shoulders and groin area, painful and itchy x 4 days.Pt states he is out of the cream that he was previously given for same complaint.

## 2024-09-23 ENCOUNTER — HOME CARE VISIT (OUTPATIENT)
Dept: HOME HEALTH SERVICES | Facility: HOME HEALTH | Age: 64
End: 2024-09-23
Payer: MEDICAID

## 2024-09-24 ENCOUNTER — HOME CARE VISIT (OUTPATIENT)
Dept: HOME HEALTH SERVICES | Facility: HOME HEALTH | Age: 64
End: 2024-09-24
Payer: MEDICAID

## 2024-09-24 NOTE — HOME HEALTH
[facility] SDOH  [facility]  RESOURCE CONTACT:Carmelita Escobedojosseline LOMBARDI(x )  WRAAA( )  [facility]  [facility]( )  DIGITAL/CONNECTION( )  HOUSING( )  TRANSPORATION( )  SOCIAL CONNECTIONS(x )  STRESS/SUPPORT( )  UTLILITIES( )  DEPRESSION( )  DRUG/ALCOHOL/TOBACCO ( )  FINANCIAL STRAIN(x )  FOOD INSECURITY( x)  PASSPORT( )  PHONE/ADP PROGRAM( )  HOUSEHOLD/FURNITURE( )  ASSIT.LIVING( )     OTHER(x )Patient received a donation from [email or web address] for [employer]. Spoke with Patient's PM about assiting with  for the patients unit.  PM will follow up with me once completed,Scheduled patient transporation to [facility] to get state id to received food from  [facility] [employer] followup scheduled for friday.    FOLLOW UP: YES (x ) NO( )  ADDTIONAL COMMENT:

## 2024-09-25 ENCOUNTER — HOSPITAL ENCOUNTER (EMERGENCY)
Facility: HOSPITAL | Age: 64
Discharge: HOME | End: 2024-09-26
Attending: STUDENT IN AN ORGANIZED HEALTH CARE EDUCATION/TRAINING PROGRAM
Payer: MEDICAID

## 2024-09-25 ENCOUNTER — APPOINTMENT (OUTPATIENT)
Dept: RADIOLOGY | Facility: HOSPITAL | Age: 64
End: 2024-09-25
Payer: MEDICAID

## 2024-09-25 ENCOUNTER — HOME CARE VISIT (OUTPATIENT)
Dept: HOME HEALTH SERVICES | Facility: HOME HEALTH | Age: 64
End: 2024-09-25
Payer: MEDICAID

## 2024-09-25 DIAGNOSIS — K62.89 RECTAL PAIN: Primary | ICD-10-CM

## 2024-09-25 LAB
ALBUMIN SERPL BCP-MCNC: 4.9 G/DL (ref 3.4–5)
ALP SERPL-CCNC: 116 U/L (ref 33–136)
ALT SERPL W P-5'-P-CCNC: 14 U/L (ref 10–52)
ANION GAP SERPL CALC-SCNC: 17 MMOL/L (ref 10–20)
APPEARANCE UR: CLEAR
AST SERPL W P-5'-P-CCNC: 21 U/L (ref 9–39)
BASOPHILS # BLD AUTO: 0.04 X10*3/UL (ref 0–0.1)
BASOPHILS NFR BLD AUTO: 0.7 %
BILIRUB SERPL-MCNC: 0.4 MG/DL (ref 0–1.2)
BILIRUB UR STRIP.AUTO-MCNC: NEGATIVE MG/DL
BUN SERPL-MCNC: 28 MG/DL (ref 6–23)
CALCIUM SERPL-MCNC: 9.9 MG/DL (ref 8.6–10.6)
CHLORIDE SERPL-SCNC: 101 MMOL/L (ref 98–107)
CO2 SERPL-SCNC: 25 MMOL/L (ref 21–32)
COLOR UR: ABNORMAL
CREAT SERPL-MCNC: 0.83 MG/DL (ref 0.5–1.3)
CRP SERPL-MCNC: 1.78 MG/DL
EGFRCR SERPLBLD CKD-EPI 2021: >90 ML/MIN/1.73M*2
EOSINOPHIL # BLD AUTO: 0.04 X10*3/UL (ref 0–0.7)
EOSINOPHIL NFR BLD AUTO: 0.7 %
ERYTHROCYTE [DISTWIDTH] IN BLOOD BY AUTOMATED COUNT: 14.9 % (ref 11.5–14.5)
GLUCOSE SERPL-MCNC: 89 MG/DL (ref 74–99)
GLUCOSE UR STRIP.AUTO-MCNC: ABNORMAL MG/DL
HCT VFR BLD AUTO: 49.4 % (ref 41–52)
HGB BLD-MCNC: 15.4 G/DL (ref 13.5–17.5)
IMM GRANULOCYTES # BLD AUTO: 0.02 X10*3/UL (ref 0–0.7)
IMM GRANULOCYTES NFR BLD AUTO: 0.3 % (ref 0–0.9)
KETONES UR STRIP.AUTO-MCNC: NEGATIVE MG/DL
LACTATE SERPL-SCNC: 1.4 MMOL/L (ref 0.4–2)
LACTATE SERPL-SCNC: 2.4 MMOL/L (ref 0.4–2)
LEUKOCYTE ESTERASE UR QL STRIP.AUTO: NEGATIVE
LYMPHOCYTES # BLD AUTO: 1.29 X10*3/UL (ref 1.2–4.8)
LYMPHOCYTES NFR BLD AUTO: 21.9 %
MAGNESIUM SERPL-MCNC: 1.89 MG/DL (ref 1.6–2.4)
MCH RBC QN AUTO: 24.8 PG (ref 26–34)
MCHC RBC AUTO-ENTMCNC: 31.2 G/DL (ref 32–36)
MCV RBC AUTO: 79 FL (ref 80–100)
MONOCYTES # BLD AUTO: 0.55 X10*3/UL (ref 0.1–1)
MONOCYTES NFR BLD AUTO: 9.3 %
NEUTROPHILS # BLD AUTO: 3.96 X10*3/UL (ref 1.2–7.7)
NEUTROPHILS NFR BLD AUTO: 67.1 %
NITRITE UR QL STRIP.AUTO: NEGATIVE
NRBC BLD-RTO: 0 /100 WBCS (ref 0–0)
PH UR STRIP.AUTO: 6 [PH]
PLATELET # BLD AUTO: 241 X10*3/UL (ref 150–450)
POTASSIUM SERPL-SCNC: 4.9 MMOL/L (ref 3.5–5.3)
PROT SERPL-MCNC: 7.7 G/DL (ref 6.4–8.2)
PROT UR STRIP.AUTO-MCNC: NEGATIVE MG/DL
RBC # BLD AUTO: 6.22 X10*6/UL (ref 4.5–5.9)
RBC # UR STRIP.AUTO: NEGATIVE /UL
SODIUM SERPL-SCNC: 138 MMOL/L (ref 136–145)
SP GR UR STRIP.AUTO: 1.04
UROBILINOGEN UR STRIP.AUTO-MCNC: NORMAL MG/DL
WBC # BLD AUTO: 5.9 X10*3/UL (ref 4.4–11.3)

## 2024-09-25 PROCEDURE — 83605 ASSAY OF LACTIC ACID: CPT

## 2024-09-25 PROCEDURE — 99284 EMERGENCY DEPT VISIT MOD MDM: CPT | Mod: 25

## 2024-09-25 PROCEDURE — 86140 C-REACTIVE PROTEIN: CPT

## 2024-09-25 PROCEDURE — 2500000004 HC RX 250 GENERAL PHARMACY W/ HCPCS (ALT 636 FOR OP/ED): Mod: SE

## 2024-09-25 PROCEDURE — 99285 EMERGENCY DEPT VISIT HI MDM: CPT | Performed by: STUDENT IN AN ORGANIZED HEALTH CARE EDUCATION/TRAINING PROGRAM

## 2024-09-25 PROCEDURE — 74177 CT ABD & PELVIS W/CONTRAST: CPT

## 2024-09-25 PROCEDURE — 36415 COLL VENOUS BLD VENIPUNCTURE: CPT

## 2024-09-25 PROCEDURE — 81003 URINALYSIS AUTO W/O SCOPE: CPT

## 2024-09-25 PROCEDURE — 85025 COMPLETE CBC W/AUTO DIFF WBC: CPT

## 2024-09-25 PROCEDURE — 83735 ASSAY OF MAGNESIUM: CPT

## 2024-09-25 PROCEDURE — 2550000001 HC RX 255 CONTRASTS: Mod: SE | Performed by: STUDENT IN AN ORGANIZED HEALTH CARE EDUCATION/TRAINING PROGRAM

## 2024-09-25 PROCEDURE — 96374 THER/PROPH/DIAG INJ IV PUSH: CPT | Mod: 59

## 2024-09-25 PROCEDURE — 80053 COMPREHEN METABOLIC PANEL: CPT

## 2024-09-25 PROCEDURE — 96361 HYDRATE IV INFUSION ADD-ON: CPT

## 2024-09-25 PROCEDURE — 74177 CT ABD & PELVIS W/CONTRAST: CPT | Performed by: RADIOLOGY

## 2024-09-25 RX ORDER — MORPHINE SULFATE 4 MG/ML
2 INJECTION INTRAVENOUS ONCE
Status: COMPLETED | OUTPATIENT
Start: 2024-09-25 | End: 2024-09-25

## 2024-09-25 RX ADMIN — IOHEXOL 60 ML: 350 INJECTION, SOLUTION INTRAVENOUS at 17:05

## 2024-09-25 RX ADMIN — MORPHINE SULFATE 2 MG: 4 INJECTION INTRAVENOUS at 16:16

## 2024-09-25 RX ADMIN — SODIUM CHLORIDE, POTASSIUM CHLORIDE, SODIUM LACTATE AND CALCIUM CHLORIDE 1000 ML: 600; 310; 30; 20 INJECTION, SOLUTION INTRAVENOUS at 16:16

## 2024-09-25 ASSESSMENT — LIFESTYLE VARIABLES
TOTAL SCORE: 0
EVER HAD A DRINK FIRST THING IN THE MORNING TO STEADY YOUR NERVES TO GET RID OF A HANGOVER: NO
HAVE YOU EVER FELT YOU SHOULD CUT DOWN ON YOUR DRINKING: NO
EVER FELT BAD OR GUILTY ABOUT YOUR DRINKING: NO
HAVE PEOPLE ANNOYED YOU BY CRITICIZING YOUR DRINKING: NO

## 2024-09-25 ASSESSMENT — PAIN SCALES - GENERAL: PAINLEVEL_OUTOF10: 0 - NO PAIN

## 2024-09-25 ASSESSMENT — PAIN - FUNCTIONAL ASSESSMENT: PAIN_FUNCTIONAL_ASSESSMENT: 0-10

## 2024-09-25 NOTE — PROGRESS NOTES
"Link Macias is a 63 y.o. male     Assessment/Plan   ARIANNE asked by RN to talk with Pt. Pt explained MD spoke with his CHW from [facility] who wants to help him obtain a new ID so he can be referred to [employer]. ARIANNE called and spoke with CHW Austin Kristabertha [phone]. She confirmed Pt had no food in residence yesterday. She also stated she went to his leasing office about pest control as there are active roaches in residence as observed by CHW and [facility] RN. Pt was given \"zero\" from [facility] that was a donated pest control item. As off today, pest control not yet sent by leasing office according to Pt. She asked for a referral for food for life anticipating food could be brought to him prior to discharge. Referral made via MD however, ARIANNE explained if Pt were to discharge tonight, ED food would be provided as food for life is not open.   Pt confirmed he has an aid daily for three hours and that his meal provider \"[employer]\" was contacted on Friday by himself as he said his food was not at his residence. Pt said his meals were not delivered again nor had he informed his [facility] waiver coordinator of lack of food.   ARIANNE spoke with Pt and obtained [facility] waiver coordinator's name and number: Stacie Engle [phone]. Pt referred to her as \"Es\" as he was unable to recall her name. ARIANNE and Stacie spoke with Pt together by phone. With further questioning, Pt explained he had no meals delivered as of last Friday stating \"if I had to do without, I would.\" Pt explained he had no foodstamps to purchase more food. He said he lost the key to his mail box resulting in him not obtaining his renewal notice from [employer]. Pt also acknowledged he has no ID and that W offered to help replace his ID. Pt therefore had no alternative method to purchase food due to lack of funds. ARIANNE asked Pt when he last ate. Pt acknowledged he obtained from his aid and her niece. These were the niece's meals. He said they gave him several " frozen meals and that he ate his last meal today prior to coming to ED. Stacie and SW voiced concern to PT that he seems unable to care for himself. She asked to explore with him potential for FDC.  Solutions agreed on based on this discussion withall parties involved were as follows:  Stacie to contact [employer] tomorrow.  CHW to follow Pt for replacement ID.  This SW reported to APS self-neglect. [id number] report and will provide to APS as this was afterhours/kids line.  ED team instructed to send Pt home with minimum four frozen meals should he discharge tonight or tomorrow.   SW messaged [facility] Alka Cardenas and Austin Lloyd via secure chat to inform them of pending discharge tonight and plan to address food, safety.     SILAS Moreno

## 2024-09-25 NOTE — ED PROVIDER NOTES
EMERGENCY DEPARTMENT ENCOUNTER      Pt Name: Link Macias  MRN: [id number]  Birthdate 1960  Date of evaluation: 9/25/2024  Provider: Mehrdad Acosta MD    CHIEF COMPLAINT       Chief Complaint   Patient presents with    Rectal Pain     HISTORY OF PRESENT ILLNESS    HPI  63-year-old male presents emergency department with chief complaint of rectal pain.  Patient has a past medical history of hypertension and peripheral vascular disease requiring bilateral amputation of his lower extremities.  Patient indicates that yesterday he had a bowel movement and had severe pain in the perianus and along his lower back.  He denies any fever nausea vomiting or any other concerning infectious symptoms.  His main concern and presentation emergency department was exclusively pain to his anus.  Nursing Notes were reviewed.    PAST MEDICAL HISTORY     Past Medical History:   Diagnosis Date    Anxiety     Diabetes mellitus (Multi)     HLD (hyperlipidemia)     Hypertension          SURGICAL HISTORY       Past Surgical History:   Procedure Laterality Date    CT ABDOMEN PELVIS ANGIOGRAM W AND/OR WO IV CONTRAST  12/16/2022    CT ABDOMEN PELVIS ANGIOGRAM W AND/OR WO IV CONTRAST 12/16/2022 [facility]    CT ABDOMEN PELVIS ANGIOGRAM W AND/OR WO IV CONTRAST  8/18/2023    CT ABDOMEN PELVIS ANGIOGRAM W AND/OR WO IV CONTRAST 8/18/2023 [facility] CT    CT ABDOMEN PELVIS ANGIOGRAM W AND/OR WO IV CONTRAST  9/3/2023    CT ABDOMEN PELVIS ANGIOGRAM W AND/OR WO IV CONTRAST 9/3/2023 [facility] CT         CURRENT MEDICATIONS       Previous Medications    ACETAMINOPHEN (TYLENOL) 325 MG TABLET    Take 2 tablets (650 mg) by mouth every 6 hours if needed for mild pain (1 - 3).    ASPIRIN 81 MG EC TABLET    Take 1 tablet (81 mg) by mouth once daily.    ATORVASTATIN (LIPITOR) 80 MG TABLET    Take 1 tablet (80 mg) by mouth once daily at bedtime.    CHOLECALCIFEROL (VITAMIN D-3) 25 MCG (1000 UT) CAPSULE    Take 1 capsule (25 mcg) by mouth once daily.     DIPHENHYDRAMINE (BENADRYL) 25 MG CAPSULE    Take 1 capsule (25 mg) by mouth every 8 hours if needed for itching or allergies for up to 3 days.    DOCUSATE SODIUM (COLACE) 100 MG CAPSULE    Take 1 capsule (100 mg) by mouth 2 times a day as needed for constipation.    DULOXETINE (CYMBALTA) 60 MG DR CAPSULE    Take 1 capsule (60 mg) by mouth once daily.    EMPAGLIFLOZIN (JARDIANCE) 25 MG    Take 1 tablet (25 mg) by mouth once daily with breakfast.    FAMOTIDINE (PEPCID) 20 MG TABLET    Take 1 tablet (20 mg) by mouth once daily.    GABAPENTIN (NEURONTIN) 100 MG CAPSULE    Take 1 capsule (100 mg) by mouth 3 times a day.    GENTAMICIN (GARAMYCIN) 0.1 % OINTMENT    Apply 1 Application topically once daily.    HYDROCORTISONE (ANUSOL-HC) 2.5 % RECTAL CREAM    Insert 1 Application into the rectum 2 times a day as needed for hemorrhoids.    HYDROCORTISONE 2.5 % CREAM    Apply topically 2 times a day. To rash on genitals. 14 days on, 7 days off. Repeat as needed for rash.    HYDROCORTISONE 2.5 % CREAM    Apply topically 2 times a day.    HYDROXYZINE HCL (ATARAX) 25 MG TABLET    Take 1 tablet (25 mg) by mouth once daily as needed for itching or anxiety.    LOSARTAN (COZAAR) 100 MG TABLET    Take 1 tablet (100 mg) by mouth once daily.    MELATONIN 3 MG TABLET    Take 1 tablet (3 mg) by mouth once daily at bedtime.    METFORMIN (GLUCOPHAGE) 500 MG TABLET    Take 1 tablet (500 mg) by mouth 2 times a day with meals.    METHOCARBAMOL (ROBAXIN) 500 MG TABLET    Take 1 tablet (500 mg) by mouth 2 times a day as needed for muscle spasms.    METOPROLOL TARTRATE (LOPRESSOR) 25 MG TABLET    Take 1 tablet (25 mg) by mouth 2 times a day.    NAPROXEN (NAPROSYN) 375 MG TABLET    Take 1 tablet (375 mg) by mouth every 12 hours if needed for mild pain (1 - 3).    NITROGLYCERIN (NITROSTAT) 0.4 MG SL TABLET    Place 1 tablet (0.4 mg) under the tongue every 5 minutes if needed for chest pain. Up to 3 times. IF NO RELIEF CALL 911    OMEGA-3 ACID ETHYL  ESTERS (LOVAZA) 1 GRAM CAPSULE    Take 2 capsules (2 g) by mouth once daily.    OXCARBAZEPINE (TRILEPTAL) 150 MG TABLET    Take 1 tablet (150 mg) by mouth 2 times a day.    PANTOPRAZOLE (PROTONIX) 20 MG EC TABLET    Take 1 tablet (20 mg) by mouth once daily in the morning. Take before meals. Do not crush, chew, or split. Patient takes as needed.    PREGABALIN (LYRICA) 75 MG CAPSULE    Take 1 capsule (75 mg) by mouth 2 times a day.    TRIAMCINOLONE (KENALOG) 0.1 % CREAM    Apply topically 2 times a day. To rash on trunk and extremities. 14 days on, 7 days off. Repeat as needed for rash.    TRIAMCINOLONE (KENALOG) 0.1 % OINTMENT    Apply topically 2 times a day.       ALLERGIES     Penicillins    FAMILY HISTORY     No family history on file.       SOCIAL HISTORY       Social History     Socioeconomic History    Marital status: Single   Tobacco Use    Smoking status: Every Day     Current packs/day: 0.50     Types: Cigarettes    Smokeless tobacco: Never   Substance and Sexual Activity    Alcohol use: Not Currently    Drug use: Never    Sexual activity: Defer     Social Determinants of Health     Financial Resource Strain: Patient Declined (9/16/2024)    Overall Financial Resource Strain (CARDIA)     Difficulty of Paying Living Expenses: Patient declined   Food Insecurity: Patient Declined (9/15/2024)    Hunger Vital Sign     Worried About Running Out of Food in the Last Year: Patient declined     Ran Out of Food in the Last Year: Patient declined   Transportation Needs: No Transportation Needs (9/20/2024)    OASIS : Transportation     Lack of Transportation (Medical): No     Lack of Transportation (Non-Medical): No     Patient Unable or Declines to Respond: No   Physical Activity: Insufficiently Active (9/15/2024)    Exercise Vital Sign     Days of Exercise per Week: 2 days     Minutes of Exercise per Session: 30 min   Stress: Patient Declined (9/15/2024)    [employer] - Occupational  Stress Questionnaire     Feeling of Stress : Patient declined   Social Connections: Feeling Socially Integrated (9/20/2024)    OASIS : Social Isolation     Frequency of experiencing loneliness or isolation: Rarely   Intimate Partner Violence: Patient Declined (9/15/2024)    Humiliation, Afraid, Rape, and Kick questionnaire     Fear of Current or Ex-Partner: Patient declined     Emotionally Abused: Patient declined     Physically Abused: Patient declined     Sexually Abused: Patient declined   Housing Stability: Patient Declined (9/16/2024)    Housing Stability Vital Sign     Unable to Pay for Housing in the Last Year: Patient declined     Number of Times Moved in the Last Year: 0     Homeless in the Last Year: Patient declined       SCREENINGS                        PHYSICAL EXAM    (up to 7 for level 4, 8 or more for level 5)     ED Triage Vitals [09/25/24 1502]   Temperature Heart Rate Respirations BP   36.8 °C (98.2 °F) 61 16 136/85      Pulse Ox Temp Source Heart Rate Source Patient Position   96 % Temporal Monitor Lying      BP Location FiO2 (%)     Right arm --       Physical Exam  Vitals and nursing note reviewed.   Constitutional:       General: He is not in acute distress.     Appearance: He is well-developed.   HENT:      Head: Normocephalic and atraumatic.   Eyes:      Conjunctiva/sclera: Conjunctivae normal.   Cardiovascular:      Rate and Rhythm: Normal rate and regular rhythm.      Heart sounds: No murmur heard.  Pulmonary:      Effort: Pulmonary effort is normal. No respiratory distress.      Breath sounds: Normal breath sounds.   Abdominal:      Palpations: Abdomen is soft.      Tenderness: There is no abdominal tenderness.   Genitourinary:     Comments: Patient appears to have erythema surrounding the anus migrating caudally.  There is not appear to be any fluctuance or crepitus on palpitation.  No active fistula or purulent discharge noted.  Musculoskeletal:         General: No swelling.       Cervical back: Neck supple.   Skin:     General: Skin is warm and dry.      Capillary Refill: Capillary refill takes less than 2 seconds.   Neurological:      Mental Status: He is alert.   Psychiatric:         Mood and Affect: Mood normal.          DIAGNOSTIC RESULTS     LABS:  Labs Reviewed   URINALYSIS WITH REFLEX CULTURE AND MICROSCOPIC    Narrative:     The following orders were created for panel order Urinalysis with Reflex Culture and Microscopic.  Procedure                               Abnormality         Status                     ---------                               -----------         ------                     Urinalysis with Reflex C...[[id number]]                                                 Extra Urine Gray Tube[[id number]]                                                         Please view results for these tests on the individual orders.   CBC WITH AUTO DIFFERENTIAL   MAGNESIUM   COMPREHENSIVE METABOLIC PANEL   LACTATE   C-REACTIVE PROTEIN   URINALYSIS WITH REFLEX CULTURE AND MICROSCOPIC   EXTRA URINE GRAY TUBE       All other labs were within normal range or not returned as of this dictation.    Imaging  CT abdomen pelvis w IV contrast    (Results Pending)        Procedures  Procedures     EMERGENCY DEPARTMENT COURSE/MDM:   Medical Decision Making  63-year-old male presents emergency department with chief complaint of rectal pain.  Medical management treatment emergency department will be pain control with morphine as well as labs to evaluate for possible infectious source of this patient's pain.  Will also be conducting a CT scan of the abdomen and pelvis to rule out any deep space infection.  CT scan was negative.  Patient's initial lactate was elevated at 2.4 we have provided fluids and give the patient pain management.   indicates that the patient was out of fluid we have placed a referral on his behalf for community out reach for resources.  We will also be sending the  patient home with some frozen meals from the emergency department.  Patient will most likely be discharged pending his urinalysis and repeat lactate.  Patient will be signed out to the night resident the pain on his bottom is most likely due to the frequency of his sitting.  The patient is a bilateral lower extremity amputee is most likely chronically sitting and unlikely to get up causing irritation to that area.    ED Course as of 09/25/24 2007   Wed Sep 25, 2024   1655 Please provide the patient with microwavable meals to go home with discharge [DS]   1907 CT abdomen pelvis w IV contrast [DS]      ED Course User Index  [DS] Mehrdad Acosta MD         Diagnoses as of 09/25/24 2007   Rectal pain        Patient and or family in agreement and understanding of treatment plan.  All questions answered.      I reviewed the case with the attending ED physician. The attending ED physician agrees with the plan. Patient and/or patient´s representative was counseled regarding labs, imaging, likely diagnosis, and plan. All questions were answered.    ED Medications administered this visit:    Medications   lactated Ringer's bolus 1,000 mL (1,000 mL intravenous New Bag 9/25/24 1616)   morphine injection 2 mg (2 mg intravenous Given 9/25/24 1616)       New Prescriptions from this visit:    New Prescriptions    No medications on file       Follow-up:  No follow-up provider specified.      Final Impression:   1. Rectal pain          (Please note that portions of this note were completed with a voice recognition program.  Efforts were made to edit the dictations but occasionally words are mis-transcribed.)     Mehrdad Acosta MD  Resident  09/25/24 2009

## 2024-09-25 NOTE — ED TRIAGE NOTES
PT arrived via EMS. C/O Rectal/buttock pain x2 days. Pt denies seeing any bleeding, had BM x2 today. VSS, A/O x4, Bliateral AKA.

## 2024-09-26 ENCOUNTER — HOME CARE VISIT (OUTPATIENT)
Dept: HOME HEALTH SERVICES | Facility: HOME HEALTH | Age: 64
End: 2024-09-26
Payer: MEDICAID

## 2024-09-26 VITALS
TEMPERATURE: 98.2 F | DIASTOLIC BLOOD PRESSURE: 64 MMHG | SYSTOLIC BLOOD PRESSURE: 153 MMHG | BODY MASS INDEX: 23.49 KG/M2 | HEART RATE: 64 BPM | OXYGEN SATURATION: 97 % | RESPIRATION RATE: 16 BRPM | WEIGHT: 150 LBS

## 2024-09-26 LAB — HOLD SPECIMEN: NORMAL

## 2024-09-26 PROCEDURE — G0299 HHS/HOSPICE OF RN EA 15 MIN: HCPCS

## 2024-09-26 NOTE — HOME HEALTH
[facility] HOMECARE SERVICES [facility]  SDOH RESOURCE NAME:Alaina  RESOURCE CONTACT:Alaina [place]( )  [employer]( )  [employer]( )  [employer](x )  DIGITAL/CONNECTION( )  HOUSING( )  TRANSPORATION( )  SOCIAL CONNECTIONS( x)  STRESS/SUPPORT( )  UTLILITIES( )  DEPRESSION( )  DRUG/ALCOHOL/TOBACCO ( )  FINANCIAL STRAIN( )  FOOD INSECURITY(x )  PASSPORT( )  PHONE/ADP PROGRAM( )  HOUSEHOLD/FURNITURE( )  ASSIT.LIVING( )     OTHER(x )    FOLLOW UP: YES ( ) NO(x )  ADDTIONAL COMMENT:Patient was experiencing a severe food insecurity due his snap benefits renewal not being completed due to patient being in the hospital during his renewal time for benefits/ Resources Received:state id and a referral through [employer] for the [employer] to help him with the renewal process

## 2024-09-28 ENCOUNTER — HOSPITAL ENCOUNTER (EMERGENCY)
Facility: HOSPITAL | Age: 64
Discharge: HOME | End: 2024-09-28
Attending: EMERGENCY MEDICINE
Payer: MEDICAID

## 2024-09-28 VITALS
OXYGEN SATURATION: 98 % | SYSTOLIC BLOOD PRESSURE: 119 MMHG | HEIGHT: 67 IN | RESPIRATION RATE: 15 BRPM | TEMPERATURE: 98.1 F | DIASTOLIC BLOOD PRESSURE: 50 MMHG | HEART RATE: 51 BPM | WEIGHT: 150 LBS | BODY MASS INDEX: 23.54 KG/M2

## 2024-09-28 DIAGNOSIS — M25.551 RIGHT HIP PAIN: Primary | ICD-10-CM

## 2024-09-28 PROCEDURE — 96372 THER/PROPH/DIAG INJ SC/IM: CPT

## 2024-09-28 PROCEDURE — 2500000004 HC RX 250 GENERAL PHARMACY W/ HCPCS (ALT 636 FOR OP/ED): Mod: SE

## 2024-09-28 PROCEDURE — 99283 EMERGENCY DEPT VISIT LOW MDM: CPT

## 2024-09-28 PROCEDURE — 2500000001 HC RX 250 WO HCPCS SELF ADMINISTERED DRUGS (ALT 637 FOR MEDICARE OP): Mod: SE

## 2024-09-28 PROCEDURE — 99284 EMERGENCY DEPT VISIT MOD MDM: CPT | Performed by: EMERGENCY MEDICINE

## 2024-09-28 RX ORDER — KETOROLAC TROMETHAMINE 15 MG/ML
15 INJECTION, SOLUTION INTRAMUSCULAR; INTRAVENOUS ONCE
Status: COMPLETED | OUTPATIENT
Start: 2024-09-28 | End: 2024-09-28

## 2024-09-28 RX ORDER — LIDOCAINE 560 MG/1
1 PATCH PERCUTANEOUS; TOPICAL; TRANSDERMAL DAILY
Status: DISCONTINUED | OUTPATIENT
Start: 2024-09-28 | End: 2024-09-28 | Stop reason: HOSPADM

## 2024-09-28 RX ORDER — ACETAMINOPHEN 500 MG
1000 TABLET ORAL EVERY 6 HOURS PRN
Qty: 30 TABLET | Refills: 0 | Status: SHIPPED | OUTPATIENT
Start: 2024-09-28 | End: 2024-10-06

## 2024-09-28 RX ORDER — LIDOCAINE 560 MG/1
1 PATCH PERCUTANEOUS; TOPICAL; TRANSDERMAL DAILY
Qty: 4 PATCH | Refills: 0 | Status: SHIPPED | OUTPATIENT
Start: 2024-09-28 | End: 2024-10-06

## 2024-09-28 RX ORDER — IBUPROFEN 600 MG/1
600 TABLET ORAL EVERY 6 HOURS PRN
Qty: 30 TABLET | Refills: 0 | Status: SHIPPED | OUTPATIENT
Start: 2024-09-28 | End: 2024-10-06

## 2024-09-28 RX ORDER — ACETAMINOPHEN 325 MG/1
975 TABLET ORAL ONCE
Status: COMPLETED | OUTPATIENT
Start: 2024-09-28 | End: 2024-09-28

## 2024-09-28 RX ORDER — CHLORHEXIDINE GLUCONATE 40 MG/ML
SOLUTION TOPICAL DAILY PRN
Qty: 473 ML | Refills: 0 | Status: SHIPPED | OUTPATIENT
Start: 2024-09-28

## 2024-09-28 RX ADMIN — ACETAMINOPHEN 975 MG: 325 TABLET ORAL at 05:13

## 2024-09-28 RX ADMIN — KETOROLAC TROMETHAMINE 15 MG: 15 INJECTION, SOLUTION INTRAMUSCULAR; INTRAVENOUS at 03:06

## 2024-09-28 ASSESSMENT — LIFESTYLE VARIABLES
EVER HAD A DRINK FIRST THING IN THE MORNING TO STEADY YOUR NERVES TO GET RID OF A HANGOVER: NO
HAVE PEOPLE ANNOYED YOU BY CRITICIZING YOUR DRINKING: NO
EVER FELT BAD OR GUILTY ABOUT YOUR DRINKING: NO
TOTAL SCORE: 0
HAVE YOU EVER FELT YOU SHOULD CUT DOWN ON YOUR DRINKING: NO

## 2024-09-28 ASSESSMENT — PAIN - FUNCTIONAL ASSESSMENT
PAIN_FUNCTIONAL_ASSESSMENT: 0-10
PAIN_FUNCTIONAL_ASSESSMENT: 0-10

## 2024-09-28 ASSESSMENT — PAIN SCALES - GENERAL
PAINLEVEL_OUTOF10: 7
PAINLEVEL_OUTOF10: 10 - WORST POSSIBLE PAIN

## 2024-09-28 ASSESSMENT — PAIN DESCRIPTION - ORIENTATION: ORIENTATION: RIGHT

## 2024-09-28 ASSESSMENT — PAIN DESCRIPTION - PAIN TYPE: TYPE: ACUTE PAIN

## 2024-09-28 ASSESSMENT — PAIN DESCRIPTION - DESCRIPTORS: DESCRIPTORS: THROBBING

## 2024-09-28 ASSESSMENT — PAIN DESCRIPTION - LOCATION: LOCATION: HIP

## 2024-09-28 NOTE — ED PROVIDER NOTES
Emergency Department Provider Note        History of Present Illness     History provided by: Patient  Limitations to History: None  External Records Reviewed with Brief Summary: None    HPI:  Link Macias is a 63 y.o. male with a history significant for PVD s/p BL LE amputation, NIDDM 2, HLD, and COPD presenting to the ED with complaint of right hip pain.  Patient reports having right hip pain since 10 AM yesterday but has been constant and sharp with no known trauma or irritation to the area.  Denies any relief or taking anything to mitigate the pain.  States the pain feels nerve related.  He does not take anything for pain chronically.  Reports that he experiences this pain approximately once per month.  Denies fevers, GI symptoms, urinary symptoms, URI symptoms, or rash.    Physical Exam   Triage vitals:  T 36.7 °C (98.1 °F)  HR 61  /76  RR 18  O2 98 % None (Room air)    General: Awake, alert, in no acute distress  Eyes: Gaze conjugate.  No scleral icterus. BL conjunctival injection  HENT: Normo-cephalic, atraumatic. No stridor  CV: Regular rate, regular rhythm. Radial pulses 2+ bilaterally  Resp: Breathing non-labored, speaking in full sentences.  Clear to auscultation bilaterally  GI: Soft, non-distended, non-tender. No rebound or guarding.  MSK/Extremities: No gross bony deformities. Moving all extremities.  Bilateral LE amputee.  No tenderness to palpation of the right hip at the area of pain.    Skin: Warm. Appropriate color. No fluctuance or skin color changes at the hip.  Neuro: Alert. Oriented. Face symmetric. Speech is fluent.  Gross strength and sensation intact in b/l UE.  Psych: Appropriate mood and affect    Medical Decision Making & ED Course   Medical Decision Makin y.o. male presenting to the ED with complaint of constant acute on chronic right hip pain since 10 AM.  Patient is afebrile, hypertensive, and saturating well on room air.  Exam findings mentioned above.  Due to lack  of skin changes I do not suspect infectious process.  Patient had a CT abdomen pelvis with contrast 3 days ago and showed possible mild proctitis versus hemorrhoids or under distention of the rectum but otherwise no acute findings.  Patient is not complaining of rectal pain today.  Toradol and lidocaine patch provided with improvement in pain from a 10 to a 7/10.  Agreeable to Tylenol.  Tylenol provided and patient sleeping comfortably in bed.  Admits to having changes to his wheelchair seat due to uncomfortability which is likely contributing to pain.  Patient also noted to have food insecurity and in need of resupply.  Provided a short course.    Patient endorses improvement in pain and is comfortable returning home.  Tylenol, ibuprofen, and lidocaine patches prescribed.  Strict return precautions provided.  ----  Differential diagnoses considered include but are not limited to: abscess, cellulitis, pressure pain, neuropathic pain     Social Determinants of Health which Significantly Impact Care: Food insecurity Patient is established with food assistance program but was unable to get food filled for the past several days due to issues with wheelchair that has been fixed. Patient provided frozen foods in the interim.    Chronic conditions affecting the patient's care: As documented above in MDM    The patient was discussed with the following consultants/services: None    Care Considerations: As documented above in [facility]    ED Course:  Diagnoses as of 09/28/24 0611   Right hip pain     Disposition   As a result of the work-up, the patient was discharged home.  he was informed of his diagnosis and instructed to come back with any concerns or worsening of condition.  he and was agreeable to the plan as discussed above.  he was given the opportunity to ask questions.  All of the patient's questions were answered.    Procedures   Procedures    Patient seen and discussed with ED attending physician.    Bennett Godoy,  MD  Emergency Medicine     Bennett Godoy MD  Resident  09/28/24 0643

## 2024-09-28 NOTE — ED TRIAGE NOTES
"Patient presented to ED from home via ECFD for right hip pain. Patient states pain is chronic and has been going on for \"a while.\" Pain started at 2200 and is rated 10/10 and described as throbbing. Endorses allergy to Penicillin, unsure of reaction. A&Ox4.   "

## 2024-09-28 NOTE — DISCHARGE INSTRUCTIONS
Seek immediate medical attention should you have:  fever of 38 C (100.4 F), shortness of breath, chest pain, weakness, confusion, vomiting, or any worsening or concerning symptoms.

## 2024-09-30 ENCOUNTER — HOME CARE VISIT (OUTPATIENT)
Dept: HOME HEALTH SERVICES | Facility: HOME HEALTH | Age: 64
End: 2024-09-30
Payer: MEDICAID

## 2024-09-30 ASSESSMENT — ENCOUNTER SYMPTOMS
APPETITE LEVEL: GOOD
PAIN: 1
LAST BOWEL MOVEMENT: 67109

## 2024-09-30 NOTE — HOME HEALTH
[facility] HOMECARE SERVICES SDOH  SDOH RESOURCE NAME:Follow up  RESOURCE CONTACT:Follow up      [employer]( )  [employer]( )  [employer]( )  [employer]( )  DIGITAL/CONNECTION( )  HOUSING( )  TRANSPORATION( )  SOCIAL CONNECTIONS( )  STRESS/SUPPORT( )  UTLILITIES( )  DEPRESSION( )  DRUG/ALCOHOL/TOBACCO ( )  FINANCIAL STRAIN( )  FOOD INSECURITY( )  PASSPORT( )  PHONE/ADP PROGRAM( )  HOUSEHOLD/FURNITURE( )  ASSIT.LIVING( )     OTHER( )    FOLLOW UP: YES ( ) NO(x ) Follow up   ADDTIONAL COMMENT:

## 2024-10-01 ENCOUNTER — HOME CARE VISIT (OUTPATIENT)
Dept: HOME HEALTH SERVICES | Facility: HOME HEALTH | Age: 64
End: 2024-10-01
Payer: MEDICAID

## 2024-10-01 VITALS
SYSTOLIC BLOOD PRESSURE: 106 MMHG | RESPIRATION RATE: 18 BRPM | TEMPERATURE: 97.6 F | HEART RATE: 66 BPM | DIASTOLIC BLOOD PRESSURE: 62 MMHG

## 2024-10-01 PROCEDURE — G0300 HHS/HOSPICE OF LPN EA 15 MIN: HCPCS

## 2024-10-01 ASSESSMENT — ENCOUNTER SYMPTOMS
LAST BOWEL MOVEMENT: 67114
BLURRED VISION: 1
PERSON REPORTING PAIN: PATIENT
DENIES PAIN: 1
CHANGE IN APPETITE: UNCHANGED
STOOL FREQUENCY: LESS THAN DAILY
BOWEL PATTERN NORMAL: 1
APPETITE LEVEL: GOOD

## 2024-10-01 ASSESSMENT — PAIN SCALES - PAIN ASSESSMENT IN ADVANCED DEMENTIA (PAINAD)
CONSOLABILITY: 0
NEGVOCALIZATION: 0 - NONE.
FACIALEXPRESSION: 0
CONSOLABILITY: 0 - NO NEED TO CONSOLE.
BODYLANGUAGE: 0 - RELAXED.
BREATHING: 0
FACIALEXPRESSION: 0 - SMILING OR INEXPRESSIVE.
BODYLANGUAGE: 0
NEGVOCALIZATION: 0
TOTALSCORE: 0

## 2024-10-03 ENCOUNTER — HOSPITAL ENCOUNTER (EMERGENCY)
Facility: HOSPITAL | Age: 64
Discharge: HOME | End: 2024-10-04
Attending: STUDENT IN AN ORGANIZED HEALTH CARE EDUCATION/TRAINING PROGRAM
Payer: MEDICAID

## 2024-10-03 DIAGNOSIS — L21.9 SEBORRHEIC DERMATITIS OF SCALP: Primary | ICD-10-CM

## 2024-10-03 DIAGNOSIS — L98.491 SKIN ULCER, LIMITED TO BREAKDOWN OF SKIN: ICD-10-CM

## 2024-10-03 PROCEDURE — 99283 EMERGENCY DEPT VISIT LOW MDM: CPT | Performed by: STUDENT IN AN ORGANIZED HEALTH CARE EDUCATION/TRAINING PROGRAM

## 2024-10-03 PROCEDURE — 99283 EMERGENCY DEPT VISIT LOW MDM: CPT

## 2024-10-03 ASSESSMENT — PAIN DESCRIPTION - LOCATION: LOCATION: HEAD

## 2024-10-03 ASSESSMENT — PAIN - FUNCTIONAL ASSESSMENT: PAIN_FUNCTIONAL_ASSESSMENT: 0-10

## 2024-10-03 ASSESSMENT — PAIN SCALES - GENERAL: PAINLEVEL_OUTOF10: 10 - WORST POSSIBLE PAIN

## 2024-10-04 VITALS
BODY MASS INDEX: 23.54 KG/M2 | HEIGHT: 67 IN | WEIGHT: 150 LBS | HEART RATE: 58 BPM | RESPIRATION RATE: 18 BRPM | DIASTOLIC BLOOD PRESSURE: 63 MMHG | TEMPERATURE: 97.9 F | OXYGEN SATURATION: 96 % | SYSTOLIC BLOOD PRESSURE: 116 MMHG

## 2024-10-04 PROCEDURE — 2500000001 HC RX 250 WO HCPCS SELF ADMINISTERED DRUGS (ALT 637 FOR MEDICARE OP): Mod: SE

## 2024-10-04 RX ORDER — SULFAMETHOXAZOLE AND TRIMETHOPRIM 400; 80 MG/1; MG/1
1 TABLET ORAL 2 TIMES DAILY
Qty: 10 TABLET | Refills: 0 | Status: SHIPPED | OUTPATIENT
Start: 2024-10-05 | End: 2024-10-10

## 2024-10-04 RX ORDER — ACETAMINOPHEN 325 MG/1
650 TABLET ORAL ONCE
Status: COMPLETED | OUTPATIENT
Start: 2024-10-04 | End: 2024-10-04

## 2024-10-04 RX ORDER — SULFAMETHOXAZOLE AND TRIMETHOPRIM 400; 80 MG/1; MG/1
1 TABLET ORAL 2 TIMES DAILY
Qty: 10 TABLET | Refills: 0 | Status: SHIPPED | OUTPATIENT
Start: 2024-10-05 | End: 2024-10-04

## 2024-10-04 RX ORDER — KETOCONAZOLE 20 MG/G
CREAM TOPICAL
Qty: 15 G | Refills: 0 | Status: SHIPPED | OUTPATIENT
Start: 2024-10-04 | End: 2024-10-04

## 2024-10-04 RX ORDER — KETOCONAZOLE 20 MG/G
CREAM TOPICAL
Qty: 15 G | Refills: 0 | Status: SHIPPED | OUTPATIENT
Start: 2024-10-04 | End: 2024-10-06

## 2024-10-04 RX ORDER — KETOCONAZOLE 20 MG/ML
SHAMPOO, SUSPENSION TOPICAL 2 TIMES WEEKLY
Qty: 120 ML | Refills: 0 | Status: SHIPPED | OUTPATIENT
Start: 2024-10-07 | End: 2024-10-06

## 2024-10-04 RX ORDER — KETOCONAZOLE 20 MG/ML
SHAMPOO, SUSPENSION TOPICAL 2 TIMES WEEKLY
Qty: 120 ML | Refills: 0 | Status: SHIPPED | OUTPATIENT
Start: 2024-10-07 | End: 2024-10-04

## 2024-10-04 NOTE — DISCHARGE INSTRUCTIONS
You were evaluated in the ED for concern of sores on your scalp. A prescription has been sent to your pharmacy for a antifungal shampoo and cream for your scalp and ears. An antibiotic has been sent for the source behind your ears. Please follow-up with your primary care doctor as needed. Return to the ED with any worsening symptoms or concerns.

## 2024-10-04 NOTE — ED PROVIDER NOTES
History of Present Illness     History provided by: Patient  Limitations to History: None  External Records Reviewed with Brief Summary: None    HPI:  Link Macias is a 63 y.o. male with PVD s/p bilateral LE amputation, T2DM, HLD, COPD presenting for concern of sores on his scalp and behind his ears. Patient states that the sores have been present for several weeks. He reports some itching and dry skin. He denies bleeding or pus draining from the sores. He denies other systemic symptoms such as CP, SOB, fever. Patient additionally endorsing a HA that started prior to arrival. He denies sudden onset but that it came on gradually. He denies vision loss. He states that is similar to his typical headache.    Physical Exam   Triage vitals:  T 36.6 °C (97.9 °F)  HR 52  BP (!) 105/46  RR 16  O2 100 % None (Room air)    General: Awake, alert, in no acute distress  Eyes: Gaze conjugate.  No scleral icterus or injection  HENT: Normo-cephalic, atraumatic. No stridor. Ulcerations behind ears bilaterally in the intertriginous area. No purulent drainage, no bleeding no surrounding erythema. Excoriations on ears with dry flaking skin. Flaking skin and scales on scalp.  CV: Regular rate, regular rhythm. No murmurs.  Resp: Breathing non-labored, speaking in full sentences.  Clear to auscultation bilaterally. No wheezing, rales, rhonchi.   GI: Soft, non-distended, non-tender. No rebound or guarding.  MSK/Extremities: No gross bony deformities.   Skin: Warm. Appropriate color. Flaking on scalp and ears.  Neuro: Alert. Oriented. Face symmetric. Speech is fluent. Gross motor and sensation intact in BL UE.   Psych: Appropriate mood and affect    Medical Decision Making & ED Course   Medical Decision Makin y.o. male presenting with concern for sores on his scalp and ears. Patient has ulcerations in the intertriginous area behind his ears bilaterally. There is no surrounding erythema or purulent drainage, but will give  prescription for oral antibiotics prophylactically. Additionally patient has flaky skin and scales on his scalp in multiple areas. Patient states these are pruritic. Considered seborrheic dermatitis vs. Eczema. Due to the flaky, orderly, scaly nature will give the patient prescriptions for ketoconazole cream and shampoo for seborrheic dermatitis. Will also give patient prescription for Bactrim to treat the ulcerations behind his ears prophylactically. Discussed this with the patient and he was agreeable. Also gave him a dose of Tylenol in the ED for his headache.      ED Course:  Diagnoses as of 10/04/24 0248   Seborrheic dermatitis of scalp   Skin ulcer, limited to breakdown of skin       EKG Independent Interpretation: EKG not obtained      The patient was discussed with the following consultants/services: None  ----         Social Determinants of Health which Significantly Impact Care: Food insecurity The following actions were taken to address these social determinants: patient provided meal in the ED.    Independent Result Review and Interpretation: None obtained    Chronic conditions affecting the patient's care: As documented above in MDM      Care Considerations: As documented above in MDM      Disposition   As a result of the work-up, the patient was discharged home.  he was informed of his diagnosis and instructed to come back with any concerns or worsening of condition.  he and was agreeable to the plan as discussed above.  he was given the opportunity to ask questions.  All of the patient's questions were answered.    Procedures   Procedures    Patient seen and discussed with ED attending physician.    Darian Gunter DO  Emergency Medicine PGY1     Darian Gunter DO  Resident  10/04/24 0141       Darian Gunter DO  Resident  10/04/24 0248

## 2024-10-06 ENCOUNTER — HOSPITAL ENCOUNTER (EMERGENCY)
Facility: HOSPITAL | Age: 64
Discharge: HOME | End: 2024-10-06
Attending: STUDENT IN AN ORGANIZED HEALTH CARE EDUCATION/TRAINING PROGRAM
Payer: MEDICAID

## 2024-10-06 VITALS
BODY MASS INDEX: 29.45 KG/M2 | HEIGHT: 60 IN | SYSTOLIC BLOOD PRESSURE: 151 MMHG | TEMPERATURE: 98.1 F | HEART RATE: 54 BPM | WEIGHT: 150 LBS | DIASTOLIC BLOOD PRESSURE: 66 MMHG | RESPIRATION RATE: 16 BRPM | OXYGEN SATURATION: 97 %

## 2024-10-06 DIAGNOSIS — N48.1 BALANITIS: ICD-10-CM

## 2024-10-06 DIAGNOSIS — L21.9 SEBORRHEIC DERMATITIS OF SCALP: ICD-10-CM

## 2024-10-06 DIAGNOSIS — M25.551 RIGHT HIP PAIN: ICD-10-CM

## 2024-10-06 DIAGNOSIS — M79.18 RIGHT BUTTOCK PAIN: Primary | ICD-10-CM

## 2024-10-06 PROCEDURE — 99284 EMERGENCY DEPT VISIT MOD MDM: CPT | Performed by: STUDENT IN AN ORGANIZED HEALTH CARE EDUCATION/TRAINING PROGRAM

## 2024-10-06 PROCEDURE — 2500000004 HC RX 250 GENERAL PHARMACY W/ HCPCS (ALT 636 FOR OP/ED): Mod: SE

## 2024-10-06 PROCEDURE — 99283 EMERGENCY DEPT VISIT LOW MDM: CPT

## 2024-10-06 PROCEDURE — 96372 THER/PROPH/DIAG INJ SC/IM: CPT

## 2024-10-06 PROCEDURE — 2500000001 HC RX 250 WO HCPCS SELF ADMINISTERED DRUGS (ALT 637 FOR MEDICARE OP): Mod: SE

## 2024-10-06 RX ORDER — KETOROLAC TROMETHAMINE 15 MG/ML
15 INJECTION, SOLUTION INTRAMUSCULAR; INTRAVENOUS ONCE
Status: COMPLETED | OUTPATIENT
Start: 2024-10-06 | End: 2024-10-06

## 2024-10-06 RX ORDER — LIDOCAINE 560 MG/1
1 PATCH PERCUTANEOUS; TOPICAL; TRANSDERMAL DAILY
Qty: 4 PATCH | Refills: 0 | Status: SHIPPED | OUTPATIENT
Start: 2024-10-06

## 2024-10-06 RX ORDER — ACETAMINOPHEN 325 MG/1
975 TABLET ORAL ONCE
Status: COMPLETED | OUTPATIENT
Start: 2024-10-06 | End: 2024-10-06

## 2024-10-06 RX ORDER — KETOCONAZOLE 20 MG/ML
SHAMPOO, SUSPENSION TOPICAL 2 TIMES WEEKLY
Qty: 120 ML | Refills: 0 | Status: SHIPPED | OUTPATIENT
Start: 2024-10-07

## 2024-10-06 RX ORDER — KETOCONAZOLE 20 MG/G
CREAM TOPICAL
Qty: 15 G | Refills: 0 | Status: SHIPPED | OUTPATIENT
Start: 2024-10-06

## 2024-10-06 RX ORDER — ACETAMINOPHEN 500 MG
1000 TABLET ORAL EVERY 6 HOURS PRN
Qty: 30 TABLET | Refills: 0 | Status: SHIPPED | OUTPATIENT
Start: 2024-10-06 | End: 2024-10-16

## 2024-10-06 ASSESSMENT — PAIN SCALES - GENERAL
PAINLEVEL_OUTOF10: 10 - WORST POSSIBLE PAIN

## 2024-10-06 ASSESSMENT — PAIN - FUNCTIONAL ASSESSMENT
PAIN_FUNCTIONAL_ASSESSMENT: 0-10
PAIN_FUNCTIONAL_ASSESSMENT: 0-10

## 2024-10-06 ASSESSMENT — PAIN DESCRIPTION - LOCATION
LOCATION: BUTTOCKS
LOCATION: BACK

## 2024-10-06 ASSESSMENT — PAIN DESCRIPTION - DESCRIPTORS: DESCRIPTORS: ACHING

## 2024-10-06 ASSESSMENT — PAIN DESCRIPTION - PAIN TYPE: TYPE: ACUTE PAIN

## 2024-10-06 ASSESSMENT — LIFESTYLE VARIABLES
TOTAL SCORE: 0
HAVE YOU EVER FELT YOU SHOULD CUT DOWN ON YOUR DRINKING: NO
EVER HAD A DRINK FIRST THING IN THE MORNING TO STEADY YOUR NERVES TO GET RID OF A HANGOVER: NO
EVER FELT BAD OR GUILTY ABOUT YOUR DRINKING: NO
HAVE PEOPLE ANNOYED YOU BY CRITICIZING YOUR DRINKING: NO

## 2024-10-06 ASSESSMENT — PAIN DESCRIPTION - FREQUENCY: FREQUENCY: CONSTANT/CONTINUOUS

## 2024-10-06 NOTE — ED PROVIDER NOTES
Emergency Department Provider Note        History of Present Illness     History provided by: Patient  Limitations to History: None  External Records Reviewed with Brief Summary:  ED provider note 10/3/2024 showing complaint for itchiness behind his ears with concern for seborrheic dermatitis versus eczema and was prescribed ketoconazole cream and shampoo    HPI:  Link Macias is a 63 y.o. male with a history of PVD s/p BL LE amputation, NIDDM 2, HLD, and COPD presenting to the ED with a complaint of right buttock pain and difficulty paying for medications.  Patient reports that he has been having lower back pain and right buttock pain for the past day as well as itchiness behind both his ears for the past 5 days that he presented to the ED for and was prescribed hydrocortisone cream but when he went to  his prescription from [email or web address] it came with an $11 co-pay that he could not pay for.  States that [facility] and are automatically delivered to him.    Physical Exam   Triage vitals:  T 36.7 °C (98.1 °F)  HR 57  /68  RR 18  O2 97 % None (Room air)    General: Awake, alert, in no acute distress  Eyes: Gaze conjugate.  No scleral icterus. BL conjunctival injection  HENT: Normo-cephalic, atraumatic. No stridor  CV: Regular rate, regular rhythm. Radial pulses 2+ bilaterally  Resp: Breathing non-labored, speaking in full sentences.  Clear to auscultation bilaterally  GI: Soft, non-distended, non-tender. No rebound or guarding.  MSK/Extremities: No gross bony deformities. Moving all extremities.  Bilateral LE amputee.  No tenderness to palpation of the right hip at the area of pain.    Skin: Warm. Appropriate color. No fluctuance or skin color changes at the hip. Flaking on scalp and ears.   Neuro: Alert. Oriented. Face symmetric. Speech is fluent.  Gross strength and sensation intact in b/l UE.  Psych: Appropriate mood and affect    Medical Decision Making & ED Course    Medical Decision Makin y.o. male with a history significant for bilateral amputee presenting to the ED with complaint of right buttock pain similar to his prior pain and issues picking up his prescriptions.  The area of his pain has no skin color changes or tenderness to palpation.  The area behind his ear is dry and flaky without any erythema, tenderness, or swelling.  I do not suspect underlying cellulitis, mastoiditis, fasciitis, or fracture.  CT A/P reviewed from 2024 and there showed potential mild inflammation of the rectum but no swelling or abscess of the right hip.  Patient provided Toradol and Tylenol for pain which he was agreeable to.    Patient discharged with correction of his home prescriptions to [facility] pharmacy at the patient's request. States he receives these prescriptions mailed to him and does not have to pay. He has medicare insurance. Social Work came and evaluated patient and also recommended this plan.  ----   Social Determinants of Health which Significantly Impact Care: Difficulty paying for/obtaining medications The following actions were taken to address these social determinants: Patient offered evaluation by Social Work    Chronic conditions affecting the patient's care: As documented above in [facility]    The patient was discussed with the following consultants/services:  Discussed with social work discharge planning for patient    Care Considerations: As documented above in [facility]    ED Course:  ED Course as of 10/06/24 1449   Sun Oct 06, 2024   1352 Personally reviewed CT A/P with contrast 24 and was WNL, patient reports diffuse pain around his butt, he has no focal tenderness, no abscess, no skin breakdown, no ulcer. With recent imaging negative, repeat imaging with normal vitals does not appear appropriate. Patient also requesting shampoo and help with the itchy dry skin on his scalp. He was just seen for this on 10/3 and had shampoos, antibiotics, anti-fungal creams sent  to his pharmacy. We discussed this, patient should go  these scripts. Tylenol, toradol improved symptoms, appropriate for home. [JH]      ED Course User Index  [JH] Todd Moreno MD         Diagnoses as of 10/06/24 8430   Right buttock pain     Disposition   As a result of the work-up, the patient was discharged home.  he was informed of his diagnosis and instructed to come back with any concerns or worsening of condition.  he and was agreeable to the plan as discussed above.  he was given the opportunity to ask questions.  All of the patient's questions were answered.    Procedures   Procedures    Patient seen and discussed with ED attending physician.    Bennett Godoy MD  Emergency Medicine, PGY3     Bennett Godoy MD  Resident  10/06/24 0161

## 2024-10-06 NOTE — PROGRESS NOTES
Link Macias is a 63 y.o. male with bilateral lower extremity amputations (above knee) is currently in the ED due to back pain. Patient has presented similarly in the past, and has had multiple complete work-ups for health concerns; dispo typically discharge home. Patient shares good follow-up involving multiple medical specialties outpatient, and  Home Care. SW provided patient's information to HC team for further review and coordination of care. Patient confirms he has and knows how to access transportation when he needs it. Patient reports Patient reports that he is having some confusion with the cost of his needed medications. Patient told ED Provider that he has had difficulty obtaining them for this reason. Patient reports pain at this time, agreeable to medical plan with medications for pain management.  ARIANNE to place patient in roundtrip in order to schedule stretcher ambulette for safe transportation home to below address.       10/06/24 7743   Discharge Planning   Living Arrangements Alone   Support Systems Friends/neighbors;Family members;/;Other (Comment)  (Medical team)   Assistance Needed Safe discharge plan - Home ([facility] [address])  - [address]   [place] [id number]   Type of Residence Private residence   Number of Stairs to Enter Residence 0   Number of Stairs Within Residence 0   Do you have animals or pets at home? No   Who is requesting discharge planning? Provider   Home or Post Acute Services In home services;Post acute facilities (Rehab/SNF/etc)   Type of Post Acute Facility Services Skilled nursing   Type of Home Care Services Home health aide;Home nursing visits;Home OT;Home PT  ( Home Health)   Expected Discharge Disposition Home   Does the patient need discharge transport arranged? Yes   RoundTrip coordination needed? Yes   Has discharge transport been arranged? No   What day is the transport expected? 10/06/24   What time is the  transport expected? 3856

## 2024-10-06 NOTE — ED TRIAGE NOTES
Pt brought in by EMS d/t increase back and butt pain. Pt said it hurts to sit and he wants to eat.

## 2024-10-06 NOTE — DISCHARGE INSTRUCTIONS
You were seen today with your chronic right buttock pain. We reviewed your CT from 2 weeks ago and compared it to your clinical exam, and you do not have signs you need repeat imaging. You already had medications and shampoo sent to the pharmacy for your dry scalp, please go  these medications.

## 2024-10-06 NOTE — PROGRESS NOTES
Link Macias is a 63 y.o. male currently in the ED due to back pain. Patient has presented similarly in the past. Patient reports that he is having some confusion with the cost of his needed medications. Patient told ED Provider that he has had difficulty obtaining them for this reason. Patient reports pain at this time, agreeable to medical plan with medications for pain management.  SW to place patient in roundtrip in order to schedule stretcher ambulette for safe transportation home to below address.       10/06/24 7765   Discharge Planning   Living Arrangements Alone   Support Systems Friends/neighbors;Family members;/;Other (Comment)  (Medical team)   Assistance Needed Safe discharge plan - Home ([facility] [address])  - [address]   [place] [id number]   Type of Residence Private residence   Number of Stairs to Enter Residence 0   Number of Stairs Within Residence 0   Do you have animals or pets at home? No   Who is requesting discharge planning? Provider   Home or Post Acute Services In home services;Post acute facilities (Rehab/SNF/etc)   Type of Post Acute Facility Services Skilled nursing   Type of Home Care Services Home health aide;Home nursing visits;Home OT;Home PT  ( Home Health)

## 2024-10-08 ENCOUNTER — HOME CARE VISIT (OUTPATIENT)
Dept: HOME HEALTH SERVICES | Facility: HOME HEALTH | Age: 64
End: 2024-10-08
Payer: MEDICAID

## 2024-10-08 VITALS
OXYGEN SATURATION: 95 % | HEART RATE: 58 BPM | DIASTOLIC BLOOD PRESSURE: 62 MMHG | RESPIRATION RATE: 18 BRPM | SYSTOLIC BLOOD PRESSURE: 122 MMHG | TEMPERATURE: 97.5 F

## 2024-10-08 PROCEDURE — G0300 HHS/HOSPICE OF LPN EA 15 MIN: HCPCS

## 2024-10-08 ASSESSMENT — ENCOUNTER SYMPTOMS
LAST BOWEL MOVEMENT: 67118
PAIN LOCATION - PAIN FREQUENCY: FREQUENT
PAIN SEVERITY GOAL: 0/10
PAIN: 1
PAIN LOCATION - PAIN SEVERITY: 8/10
APPETITE LEVEL: GOOD
PERSON REPORTING PAIN: PATIENT
PAIN LOCATION: COCCYX
HIGHEST PAIN SEVERITY IN PAST 24 HOURS: 10/10
LOWEST PAIN SEVERITY IN PAST 24 HOURS: 6/10
PAIN LOCATION - PAIN QUALITY: ACHE

## 2024-10-08 ASSESSMENT — PAIN SCALES - PAIN ASSESSMENT IN ADVANCED DEMENTIA (PAINAD)
NEGVOCALIZATION: 0
BODYLANGUAGE: 0
FACIALEXPRESSION: 0 - SMILING OR INEXPRESSIVE.
CONSOLABILITY: 0
FACIALEXPRESSION: 0
TOTALSCORE: 0
NEGVOCALIZATION: 0 - NONE.
BODYLANGUAGE: 0 - RELAXED.
CONSOLABILITY: 0 - NO NEED TO CONSOLE.
BREATHING: 0

## 2024-10-09 ENCOUNTER — CLINICAL SUPPORT (OUTPATIENT)
Dept: NUTRITION | Facility: CLINIC | Age: 64
End: 2024-10-09
Payer: MEDICAID

## 2024-10-09 DIAGNOSIS — K62.89 RECTAL PAIN: ICD-10-CM

## 2024-10-09 NOTE — PROGRESS NOTES
[employer]  Diet Recommendation 1: Healthy Eating  Food Intolerance Avoidance: NKFA  Household Size: 1 Family Member  Interventions: Referral Number: 1st 6 Mo Referral 6 Mos  Interventions: Visit Number: 1 of 6 Visits - Max 6 Visits/Referral Each 6 Mo Period  Grains: 25-50% Whole  Fruit: 50-75% Fresh  Vegetables: 50-75% Fresh  Proteins: 1-2 Plant-based Items  Dairy: Lowfat - 100%  Originating Site of Referral Order: [facility]  Initials of RD Assisting Today: ALEJANDRA

## 2024-10-10 ENCOUNTER — CLINICAL SUPPORT (OUTPATIENT)
Dept: EMERGENCY MEDICINE | Facility: HOSPITAL | Age: 64
End: 2024-10-10
Payer: MEDICAID

## 2024-10-10 ENCOUNTER — HOSPITAL ENCOUNTER (EMERGENCY)
Facility: HOSPITAL | Age: 64
Discharge: HOME | End: 2024-10-11
Payer: MEDICAID

## 2024-10-10 VITALS
BODY MASS INDEX: 62.74 KG/M2 | DIASTOLIC BLOOD PRESSURE: 64 MMHG | SYSTOLIC BLOOD PRESSURE: 149 MMHG | OXYGEN SATURATION: 99 % | WEIGHT: 150 LBS | HEART RATE: 55 BPM | RESPIRATION RATE: 18 BRPM

## 2024-10-10 DIAGNOSIS — M79.18 PAIN IN RIGHT BUTTOCK: ICD-10-CM

## 2024-10-10 DIAGNOSIS — R07.9 CHEST PAIN, UNSPECIFIED TYPE: Primary | ICD-10-CM

## 2024-10-10 LAB
ATRIAL RATE: 54 BPM
P AXIS: 68 DEGREES
P OFFSET: 171 MS
P ONSET: 127 MS
PR INTERVAL: 198 MS
Q ONSET: 226 MS
QRS COUNT: 9 BEATS
QRS DURATION: 72 MS
QT INTERVAL: 382 MS
QTC CALCULATION(BAZETT): 362 MS
QTC FREDERICIA: 368 MS
R AXIS: 51 DEGREES
T AXIS: -39 DEGREES
T OFFSET: 417 MS
VENTRICULAR RATE: 54 BPM

## 2024-10-10 PROCEDURE — 84484 ASSAY OF TROPONIN QUANT: CPT | Performed by: EMERGENCY MEDICINE

## 2024-10-10 PROCEDURE — 85025 COMPLETE CBC W/AUTO DIFF WBC: CPT | Performed by: EMERGENCY MEDICINE

## 2024-10-10 PROCEDURE — 80053 COMPREHEN METABOLIC PANEL: CPT | Performed by: EMERGENCY MEDICINE

## 2024-10-10 PROCEDURE — 99285 EMERGENCY DEPT VISIT HI MDM: CPT

## 2024-10-10 PROCEDURE — 99283 EMERGENCY DEPT VISIT LOW MDM: CPT

## 2024-10-10 PROCEDURE — 93010 ELECTROCARDIOGRAM REPORT: CPT

## 2024-10-10 PROCEDURE — 36415 COLL VENOUS BLD VENIPUNCTURE: CPT | Performed by: EMERGENCY MEDICINE

## 2024-10-10 PROCEDURE — 83735 ASSAY OF MAGNESIUM: CPT | Performed by: EMERGENCY MEDICINE

## 2024-10-10 PROCEDURE — 93005 ELECTROCARDIOGRAM TRACING: CPT

## 2024-10-11 ENCOUNTER — APPOINTMENT (OUTPATIENT)
Dept: RADIOLOGY | Facility: HOSPITAL | Age: 64
End: 2024-10-11
Payer: MEDICAID

## 2024-10-11 ENCOUNTER — APPOINTMENT (OUTPATIENT)
Dept: PAIN MEDICINE | Facility: CLINIC | Age: 64
End: 2024-10-11
Payer: MEDICAID

## 2024-10-11 DIAGNOSIS — M54.12 CERVICAL RADICULITIS: Primary | ICD-10-CM

## 2024-10-11 LAB
ALBUMIN SERPL BCP-MCNC: 4 G/DL (ref 3.4–5)
ALP SERPL-CCNC: 88 U/L (ref 33–136)
ALT SERPL W P-5'-P-CCNC: 16 U/L (ref 10–52)
ANION GAP SERPL CALC-SCNC: 15 MMOL/L (ref 10–20)
AST SERPL W P-5'-P-CCNC: 24 U/L (ref 9–39)
BASOPHILS # BLD AUTO: 0.03 X10*3/UL (ref 0–0.1)
BASOPHILS NFR BLD AUTO: 0.9 %
BILIRUB SERPL-MCNC: 0.2 MG/DL (ref 0–1.2)
BUN SERPL-MCNC: 20 MG/DL (ref 6–23)
CALCIUM SERPL-MCNC: 10.2 MG/DL (ref 8.6–10.6)
CARDIAC TROPONIN I PNL SERPL HS: 6 NG/L (ref 0–53)
CARDIAC TROPONIN I PNL SERPL HS: 8 NG/L (ref 0–53)
CHLORIDE SERPL-SCNC: 105 MMOL/L (ref 98–107)
CO2 SERPL-SCNC: 25 MMOL/L (ref 21–32)
CREAT SERPL-MCNC: 0.94 MG/DL (ref 0.5–1.3)
EGFRCR SERPLBLD CKD-EPI 2021: >90 ML/MIN/1.73M*2
EOSINOPHIL # BLD AUTO: 0.07 X10*3/UL (ref 0–0.7)
EOSINOPHIL NFR BLD AUTO: 2 %
ERYTHROCYTE [DISTWIDTH] IN BLOOD BY AUTOMATED COUNT: 15.1 % (ref 11.5–14.5)
GLUCOSE SERPL-MCNC: 123 MG/DL (ref 74–99)
HCT VFR BLD AUTO: 46.7 % (ref 41–52)
HGB BLD-MCNC: 14.4 G/DL (ref 13.5–17.5)
IMM GRANULOCYTES # BLD AUTO: 0.01 X10*3/UL (ref 0–0.7)
IMM GRANULOCYTES NFR BLD AUTO: 0.3 % (ref 0–0.9)
LYMPHOCYTES # BLD AUTO: 1.57 X10*3/UL (ref 1.2–4.8)
LYMPHOCYTES NFR BLD AUTO: 45.6 %
MAGNESIUM SERPL-MCNC: 1.97 MG/DL (ref 1.6–2.4)
MCH RBC QN AUTO: 24.4 PG (ref 26–34)
MCHC RBC AUTO-ENTMCNC: 30.8 G/DL (ref 32–36)
MCV RBC AUTO: 79 FL (ref 80–100)
MONOCYTES # BLD AUTO: 0.45 X10*3/UL (ref 0.1–1)
MONOCYTES NFR BLD AUTO: 13.1 %
NEUTROPHILS # BLD AUTO: 1.31 X10*3/UL (ref 1.2–7.7)
NEUTROPHILS NFR BLD AUTO: 38.1 %
NRBC BLD-RTO: 0 /100 WBCS (ref 0–0)
PLATELET # BLD AUTO: 258 X10*3/UL (ref 150–450)
POTASSIUM SERPL-SCNC: 5 MMOL/L (ref 3.5–5.3)
PROT SERPL-MCNC: 6.8 G/DL (ref 6.4–8.2)
RBC # BLD AUTO: 5.9 X10*6/UL (ref 4.5–5.9)
SODIUM SERPL-SCNC: 140 MMOL/L (ref 136–145)
WBC # BLD AUTO: 3.4 X10*3/UL (ref 4.4–11.3)

## 2024-10-11 PROCEDURE — 2500000001 HC RX 250 WO HCPCS SELF ADMINISTERED DRUGS (ALT 637 FOR MEDICARE OP): Mod: SE

## 2024-10-11 PROCEDURE — 4010F ACE/ARB THERAPY RXD/TAKEN: CPT | Performed by: PAIN MEDICINE

## 2024-10-11 PROCEDURE — 71045 X-RAY EXAM CHEST 1 VIEW: CPT | Performed by: RADIOLOGY

## 2024-10-11 PROCEDURE — 84484 ASSAY OF TROPONIN QUANT: CPT | Performed by: EMERGENCY MEDICINE

## 2024-10-11 PROCEDURE — 99213 OFFICE O/P EST LOW 20 MIN: CPT | Performed by: PAIN MEDICINE

## 2024-10-11 PROCEDURE — 36415 COLL VENOUS BLD VENIPUNCTURE: CPT | Performed by: EMERGENCY MEDICINE

## 2024-10-11 PROCEDURE — 71045 X-RAY EXAM CHEST 1 VIEW: CPT

## 2024-10-11 RX ORDER — IBUPROFEN 600 MG/1
600 TABLET ORAL ONCE
Status: COMPLETED | OUTPATIENT
Start: 2024-10-11 | End: 2024-10-11

## 2024-10-11 NOTE — PROGRESS NOTES
Subjective   Patient ID: Mr. Link Macias is a 63-year-old male with a history of bilateral AKA, PAD, type 2 diabetes, who please follow-up with us today for his left shoulder pain.  Patient states that his left shoulder pain has continued to bother him, and seems to have worsened over time since his last visit.  He states that he has been using over-the-counter medications as well as heat/ice packs with no significant relief in his pain.  After his last visit, he was given prescription for physical therapy, but he did not go because he did not know where to go or call to be scheduled for it.    Review of Systems   13-point ROS done and negative except for HPI.     Current Outpatient Medications   Medication Instructions    acetaminophen (TYLENOL) 650 mg, oral, Every 6 hours PRN    acetaminophen (TYLENOL) 1,000 mg, oral, Every 6 hours PRN    aspirin 81 mg, oral, Daily    atorvastatin (LIPITOR) 80 mg, oral, Nightly    chlorhexidine (Hibiclens) 4 % external liquid Topical, Daily PRN    cholecalciferol (VITAMIN D-3) 25 mcg, oral, Daily    diphenhydrAMINE (BENADRYL) 25 mg, oral, Every 8 hours PRN    docusate sodium (Colace) 100 mg capsule 1 capsule, oral, 2 times daily PRN    DULoxetine (CYMBALTA) 60 mg, oral, Daily    empagliflozin (JARDIANCE) 25 mg, oral, Daily with breakfast    famotidine (PEPCID) 20 mg, oral, Daily    gabapentin (NEURONTIN) 100 mg, oral, 3 times daily    gentamicin (Garamycin) 0.1 % ointment 1 Application, Topical, Daily, apply ointment to affected arms and shoulders area.    hydrocortisone (Anusol-HC) 2.5 % rectal cream Insert 1 Application into the rectum 2 times a day as needed for hemorrhoids.    hydrocortisone 2.5 % cream Topical, 2 times daily, To rash on genitals. 14 days on, 7 days off. Repeat as needed for rash.    hydrocortisone 2.5 % cream Topical, 2 times daily    hydrOXYzine HCL (ATARAX) 25 mg, oral, Daily PRN    ketoconazole (NIZOral) 2 % cream Apply topically to both ears once  daily.    ketoconazole (NIZOral) 2 % shampoo Topical, 2 times weekly    lidocaine 4 % patch 1 patch, transdermal, Daily, Remove & discard patch within 12 hours or as directed by MD.    losartan (COZAAR) 100 mg, oral, Daily    melatonin 3 mg, oral, Nightly    metFORMIN (GLUCOPHAGE) 500 mg, oral, 2 times daily (morning and late afternoon)    methocarbamol (ROBAXIN) 500 mg, oral, 2 times daily PRN    metoprolol tartrate (LOPRESSOR) 25 mg, oral, 2 times daily    naproxen (NAPROSYN) 375 mg, oral, Every 12 hours PRN    nitroglycerin (NITROSTAT) 0.4 mg, sublingual, Every 5 min PRN, Up to 3 times. IF NO RELIEF CALL 911     omega-3 acid ethyl esters (Lovaza) 1 gram capsule 2 capsules, oral, Daily    OXcarbazepine (TRILEPTAL) 150 mg, oral, 2 times daily    pantoprazole (PROTONIX) 20 mg, oral, Daily before breakfast, Do not crush, chew, or split. Patient takes as needed.     pregabalin (Lyrica) 75 mg capsule Take 1 capsule (75 mg) by mouth 2 times a day.    triamcinolone (Kenalog) 0.1 % cream Topical, 2 times daily, To rash on trunk and extremities. 14 days on, 7 days off. Repeat as needed for rash.    triamcinolone (Kenalog) 0.1 % ointment Topical, 2 times daily       Past Medical History:   Diagnosis Date    Anxiety     Diabetes mellitus (Multi)     HLD (hyperlipidemia)     Hypertension         Past Surgical History:   Procedure Laterality Date    CT ABDOMEN PELVIS ANGIOGRAM W AND/OR WO IV CONTRAST  12/16/2022    CT ABDOMEN PELVIS ANGIOGRAM W AND/OR WO IV CONTRAST 12/16/2022 DOCTOR OFFICE [facility]    CT ABDOMEN PELVIS ANGIOGRAM W AND/OR WO IV CONTRAST  8/18/2023    CT ABDOMEN PELVIS ANGIOGRAM W AND/OR WO IV CONTRAST [place] [facility] [place]    CT ABDOMEN PELVIS ANGIOGRAM W AND/OR WO IV CONTRAST  9/3/2023    CT ABDOMEN PELVIS ANGIOGRAM W AND/OR WO IV CONTRAST [place] [facility] [place] [place]        No family history on file.     Allergies   Allergen Reactions    Penicillins Unknown        Objective     There were no vitals filed for this visit.      Imaging personally reviewed and independently interpreted:   No results found for this or any previous visit from the past 1000 days.     No image results found.     No diagnosis found.     Assessment/Plan   Mr. Link Macias is a 63-year-old male with a history of bilateral AKA, PAD, type 2 diabetes, who please follow-up with us today for his left shoulder pain.  Patient reports that he was unable to schedule an appointment with physical therapy, thus he has not had any sessions yet.    Plan:  - Referral to physical therapy given to patient.  Patient was instructed to call [phone] to be scheduled for his physical therapy appointment.  He will follow-up with us after he has completed 6 weeks of physical therapy.        Follow up:  After physical therapy    The patient was invited to contact us back anytime with any questions or concerns and follow-up with us in the office as needed.     There are no diagnoses linked to this encounter.    This note was generated with the aid of dictation software, there may be typos despite my attempts at proofreading.     Joan Storm MD  Chronic Pain Medicine Fellow  [facility]

## 2024-10-11 NOTE — DISCHARGE INSTRUCTIONS
The workup we obtained here today was unremarkable.  When you were seen here the day, your prescriptions were resubmitted via your home delivery pharmacy.  You should be receiving them shortly.  Please take them as indicated.  Otherwise, please resume all of your other medications as prescribed and follow-up at all of your upcoming appointments.

## 2024-10-11 NOTE — ED PROVIDER NOTES
"HPI   Chief Complaint   Patient presents with    Multple medical complaints       Patient is a 63-year-old male with a past medical history significant for bilateral lower extremity amputations, NIDDM 2, HLD, and COPD presenting to the ED with chest pain.  Patient endorses left-sided chest pain and left arm pain with onset this evening around 9:30.  He states the pain is currently a 10/10, although he is sitting and talking to me very comfortably.  Patient states this pain happens \"once in a while.\"  When asked if he is experiencing any shortness of breath patient states \"every once in a while.\"  In addition, patient endorses pain in his right buttocks.  He denies any inciting injury or trauma to the area.  He further denies any fevers, chills, flulike symptoms, abdominal pain, nausea/vomiting, or changes in urinary/bowel habits.              Patient History   Past Medical History:   Diagnosis Date    Anxiety     Diabetes mellitus (Multi)     HLD (hyperlipidemia)     Hypertension      Past Surgical History:   Procedure Laterality Date    CT ABDOMEN PELVIS ANGIOGRAM W AND/OR WO IV CONTRAST  12/16/2022    CT ABDOMEN PELVIS ANGIOGRAM W AND/OR WO IV CONTRAST 12/16/2022 [facility]    CT ABDOMEN PELVIS ANGIOGRAM W AND/OR WO IV CONTRAST  8/18/2023    CT ABDOMEN PELVIS ANGIOGRAM W AND/OR WO IV CONTRAST [place] [facility] [place]    [place] ABDOMEN PELVIS ANGIOGRAM W AND/OR WO IV CONTRAST  9/3/2023    CT ABDOMEN PELVIS ANGIOGRAM W AND/OR WO IV CONTRAST [place] [facility] [place]     No family history on file.  Social History     Tobacco Use    Smoking status: Every Day     Current packs/day: 0.50     Types: Cigarettes    Smokeless tobacco: Never   Substance Use Topics    Alcohol use: Not Currently    Drug use: Never       Physical Exam   ED Triage Vitals [10/10/24 2213]   Temp Heart Rate Respirations BP   -- 55 18 149/64      Pulse Ox Temp Source Heart Rate Source Patient Position   99 % Oral -- Sitting      BP Location FiO2 (%)     -- --   "     Physical Exam  Vitals reviewed.   Constitutional:       General: He is not in acute distress.     Appearance: He is not ill-appearing.   Cardiovascular:      Rate and Rhythm: Normal rate and regular rhythm.   Pulmonary:      Effort: Pulmonary effort is normal. No respiratory distress.      Breath sounds: Normal breath sounds.   Genitourinary:     Comments: No obvious abnormality, skin changes, or reproducible TTP of the right buttocks.  Neurological:      General: No focal deficit present.      Mental Status: He is alert.           ED Course & MDM   ED Course as of 10/11/24 1502   Fri Oct 11, 2024   0032 EKG reveals sinus bradycardia with a ventricular rate of 54.  No change when compared with most recent EKG obtained on August 21, 2024. [JL]      ED Course User Index  [JL] Paris Payne PA-C         Diagnoses as of 10/11/24 1502   Chest pain, unspecified type   Pain in right buttock     Labs Reviewed   CBC WITH AUTO DIFFERENTIAL - Abnormal       Result Value    WBC 3.4 (*)     nRBC 0.0      RBC 5.90      Hemoglobin 14.4      Hematocrit 46.7      MCV 79 (*)     MCH 24.4 (*)     MCHC 30.8 (*)     RDW 15.1 (*)     Platelets 258      Neutrophils % 38.1      Immature Granulocytes %, Automated 0.3      Lymphocytes % 45.6      Monocytes % 13.1      Eosinophils % 2.0      Basophils % 0.9      Neutrophils Absolute 1.31      Immature Granulocytes Absolute, Automated 0.01      Lymphocytes Absolute 1.57      Monocytes Absolute 0.45      Eosinophils Absolute 0.07      Basophils Absolute 0.03     COMPREHENSIVE METABOLIC PANEL - Abnormal    Glucose 123 (*)     Sodium 140      Potassium 5.0      Chloride 105      Bicarbonate 25      Anion Gap 15      Urea Nitrogen 20      Creatinine 0.94      eGFR >90      Calcium 10.2      Albumin 4.0      Alkaline Phosphatase 88      Total Protein 6.8      AST 24      Bilirubin, Total 0.2      ALT 16     MAGNESIUM - Normal    Magnesium 1.97     SERIAL TROPONIN-INITIAL - Normal    Troponin  I, High Sensitivity (CMC) 6      Narrative:     Less than 99th percentile of normal range cutoff-  Female and children under 18 years old <35 ng/L; Male <54 ng/L: Negative  Repeat testing should be performed if clinically indicated.     Female and children under 18 years old  ng/L; Male  ng/L:  Consistent with possible cardiac damage and possible increased clinical   risk. Serial measurements may help to assess extent of myocardial damage.     >120 ng/L: Consistent with cardiac damage, increased clinical risk and  myocardial infarction. Serial measurements may help assess extent of   myocardial damage.      NOTE: Children less than 1 year old may have higher baseline troponin   levels and results should be interpreted in conjunction with the overall   clinical context.    NOTE: Troponin I testing is performed using a different   testing methodology at [facility] than at other   [facility]. Direct result comparisons should only   be made within the same method.     SERIAL TROPONIN, 1 HOUR - Normal    Troponin I, High Sensitivity (CMC) 8      Narrative:     Less than 99th percentile of normal range cutoff-  Female and children under 18 years old <35 ng/L; Male <54 ng/L: Negative  Repeat testing should be performed if clinically indicated.     Female and children under 18 years old  ng/L; Male  ng/L:  Consistent with possible cardiac damage and possible increased clinical   risk. Serial measurements may help to assess extent of myocardial damage.     >120 ng/L: Consistent with cardiac damage, increased clinical risk and  myocardial infarction. Serial measurements may help assess extent of   myocardial damage.      NOTE: Children less than 1 year old may have higher baseline troponin   levels and results should be interpreted in conjunction with the overall   clinical context.    NOTE: Troponin I testing is performed using a different   testing methodology at [facility] than at other   system hospitals. Direct result comparisons should only   be made within the same method.     TROPONIN SERIES- (INITIAL, 1 HR)    Narrative:     The following orders were created for panel order Troponin I Series, High Sensitivity (0, 1 HR).  Procedure                               Abnormality         Status                     ---------                               -----------         ------                     Troponin I, High Sensiti...[[id number]]  Normal              Final result               Troponin, High Sensitivi...[[id number]]  Normal              Final result                 Please view results for these tests on the individual orders.     XR chest 1 view   Final Result   1. Cardiomegaly. Mild central vascular congestion.                  MACRO:   None.        Signed by: Migdalia Ontiveros 10/11/2024 1:35 AM   Dictation workstation:   [id number]                  No data recorded     Chitra Coma Scale Score: 15 (10/10/24 2214 : Mateo Lebron RN)                           Medical Decision Making  Patient is a 63-year-old male with a past medical history significant for bilateral lower extremity amputations, NIDDM 2, HLD, and COPD presenting to the ED with chest pain.  History was obtained from the patient as well as his chart.  Endorses left-sided chest pain and left arm pain with onset this evening.  States the pain is 10 out of 10 and this along with shortness of breath happen \"once in a while.\"  In addition, endorses pain in the region of the right buttocks.  Denies inciting injury or trauma.  No fevers, chills, flulike symptoms, or changes in urinary/bowel habits.  On physical exam, patient is sitting comfortably and in no apparent distress.  Lungs CTA bilaterally without increased work of breathing.  Right buttocks reveals no obvious abnormality, skin changes, or reproducible TTP.  Remainder of exam as noted above.  Vital signs are stable.    Review of patient's chart reveals " he is a frequent visitor to this ED.  He has frequently presented throughout the past few weeks with the same symptoms he comes for today.  He was last seen and evaluated on October 6 for the right buttock pain.  At that time, he also endorsed not being able to pay for his prescriptions.  At that time, patient met with social work and he was deemed safe for discharge home.  In addition, his prescriptions were corrected and sent to his mail order pharmacy.  On top of this, patient presented to this ED with left chest and shoulder pain on August 15.  At that time, his workup was unremarkable and he was subsequently admitted for cardiac workup.  Echo and stress testing were performed.  Echo showed hyperdynamic ejection fraction and mild mitral regurgitation.  Stress testing unremarkable.    EKG today unchanged from patient's most recent, as noted in ED course.  Low suspicion the chest pain he is experiencing at this time is secondary to acute etiology such as ACS.  The pain in his buttocks is likely from frequent sitting secondary to being a bilateral lower extremity amputee.  No evidence of infectious process at this time.  NIPP orders were placed in triage consisting of CBC, CMP, troponin, and magnesium.  Chest x-ray also added to this workup.  Patient was treated in the ED with a dose of ibuprofen.    CBC grossly unremarkable from patient's baseline.  CMP within normal limits.  Magnesium normal.  2 sets of troponins normal at 6 and 8.  Chest x-ray without focal consolidation, pleural effusion, or pneumothorax.  Cardiomegaly and mild central vascular congestion was noted.  Upon reassessment, patient does state he is ready for discharge at this time.  He was provided something to eat.  I did emphasize the importance his prescriptions were recently changed to his home pharmacy and should be delivered shortly.  Otherwise, he is to take all of his prescriptions as prescribed and follow-up at upcoming appointments as  indicated.  Patient is agreeable to this plan and all of his questions were answered to satisfaction.  He was discharged from the ED in stable condition.    Of note, patient does tell me he has an phone appointment with main management in the morning.        Procedure  Procedures     Paris Payne PA-C  10/11/24 1509       Paris Payne PA-C  10/11/24 1516

## 2024-10-11 NOTE — ED TRIAGE NOTES
REY FALCON is a 63y old M with a PMHx significant of PVD s/p BL LE amputation, NIDDM 2, HLD, and COPD presenting to the ED with a complaint of right buttock pain and difficulty paying for medications.  Patient reports that he has been having lower back pain and right buttock pain for the past day as well as itchiness behind both his ears for the past 5 days that he presented to the ED for and was prescribed hydrocortisone cream but when he went to  his prescription from [facility] it came with an $11 co-pay that he could not pay for.  States that [facility] and are [facility] delivered to him. Of note: Pt also complains of L side chest pain x2 weeks. Non-radiating in nature. Allergy to PCN

## 2024-10-14 ENCOUNTER — HOSPITAL ENCOUNTER (EMERGENCY)
Facility: HOSPITAL | Age: 64
Discharge: HOME | End: 2024-10-14
Attending: EMERGENCY MEDICINE
Payer: MEDICAID

## 2024-10-14 ENCOUNTER — CLINICAL SUPPORT (OUTPATIENT)
Dept: EMERGENCY MEDICINE | Facility: HOSPITAL | Age: 64
End: 2024-10-14
Payer: MEDICAID

## 2024-10-14 VITALS
OXYGEN SATURATION: 98 % | TEMPERATURE: 98 F | RESPIRATION RATE: 16 BRPM | SYSTOLIC BLOOD PRESSURE: 142 MMHG | DIASTOLIC BLOOD PRESSURE: 87 MMHG | HEART RATE: 65 BPM

## 2024-10-14 DIAGNOSIS — G89.29 CHRONIC LEFT SHOULDER PAIN: ICD-10-CM

## 2024-10-14 DIAGNOSIS — R07.9 CHEST PAIN, UNSPECIFIED TYPE: Primary | ICD-10-CM

## 2024-10-14 DIAGNOSIS — M25.512 CHRONIC LEFT SHOULDER PAIN: ICD-10-CM

## 2024-10-14 LAB
ALBUMIN SERPL BCP-MCNC: 4 G/DL (ref 3.4–5)
ANION GAP SERPL CALC-SCNC: 14 MMOL/L (ref 10–20)
ANION GAP SERPL CALC-SCNC: 15 MMOL/L (ref 10–20)
BASOPHILS # BLD AUTO: 0.03 X10*3/UL (ref 0–0.1)
BASOPHILS NFR BLD AUTO: 0.8 %
BUN SERPL-MCNC: 20 MG/DL (ref 6–23)
BUN SERPL-MCNC: 20 MG/DL (ref 6–23)
CALCIUM SERPL-MCNC: 9.3 MG/DL (ref 8.6–10.6)
CALCIUM SERPL-MCNC: 9.8 MG/DL (ref 8.6–10.6)
CARDIAC TROPONIN I PNL SERPL HS: 4 NG/L (ref 0–53)
CARDIAC TROPONIN I PNL SERPL HS: 4 NG/L (ref 0–53)
CHLORIDE SERPL-SCNC: 104 MMOL/L (ref 98–107)
CHLORIDE SERPL-SCNC: 106 MMOL/L (ref 98–107)
CO2 SERPL-SCNC: 25 MMOL/L (ref 21–32)
CO2 SERPL-SCNC: 26 MMOL/L (ref 21–32)
CREAT SERPL-MCNC: 0.77 MG/DL (ref 0.5–1.3)
CREAT SERPL-MCNC: 0.78 MG/DL (ref 0.5–1.3)
EGFRCR SERPLBLD CKD-EPI 2021: >90 ML/MIN/1.73M*2
EGFRCR SERPLBLD CKD-EPI 2021: >90 ML/MIN/1.73M*2
EOSINOPHIL # BLD AUTO: 0.05 X10*3/UL (ref 0–0.7)
EOSINOPHIL NFR BLD AUTO: 1.3 %
ERYTHROCYTE [DISTWIDTH] IN BLOOD BY AUTOMATED COUNT: 15.5 % (ref 11.5–14.5)
GLUCOSE BLD MANUAL STRIP-MCNC: 106 MG/DL (ref 74–99)
GLUCOSE SERPL-MCNC: 107 MG/DL (ref 74–99)
GLUCOSE SERPL-MCNC: 111 MG/DL (ref 74–99)
HCT VFR BLD AUTO: 47.6 % (ref 41–52)
HGB BLD-MCNC: 14.5 G/DL (ref 13.5–17.5)
IMM GRANULOCYTES # BLD AUTO: 0.02 X10*3/UL (ref 0–0.7)
IMM GRANULOCYTES NFR BLD AUTO: 0.5 % (ref 0–0.9)
LYMPHOCYTES # BLD AUTO: 1.36 X10*3/UL (ref 1.2–4.8)
LYMPHOCYTES NFR BLD AUTO: 36.3 %
MCH RBC QN AUTO: 24.7 PG (ref 26–34)
MCHC RBC AUTO-ENTMCNC: 30.5 G/DL (ref 32–36)
MCV RBC AUTO: 81 FL (ref 80–100)
MONOCYTES # BLD AUTO: 0.51 X10*3/UL (ref 0.1–1)
MONOCYTES NFR BLD AUTO: 13.6 %
NEUTROPHILS # BLD AUTO: 1.78 X10*3/UL (ref 1.2–7.7)
NEUTROPHILS NFR BLD AUTO: 47.5 %
NRBC BLD-RTO: 0 /100 WBCS (ref 0–0)
PHOSPHATE SERPL-MCNC: 4 MG/DL (ref 2.5–4.9)
PLATELET # BLD AUTO: 222 X10*3/UL (ref 150–450)
POTASSIUM SERPL-SCNC: 4.7 MMOL/L (ref 3.5–5.3)
POTASSIUM SERPL-SCNC: 5.9 MMOL/L (ref 3.5–5.3)
RBC # BLD AUTO: 5.86 X10*6/UL (ref 4.5–5.9)
SODIUM SERPL-SCNC: 138 MMOL/L (ref 136–145)
SODIUM SERPL-SCNC: 141 MMOL/L (ref 136–145)
WBC # BLD AUTO: 3.8 X10*3/UL (ref 4.4–11.3)

## 2024-10-14 PROCEDURE — 99285 EMERGENCY DEPT VISIT HI MDM: CPT | Performed by: EMERGENCY MEDICINE

## 2024-10-14 PROCEDURE — 99283 EMERGENCY DEPT VISIT LOW MDM: CPT

## 2024-10-14 PROCEDURE — 85025 COMPLETE CBC W/AUTO DIFF WBC: CPT | Performed by: STUDENT IN AN ORGANIZED HEALTH CARE EDUCATION/TRAINING PROGRAM

## 2024-10-14 PROCEDURE — 2500000005 HC RX 250 GENERAL PHARMACY W/O HCPCS: Mod: SE | Performed by: STUDENT IN AN ORGANIZED HEALTH CARE EDUCATION/TRAINING PROGRAM

## 2024-10-14 PROCEDURE — 93005 ELECTROCARDIOGRAM TRACING: CPT

## 2024-10-14 PROCEDURE — 84484 ASSAY OF TROPONIN QUANT: CPT | Performed by: STUDENT IN AN ORGANIZED HEALTH CARE EDUCATION/TRAINING PROGRAM

## 2024-10-14 PROCEDURE — 80069 RENAL FUNCTION PANEL: CPT | Performed by: STUDENT IN AN ORGANIZED HEALTH CARE EDUCATION/TRAINING PROGRAM

## 2024-10-14 PROCEDURE — 36415 COLL VENOUS BLD VENIPUNCTURE: CPT | Performed by: STUDENT IN AN ORGANIZED HEALTH CARE EDUCATION/TRAINING PROGRAM

## 2024-10-14 PROCEDURE — 82947 ASSAY GLUCOSE BLOOD QUANT: CPT | Mod: 59

## 2024-10-14 PROCEDURE — 80048 BASIC METABOLIC PNL TOTAL CA: CPT | Mod: CCI

## 2024-10-14 PROCEDURE — 82947 ASSAY GLUCOSE BLOOD QUANT: CPT

## 2024-10-14 RX ORDER — LIDOCAINE 560 MG/1
1 PATCH PERCUTANEOUS; TOPICAL; TRANSDERMAL ONCE
Status: DISCONTINUED | OUTPATIENT
Start: 2024-10-14 | End: 2024-10-14 | Stop reason: HOSPADM

## 2024-10-14 ASSESSMENT — PAIN DESCRIPTION - LOCATION: LOCATION: CHEST

## 2024-10-14 ASSESSMENT — PAIN SCALES - GENERAL: PAINLEVEL_OUTOF10: 7

## 2024-10-14 ASSESSMENT — PAIN - FUNCTIONAL ASSESSMENT: PAIN_FUNCTIONAL_ASSESSMENT: 0-10

## 2024-10-14 NOTE — ED TRIAGE NOTES
Pt brought in by ems for cp that started last night, stated it started in his chest and radiated to left shoulder and down arm. Hx dm2, bilat aka

## 2024-10-14 NOTE — ED PROVIDER NOTES
HPI   Chief Complaint   Patient presents with   • Chest Pain       HPI  PMHx of Bilateral AKA, PAD, Anxiety, T2DM, HTN, HLD, presenting via EMS to the emergency department with a chief concern of chest pain.    Patient has had this chest pain on and off for months.  It is located substernally without radiation to the back or arms.  It is always preceded by a twinge in front of his shoulder.  The pain at the front of the shoulder is what is actually bothering him more than the chest pain.  He is interested in what might be causing it and he can do about it.  The left shoulder pain is exacerbated by movement.  Patient has both manual and power wheelchairs, however, his power wheelchair is currently broken.  He did have some shortness of breath last night, however, decided to not report to the emergency department as he was dealing with some bleeding from a scar on his abdomen.  He is not currently having any shortness of breath.  He has been eating, drinking, urinating without difficulty.  He has not had any recent fevers or coughs.    Has not smoked cigarettes in several months.  Prior to that, was smoking a few cigarettes a day.  Denies use of other recreational substances.    Chart review shows that Mr. Macias was seen by pain management for his left shoulder pain on 10/11.  At that time, a plan was made for him to complete 6 weeks of PT prior to returning for reevaluation.    Per records review, patient underwent a TTE, nuclear stress test on 8/15 of this year.  Stress test showed no evidence of ischemia no ECG changes.  TTE with hyperdynamic LV function, normal RV systolic function, aortic valve sclerosis, and mild aortic valve regurgitation.      Patient History   Past Medical History:   Diagnosis Date   • Anxiety    • Diabetes mellitus (Multi)    • HLD (hyperlipidemia)    • Hypertension      Past Surgical History:   Procedure Laterality Date   • CT ABDOMEN PELVIS ANGIOGRAM W AND/OR WO IV CONTRAST  12/16/2022     CT ABDOMEN PELVIS ANGIOGRAM W AND/OR WO IV CONTRAST 12/16/2022 [facility]   • CT ABDOMEN PELVIS ANGIOGRAM W AND/OR WO IV CONTRAST  8/18/2023    CT ABDOMEN PELVIS ANGIOGRAM W AND/OR WO IV CONTRAST 8/18/2023 [facility] [place]   • CT ABDOMEN PELVIS ANGIOGRAM W AND/OR WO IV CONTRAST  9/3/2023    CT ABDOMEN PELVIS ANGIOGRAM W AND/OR WO IV CONTRAST 9/3/2023 [facility] [place]     No family history on file.  Social History     Tobacco Use   • Smoking status: Every Day     Current packs/day: 0.50     Types: Cigarettes   • Smokeless tobacco: Never   Substance Use Topics   • Alcohol use: Not Currently   • Drug use: Never       Physical Exam   ED Triage Vitals [10/14/24 1246]   Temperature Heart Rate Respirations BP   36.7 °C (98 °F) 55 16 140/54      Pulse Ox Temp Source Heart Rate Source Patient Position   99 % Tympanic -- --      BP Location FiO2 (%)     -- --       Physical Exam    General: No apparent acute distress  Head: Normocephalic, atraumatic  Eyes: Sclera anicteric.  Nose: Nares patent without discharge  Mouth: MMM.  Neck: Supple.  Chest: Work of breathing is not increased. Lungs clear to auscultation bilaterally.  Cardiac: Regular rate and rhythm. Normal s1, s2. II VI midsystolic murmur RUSB with radiation to LUSB. Strong pulses.  Abdomen: Soft, distended. Well-healed vertical midline scar with superficially excoriated inferior tip. Palpable subcutaneous duct left chest running cephalo-caudally along entire left side of abdomen (chart review shows history of axillary-profunda goretex bypass)  Extremities: bilateral absence of legs. Able to abduct affected (L) shoulder against resistance.  Neuro: Alert. Interactive with exam. Clear speech. No apparent focal deficits.       ED Course & MDM   ED Course as of 10/14/24 1700   Mon Oct 14, 2024   1317 EKG: Sinus rhythm, 56/min, normal axis.  Borderline prolonged WI.  Overall morphologies, including ST-T wave and QRS morphologies are similar to prior EKGs.  No acute findings  concerning for ischemia [JEANNE]   1518 Unremarkable CBCd. [JEANNE]   1519 BMP with K 5.9 in setting mild hemolysis [JEANNE]   1519 First HS troponin I 4 [JEANNE]   1613 Repeating K and trend troponin w/ plan to discharge if normal [NA]   1640 Repeat trop w/ no elevation and K 4.7 [NA]      ED Course User Index  [JEANNE] Ashanti Pettit MD  [NA] Jacques Campbell MD         Diagnoses as of 10/14/24 1700   Chest pain, unspecified type   Chronic left shoulder pain                 No data recorded     Chitra Coma Scale Score: 15 (10/14/24 1252 : Sarita Flores, CARMELO)                     63 yr old here with one day of substernal chest pain, along with chronic left shoulder pain. The chest pain has occurred similarly at multiple points in the past. No associated difficulty in breathing to raise concern for heart failure. No fever or cough to suggest pneumonia. No trauma or coughing to place at risk of pneumothorax. Unlikely ACS, given recent extensive workup without inducible ischemia, EKG today at his baseline. Despite patient's risk factors (HTN, DMII, PAD, smoking Hx, HTN, HLD), would feel comfortable that this is not ACS after negative serial troponin. Today's murmur on exam is not new - heard by cards in 4/24, with most recent echo in 8/24, so unlikely worsened aortic stenosis or new structural issue. With regards to his left shoulder pain, this is typical to his chronic pain and he has seen pain management for it, with plan to be evaluated and treated by PT for possible rotator cuff pathology. Would be reasonable to expect that his axillary-profunda graft, which runs across the lateral pectoralis, in his indicated area of pain, could be contributing to his pain.    Lidocaine patch for analgesia.    EKG  at baseline. Labs as above. First troponin negative.    Signed out to oncoming colleague pending repeat potassium (first hemolyzed) and second troponin.  Anticipate discharge home.    Impression: Chest pain, Chronic left shoulder  pain.  Disposition: pending.    Medical Decision Making  Medical Decision Making:    The following factors affected the amount/complexity of the data interpreted in this encounter: Reviewed external labs, imaging, ECG, or note, Ordered labs, Ordered ECG, and Independently Interpreted ECG    The following elements of risk factor into this encounter:  Pharmacology: Over the counter medications    Ashanti Pettit MD, PGY-5  Pediatric Emergency Medicine Fellow  10/14/2024  Note may have been written using dictation software. Please excuse transcription errors.     Ashanti Pettit MD  10/14/24 5767
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
## 2024-10-14 NOTE — PROGRESS NOTES
Emergency Department Transition of Care Note       Signout   I received Link Macias in signout from Dr. Pettit.  Please see the ED Provider Note for all HPI, PE and MDM up to the time of signout at 1500.  This is in addition to the primary record.    In brief Link Macias is an 63 y.o. male presenting for chest pain.    At the time of signout we were awaiting:  Troponin trend, repeat potassium given hemolysis.    ED Course & Medical Decision Making   Medical Decision Making:  Under my care, troponin was unchanged from baseline value of 4. Low suspicion for ACS given lack of troponin elevation and stable EKG. Repeat RFP showed potassium of 4.7. Pt was resting comfortably in bed with improvement of chest and left shoulder pain with lidocaine patch. Likely diagnosis is acute flare of chronic L shoulder pain which he follows with pain management for. Pt felt comfortable for discharge with follow up with pain management and primary for L shoulder pain. Instructed to return to ED if other concerning symptoms arise.     I reviewed the case with the attending ED physician Dr. Wright. The attending ED physician agrees with the plan. Patient and/or patient’s representative was counseled regarding labs, imaging, likely diagnosis, and plan. All questions were answered.            ED Course:  ED Course as of 10/14/24 1837   Mon Oct 14, 2024   1317 EKG: Sinus rhythm, 56/min, normal axis.  Borderline prolonged AR.  Overall morphologies, including ST-T wave and QRS morphologies are similar to prior EKGs.  No acute findings concerning for ischemia [JEANNE]   1518 Unremarkable CBCd. [JEANNE]   1519 BMP with K 5.9 in setting mild hemolysis [JEANNE]   1519 First HS troponin I 4 [JEANNE]   1613 Repeating K and trend troponin w/ plan to discharge if normal [NA]   1640 Repeat trop w/ no elevation and K 4.7 [NA]      ED Course User Index  [JEANNE] Ashanti Pettit MD  [NA] Jacques Campbell MD         Diagnoses as of 10/14/24 1837   Chest pain,  unspecified type   Chronic left shoulder pain       Disposition   As a result of the work-up, the patient was discharged home.  he was informed of his diagnosis and instructed to come back with any concerns or worsening of condition.  he and was agreeable to the plan as discussed above.  he was given the opportunity to ask questions.  All of the patient's questions were answered.    Procedures   Procedures    Patient seen and discussed with ED attending physician.    Jacques Campbell MD  Emergency Medicine

## 2024-10-14 NOTE — DISCHARGE INSTRUCTIONS
Your chest pain is likely related to your left shoulder pain, continue treatment for shoulder pain with lidocaine patch at home.     Please return to the ED if you experience the following:  Signs of heart attack, which may include:  Severe chest pain, pressure, or discomfort with:  Breathing trouble; sweating; upset stomach; or cold, clammy skin.  Pain in your arms, back, or jaw.  Worse pain with activity like walking up stairs.  Fast or irregular heartbeat.  Feeling dizzy, faint, or weak.

## 2024-10-15 LAB
ATRIAL RATE: 56 BPM
P AXIS: 54 DEGREES
P OFFSET: 167 MS
P ONSET: 124 MS
PR INTERVAL: 206 MS
Q ONSET: 227 MS
QRS COUNT: 10 BEATS
QRS DURATION: 70 MS
QT INTERVAL: 380 MS
QTC CALCULATION(BAZETT): 366 MS
QTC FREDERICIA: 371 MS
R AXIS: 33 DEGREES
T AXIS: 136 DEGREES
T OFFSET: 417 MS
VENTRICULAR RATE: 56 BPM

## 2024-10-17 ENCOUNTER — HOSPITAL ENCOUNTER (EMERGENCY)
Facility: HOSPITAL | Age: 64
Discharge: HOME | End: 2024-10-18
Attending: STUDENT IN AN ORGANIZED HEALTH CARE EDUCATION/TRAINING PROGRAM
Payer: MEDICAID

## 2024-10-17 VITALS
HEART RATE: 71 BPM | BODY MASS INDEX: 62.74 KG/M2 | TEMPERATURE: 97.2 F | RESPIRATION RATE: 16 BRPM | DIASTOLIC BLOOD PRESSURE: 56 MMHG | WEIGHT: 150 LBS | SYSTOLIC BLOOD PRESSURE: 152 MMHG | OXYGEN SATURATION: 99 %

## 2024-10-17 DIAGNOSIS — L30.9 ECZEMA, UNSPECIFIED TYPE: ICD-10-CM

## 2024-10-17 DIAGNOSIS — R21 RASH: Primary | ICD-10-CM

## 2024-10-17 PROCEDURE — 99284 EMERGENCY DEPT VISIT MOD MDM: CPT | Performed by: STUDENT IN AN ORGANIZED HEALTH CARE EDUCATION/TRAINING PROGRAM

## 2024-10-17 PROCEDURE — 99283 EMERGENCY DEPT VISIT LOW MDM: CPT | Performed by: STUDENT IN AN ORGANIZED HEALTH CARE EDUCATION/TRAINING PROGRAM

## 2024-10-17 ASSESSMENT — PAIN SCALES - GENERAL: PAINLEVEL_OUTOF10: 0 - NO PAIN

## 2024-10-17 ASSESSMENT — PAIN - FUNCTIONAL ASSESSMENT: PAIN_FUNCTIONAL_ASSESSMENT: 0-10

## 2024-10-18 ENCOUNTER — HOME CARE VISIT (OUTPATIENT)
Dept: HOME HEALTH SERVICES | Facility: HOME HEALTH | Age: 64
End: 2024-10-18
Payer: MEDICAID

## 2024-10-18 ENCOUNTER — APPOINTMENT (OUTPATIENT)
Dept: RADIOLOGY | Facility: HOSPITAL | Age: 64
End: 2024-10-18
Payer: MEDICAID

## 2024-10-18 ENCOUNTER — HOSPITAL ENCOUNTER (EMERGENCY)
Facility: HOSPITAL | Age: 64
Discharge: HOME | End: 2024-10-19
Attending: EMERGENCY MEDICINE
Payer: MEDICAID

## 2024-10-18 DIAGNOSIS — R07.9 CHEST PAIN, UNSPECIFIED TYPE: Primary | ICD-10-CM

## 2024-10-18 LAB
ALBUMIN SERPL BCP-MCNC: 4.3 G/DL (ref 3.4–5)
ALP SERPL-CCNC: 98 U/L (ref 33–136)
ALT SERPL W P-5'-P-CCNC: 15 U/L (ref 10–52)
ANION GAP SERPL CALC-SCNC: 15 MMOL/L (ref 10–20)
AST SERPL W P-5'-P-CCNC: 20 U/L (ref 9–39)
BASOPHILS # BLD AUTO: 0.03 X10*3/UL (ref 0–0.1)
BASOPHILS NFR BLD AUTO: 0.8 %
BILIRUB SERPL-MCNC: 0.2 MG/DL (ref 0–1.2)
BUN SERPL-MCNC: 24 MG/DL (ref 6–23)
CALCIUM SERPL-MCNC: 9.2 MG/DL (ref 8.6–10.6)
CARDIAC TROPONIN I PNL SERPL HS: 5 NG/L (ref 0–53)
CHLORIDE SERPL-SCNC: 106 MMOL/L (ref 98–107)
CO2 SERPL-SCNC: 23 MMOL/L (ref 21–32)
CREAT SERPL-MCNC: 0.77 MG/DL (ref 0.5–1.3)
EGFRCR SERPLBLD CKD-EPI 2021: >90 ML/MIN/1.73M*2
EOSINOPHIL # BLD AUTO: 0.06 X10*3/UL (ref 0–0.7)
EOSINOPHIL NFR BLD AUTO: 1.5 %
ERYTHROCYTE [DISTWIDTH] IN BLOOD BY AUTOMATED COUNT: 15.7 % (ref 11.5–14.5)
GLUCOSE BLD MANUAL STRIP-MCNC: 191 MG/DL (ref 74–99)
GLUCOSE SERPL-MCNC: 112 MG/DL (ref 74–99)
HCT VFR BLD AUTO: 46.9 % (ref 41–52)
HGB BLD-MCNC: 14.1 G/DL (ref 13.5–17.5)
IMM GRANULOCYTES # BLD AUTO: 0.01 X10*3/UL (ref 0–0.7)
IMM GRANULOCYTES NFR BLD AUTO: 0.3 % (ref 0–0.9)
LYMPHOCYTES # BLD AUTO: 1.51 X10*3/UL (ref 1.2–4.8)
LYMPHOCYTES NFR BLD AUTO: 38.3 %
MAGNESIUM SERPL-MCNC: 2.05 MG/DL (ref 1.6–2.4)
MCH RBC QN AUTO: 24.2 PG (ref 26–34)
MCHC RBC AUTO-ENTMCNC: 30.1 G/DL (ref 32–36)
MCV RBC AUTO: 81 FL (ref 80–100)
MONOCYTES # BLD AUTO: 0.69 X10*3/UL (ref 0.1–1)
MONOCYTES NFR BLD AUTO: 17.5 %
NEUTROPHILS # BLD AUTO: 1.64 X10*3/UL (ref 1.2–7.7)
NEUTROPHILS NFR BLD AUTO: 41.6 %
NRBC BLD-RTO: 0 /100 WBCS (ref 0–0)
PHOSPHATE SERPL-MCNC: 3.4 MG/DL (ref 2.5–4.9)
PLATELET # BLD AUTO: 219 X10*3/UL (ref 150–450)
POTASSIUM SERPL-SCNC: 4.3 MMOL/L (ref 3.5–5.3)
PROT SERPL-MCNC: 7.1 G/DL (ref 6.4–8.2)
RBC # BLD AUTO: 5.82 X10*6/UL (ref 4.5–5.9)
SODIUM SERPL-SCNC: 140 MMOL/L (ref 136–145)
WBC # BLD AUTO: 3.9 X10*3/UL (ref 4.4–11.3)

## 2024-10-18 PROCEDURE — 84484 ASSAY OF TROPONIN QUANT: CPT

## 2024-10-18 PROCEDURE — 71046 X-RAY EXAM CHEST 2 VIEWS: CPT | Performed by: SURGERY

## 2024-10-18 PROCEDURE — 71046 X-RAY EXAM CHEST 2 VIEWS: CPT

## 2024-10-18 PROCEDURE — 2500000001 HC RX 250 WO HCPCS SELF ADMINISTERED DRUGS (ALT 637 FOR MEDICARE OP): Mod: SE

## 2024-10-18 PROCEDURE — 82947 ASSAY GLUCOSE BLOOD QUANT: CPT

## 2024-10-18 PROCEDURE — 85025 COMPLETE CBC W/AUTO DIFF WBC: CPT

## 2024-10-18 PROCEDURE — 82565 ASSAY OF CREATININE: CPT

## 2024-10-18 PROCEDURE — 99283 EMERGENCY DEPT VISIT LOW MDM: CPT | Mod: 25

## 2024-10-18 PROCEDURE — 83735 ASSAY OF MAGNESIUM: CPT

## 2024-10-18 PROCEDURE — 83880 ASSAY OF NATRIURETIC PEPTIDE: CPT

## 2024-10-18 PROCEDURE — 36415 COLL VENOUS BLD VENIPUNCTURE: CPT

## 2024-10-18 PROCEDURE — 84100 ASSAY OF PHOSPHORUS: CPT

## 2024-10-18 RX ORDER — IBUPROFEN 400 MG/1
800 TABLET ORAL ONCE
Status: COMPLETED | OUTPATIENT
Start: 2024-10-18 | End: 2024-10-18

## 2024-10-18 RX ADMIN — IBUPROFEN 800 MG: 400 TABLET, FILM COATED ORAL at 23:01

## 2024-10-18 ASSESSMENT — PAIN SCALES - GENERAL
PAINLEVEL_OUTOF10: 8
PAINLEVEL_OUTOF10: 10 - WORST POSSIBLE PAIN
PAINLEVEL_OUTOF10: 10 - WORST POSSIBLE PAIN

## 2024-10-18 ASSESSMENT — PAIN DESCRIPTION - DESCRIPTORS: DESCRIPTORS: SHARP

## 2024-10-18 ASSESSMENT — PAIN DESCRIPTION - LOCATION: LOCATION: CHEST

## 2024-10-18 ASSESSMENT — PAIN DESCRIPTION - PAIN TYPE: TYPE: ACUTE PAIN

## 2024-10-18 ASSESSMENT — PAIN - FUNCTIONAL ASSESSMENT: PAIN_FUNCTIONAL_ASSESSMENT: 0-10

## 2024-10-18 NOTE — ED TRIAGE NOTES
Patient stated rash appeared on bilateral arms and chest a few days ago. Patient complains of rash being itchy.

## 2024-10-18 NOTE — DISCHARGE INSTRUCTIONS
You were seen in the ED for your rash. On exam, it does not appear that the rash is concerning for anything serious at this time, it appears most likely to be eczema but we recommend you follow up with a dermatologist to get more definitive care for the rash. If you have any immediate concerns please return to the ED

## 2024-10-18 NOTE — ED PROVIDER NOTES
Emergency Department Provider Note        History of Present Illness     History provided by: Patient  Limitations to History: None    HPI:  Link Macias is a 63 y.o. male presenting with a chronic rash. Describes the rash as itchy and says it has been there for months. Reports its over his arms, shoulders, and chest. Says he has been given creams in the past but they have not helped. Denies any other symptoms at this time. No SOB, chest pain or fevers/chills.    Physical Exam   Triage vitals:  T 36.2 °C (97.2 °F)  HR 71  /56  RR 16  O2 99 % None (Room air)    General: Awake, alert, in no acute distress  CV: Regular rate, regular rhythm. Radial pulses 2+ bilaterally  Resp: Breathing non-labored, speaking in full sentences.  Clear to auscultation bilaterally  GI: Soft, non-distended, non-tender. No rebound or guarding.  MSK/Extremities: Bilateral amputee  Skin: Evidence of dry, flaky skin, most consistent with chronic eczema over upper extremity extensor surfaces but also on his arms, abdomen and chest. No evidence of acute rash. No bullae or blisters  noted. No mucosal involvement. neg Nikolsky sign   Neuro: Alert. Oriented. Face symmetric. Speech is fluent.  Gross strength and sensation intact in b/l UE    Medical Decision Making & ED Course   Medical Decision Makin y.o. male presenting with what appears to be chronic eczema. On exam he is stable, comfortable at rest. No evidence of concerning dermatological findings, most consistent with chronic eczema.   No evidence of infection, cellulitis, allergic reaction or non blanching rash. No mucosal involvement.  Patient says he has been given creams before but they have no worked.    Gave patient a referral to dermatology and discussed plan with patient to follow up with them for further management of his skin disorder, and importance of using moisturizers in the meantime.   ----      Differential diagnoses considered include but are not limited to:  SJS/TEN, SSSS, pemphigus vulgaris, eczema, cellulitis     Social Determinants of Health which Significantly Impact Care: Housing insecurity         Chronic conditions affecting the patient's care: As documented above in [facility]    The patient was discussed with the following consultants/services: None    Care Considerations: As documented above in [facility]    ED Course:  ED Course as of 10/18/24 0231   Thu Oct 17, 2024   2320 Attending summary:  64 y/o M who presents for a rash. He reports it has been present for months. It is located on his bilateral arms, chest and abd. It's itchy, not painful. No involvement to his eyes or mouth. No fevers, chills, n/v. Has tried creams but cannot say which ones. Hasn't seen derm. No new products he knows of.    Vitals unremarkable. Exam shwos chronically ill but nontoxic male with diffuse maculopapular rash. No peteichae or purpura, neg Nikolsky sign, no bullae or blisters. Normal lip and oropharyngeal exam.    Appears most likely eczema and/or dry skin. No concern for SJS/TEN, SSSS, pemphigus vulgaris or any other life-threatening rash. Discussed moisturizing and will give derm referral. Dc home.  [SS]      ED Course User Index  [SS] Sasha Arroyo MD         Diagnoses as of 10/18/24 0231   Rash   Eczema, unspecified type     Disposition   As a result of the work-up, the patient was discharged home.  he was informed of his diagnosis and instructed to come back with any concerns or worsening of condition.  he and was agreeable to the plan as discussed above.  he was given the opportunity to ask questions.  All of the patient's questions were answered.    Procedures   Procedures    Patient seen and discussed with ED attending physician.    Ernie Chicas MD  Emergency Medicine      Ernie Chicas MD  Resident  10/18/24 6106

## 2024-10-19 VITALS
SYSTOLIC BLOOD PRESSURE: 142 MMHG | TEMPERATURE: 97.6 F | HEART RATE: 66 BPM | OXYGEN SATURATION: 99 % | WEIGHT: 150 LBS | BODY MASS INDEX: 62.74 KG/M2 | DIASTOLIC BLOOD PRESSURE: 62 MMHG | RESPIRATION RATE: 16 BRPM

## 2024-10-19 LAB
BNP SERPL-MCNC: 15 PG/ML (ref 0–99)
CARDIAC TROPONIN I PNL SERPL HS: 8 NG/L (ref 0–53)

## 2024-10-19 PROCEDURE — 36415 COLL VENOUS BLD VENIPUNCTURE: CPT

## 2024-10-19 PROCEDURE — 2500000001 HC RX 250 WO HCPCS SELF ADMINISTERED DRUGS (ALT 637 FOR MEDICARE OP): Mod: SE

## 2024-10-19 PROCEDURE — 84484 ASSAY OF TROPONIN QUANT: CPT

## 2024-10-19 RX ORDER — ACETAMINOPHEN 325 MG/1
TABLET ORAL
Status: COMPLETED
Start: 2024-10-19 | End: 2024-10-19

## 2024-10-19 RX ORDER — ACETAMINOPHEN 325 MG/1
650 TABLET ORAL ONCE
Status: COMPLETED | OUTPATIENT
Start: 2024-10-19 | End: 2024-10-19

## 2024-10-19 RX ADMIN — ACETAMINOPHEN 650 MG: 325 TABLET ORAL at 09:49

## 2024-10-19 ASSESSMENT — PAIN DESCRIPTION - LOCATION: LOCATION: HIP

## 2024-10-19 ASSESSMENT — LIFESTYLE VARIABLES
HAVE PEOPLE ANNOYED YOU BY CRITICIZING YOUR DRINKING: NO
EVER HAD A DRINK FIRST THING IN THE MORNING TO STEADY YOUR NERVES TO GET RID OF A HANGOVER: NO
HAVE YOU EVER FELT YOU SHOULD CUT DOWN ON YOUR DRINKING: NO
TOTAL SCORE: 0
EVER FELT BAD OR GUILTY ABOUT YOUR DRINKING: NO

## 2024-10-19 ASSESSMENT — PAIN - FUNCTIONAL ASSESSMENT
PAIN_FUNCTIONAL_ASSESSMENT: 0-10
PAIN_FUNCTIONAL_ASSESSMENT: 0-10

## 2024-10-19 ASSESSMENT — PAIN SCALES - GENERAL
PAINLEVEL_OUTOF10: 0 - NO PAIN
PAINLEVEL_OUTOF10: 5 - MODERATE PAIN
PAINLEVEL_OUTOF10: 0 - NO PAIN

## 2024-10-19 ASSESSMENT — PAIN DESCRIPTION - ORIENTATION: ORIENTATION: RIGHT

## 2024-10-19 NOTE — ED TRIAGE NOTES
Patient states that he has chest pain that started 20 minutes ago. Patient describes the pain as sharp.

## 2024-10-19 NOTE — ED PROVIDER NOTES
History of Present Illness     History provided by: Patient  Limitations to History: None  External Records Reviewed with Brief Summary: Radiology report of stress test from 2024 which showed no evidence of ischemia    HPI:  Link Macias is a 63 y.o. male With a past medical history of bilateral AKA, peripheral artery disease, anxiety, type 2 diabetes, hypertension, HLD who is presenting today with chest pain.  Patient is reporting the chest pain began around 8 PM.  Describing it as left-sided as well as radiating into her shoulder.  He reports that is similar to prior.  He reports that he was here yesterday for a rash.  He reports that the rash does not have much better and it does itch.  Denies nausea, vomiting, diaphoresis.  Patient has bilateral above-the-knee amputations.  Patient reports that he is currently caring for himself at home.  Denies fevers, chills, cough.    Physical Exam   Triage vitals:  T 36.1 °C (96.9 °F)  HR 81  /67  RR 18  O2 99 %      General: Awake, alert, in no acute distress  Eyes: Gaze conjugate.  No scleral icterus or injection  HENT: Normo-cephalic, atraumatic. No stridor  CV: Regular rate, regular rhythm. Radial pulses 2+ bilaterally  Resp: Breathing non-labored, speaking in full sentences.  Clear to auscultation bilaterally  GI: Soft, non-distended, non-tender. No rebound or guarding.  MSK/Extremities: No gross bony deformities. Moving all extremities.  2+ radial pulses bilaterally.  Patient is able to lift his left shoulder.  Pain to palpation overlying the AC joint.  Skin: Warm. Appropriate color  Neuro: Alert. Oriented. Face symmetric. Speech is fluent.  Gross strength and sensation intact in b/l UE and LEs  Psych: Appropriate mood and affect      Medical Decision Making & ED Course   Medical Decision Makin y.o. male  With a past medical history of bilateral AKA, peripheral artery disease, anxiety, type 2 diabetes, hypertension, HLD who is presenting today  with chest pain. Vitals were reviewed and patient was hemodynamically stable with a sugar that was 191.  Patient did have a negative nuclear stress test from August that was independently reviewed.  Concern for ACS given that this was negative.  Patient is not tachycardic and not hypoxic and given the above-the-knee amputation low concern for a PE.  Did consider aortic pathology however patient has good pulses in the upper extremities.  Mild tenderness.  No back tenderness.  ----      Differential diagnoses considered include but are not limited to: in MDM     Social Determinants of Health which Significantly Impact Care: None identified     EKG Independent Interpretation: EKG interpreted by myself. Please see ED Course and [facility] for full interpretation.    Independent Result Review and Interpretation: Results were independently reviewed and interpreted by myself. Please see ED course and [facility] for full interpretation.    Chronic conditions affecting the patient's care: As documented in the MDM    The patient was discussed with the following consultants/services: None    Care Considerations: As per [facility]    ED Course:  ED Course as of 10/19/24 0201   Sat Oct 19, 2024   0152 EKG was independently reviewed which showed sinus rhythm at a rate 83.  Intervals within normal limits no elevations or inversions. [LAUREL]   0152 Patient's lab work was independently reviewed which showed a mild leukopenia.  CMP was unremarkable.  Troponins were negative BNP was negative electrolytes were otherwise unremarkable.  Patient was given Motrin for symptomatic control.  On reevaluation patient was reporting symptomatic improvement [LAUREL]      ED Course User Index  [LAUREL] Nadja Duenas MD         Diagnoses as of 10/19/24 0201   Chest pain, unspecified type     Disposition   As a result of the work-up, the patient was discharged home.  he was informed of his diagnosis and instructed to come back with any concerns or worsening of condition.  he and  was agreeable to the plan as discussed above.  he was given the opportunity to ask questions.  All of the patient's questions were answered.    Procedures   Procedures    Patient seen and discussed with ED attending physician.    Nadja Duenas MD  Emergency Medicine     Nadja Duenas MD  Resident  10/19/24 1131

## 2024-10-19 NOTE — DISCHARGE INSTRUCTIONS
You were evaluated in the Emergency Department today for chest pain. Your evaluation has shown no signs of medical conditions requiring emergent intervention at this time, however we recommend that you follow up with your primary care physician or your cardiologist as soon as possible for further testing as an outpatient including stress testing.     Please schedule an appointment for follow up with your primary care physician as soon as possible. If you do not have a primary care doctor you may call [phone] to make an appointment.    Return to the Emergency Department if you experience worsening or uncontrolled chest pain, shortness of breath, light headedness, feeling faint, nausea, vomiting, or any other concerning symptoms.    Thank you for choosing us for your care.

## 2024-10-20 ASSESSMENT — ACTIVITIES OF DAILY LIVING (ADL)
OASIS_M1830: 05
HOME_HEALTH_OASIS: 01

## 2024-10-21 ENCOUNTER — HOSPITAL ENCOUNTER (EMERGENCY)
Facility: HOSPITAL | Age: 64
Discharge: HOME | End: 2024-10-22
Payer: MEDICAID

## 2024-10-21 DIAGNOSIS — H10.31 ACUTE BACTERIAL CONJUNCTIVITIS OF RIGHT EYE: ICD-10-CM

## 2024-10-21 DIAGNOSIS — R21 RASH: Primary | ICD-10-CM

## 2024-10-21 PROCEDURE — 2500000004 HC RX 250 GENERAL PHARMACY W/ HCPCS (ALT 636 FOR OP/ED): Mod: SE | Performed by: NURSE PRACTITIONER

## 2024-10-21 PROCEDURE — 96372 THER/PROPH/DIAG INJ SC/IM: CPT | Performed by: NURSE PRACTITIONER

## 2024-10-21 PROCEDURE — 99283 EMERGENCY DEPT VISIT LOW MDM: CPT

## 2024-10-21 PROCEDURE — 99284 EMERGENCY DEPT VISIT MOD MDM: CPT | Performed by: NURSE PRACTITIONER

## 2024-10-21 RX ORDER — POLYMYXIN B SULFATE AND TRIMETHOPRIM 1; 10000 MG/ML; [USP'U]/ML
1 SOLUTION OPHTHALMIC EVERY 8 HOURS
Qty: 10 ML | Refills: 0 | Status: SHIPPED | OUTPATIENT
Start: 2024-10-21 | End: 2024-10-21

## 2024-10-21 RX ORDER — PREDNISONE 20 MG/1
40 TABLET ORAL DAILY
Qty: 10 TABLET | Refills: 0 | Status: SHIPPED | OUTPATIENT
Start: 2024-10-21 | End: 2024-10-26

## 2024-10-21 RX ORDER — POLYMYXIN B SULFATE AND TRIMETHOPRIM 1; 10000 MG/ML; [USP'U]/ML
1 SOLUTION OPHTHALMIC EVERY 8 HOURS
Qty: 10 ML | Refills: 0 | Status: SHIPPED | OUTPATIENT
Start: 2024-10-21 | End: 2024-10-31

## 2024-10-21 RX ORDER — PREDNISONE 20 MG/1
40 TABLET ORAL DAILY
Qty: 10 TABLET | Refills: 0 | Status: SHIPPED | OUTPATIENT
Start: 2024-10-21 | End: 2024-10-21

## 2024-10-21 RX ORDER — KETOROLAC TROMETHAMINE 15 MG/ML
15 INJECTION, SOLUTION INTRAMUSCULAR; INTRAVENOUS ONCE
Status: COMPLETED | OUTPATIENT
Start: 2024-10-21 | End: 2024-10-21

## 2024-10-21 ASSESSMENT — PAIN - FUNCTIONAL ASSESSMENT: PAIN_FUNCTIONAL_ASSESSMENT: 0-10

## 2024-10-21 ASSESSMENT — PAIN DESCRIPTION - PAIN TYPE: TYPE: CHRONIC PAIN

## 2024-10-21 ASSESSMENT — PAIN SCALES - GENERAL: PAINLEVEL_OUTOF10: 2

## 2024-10-21 ASSESSMENT — PAIN DESCRIPTION - LOCATION: LOCATION: HIP

## 2024-10-21 NOTE — PROGRESS NOTES
Link Macias is a 63 y.o. male. Patient has been seen recently by this SW, who at that time was enrolled in ED to Home program, with [facility]. Patient had shared good follow up which was reflected in his chart. ARIANNE will confirm with [facility] ED2H team that patient is properly connected, and inform of his discharge today for coordination of care. Ride requested for patient in Round Trip. Patient has [place] Medicaid, and needs stretcher transport home. ARIANNE will update with ETA.   *Addendum* [facility] @ 1:00 AM. SW touched base with [facility], who will follow up with patient when he is home to continue services. SW reviewed patient's upcoming outpatient appointments with him.     10/21/24 5843   Discharge Planning   Assistance Needed Safe discharge plan - Home ([address])  - [address]   [place] [id number]   Type of Residence Private residence;Home care staff  (HC; [facility] Building ([place]))   Number of Stairs to Enter Residence 0  (Elevator)   Number of Stairs Within Residence 0   Who is requesting discharge planning? Other (Comment)  (RN; patient is medically ready)   Home or Post Acute Services Community services  (SW will ensure patient has [facility] that was set up ~2wk ago; Patient typically has good follow-up.)   Expected Discharge Disposition Home   Does the patient need discharge transport arranged? Yes   RoundTrip coordination needed? Yes   Has discharge transport been arranged? No   What day is the transport expected? 10/21/24   What time is the transport expected? 2273

## 2024-10-21 NOTE — ED PROVIDER NOTES
"HPI   Chief Complaint   Patient presents with    Rash         History provided by:  Patient   used: No      63-year-old male who is well-known to our emergency department for multiple presentations for a generalized itchy rash that causes pain due to the itching presents the ED for reevaluation of this rash.  He denies any new complaints at this rash at this time.  Denies any fevers, chills, nausea, vomiting or difficulty breathing.  Denies any new known precipitating factors.  Has taken hydrocortisone cream at home without improvement and states that he did see dermatology but they did nothing for him and is wondering if he can have \"rash testing.\"  Additionally states that he believes that he has pinkeye of the right eye onset a couple days ago.  Endorses erythema, itching and clear to yellow drainage.  Take no medications for the symptoms at home.  Does not wear contacts.  Denies any painful vision loss, vision changes, trauma or foreign body to the eye.  Denies any additional complaints or symptoms at this time.    ROS is otherwise negative unless stated above      Patient History   Past Medical History:   Diagnosis Date    Anxiety     Diabetes mellitus (Multi)     HLD (hyperlipidemia)     Hypertension      Past Surgical History:   Procedure Laterality Date    CT ABDOMEN PELVIS ANGIOGRAM W AND/OR WO IV CONTRAST  12/16/2022    CT ABDOMEN PELVIS ANGIOGRAM W AND/OR WO IV CONTRAST 12/16/2022 [facility]    CT ABDOMEN PELVIS ANGIOGRAM W AND/OR WO IV CONTRAST  8/18/2023    CT ABDOMEN PELVIS ANGIOGRAM W AND/OR WO IV CONTRAST 8/18/2023 [facility] [place] [facility]    CT ABDOMEN PELVIS ANGIOGRAM W AND/OR WO IV CONTRAST  9/3/2023    CT ABDOMEN PELVIS ANGIOGRAM W AND/OR WO IV CONTRAST 9/3/2023 [facility] [place] [facility]     No family history on file.  Social History     Tobacco Use    Smoking status: Every Day     Current packs/day: 0.50     Types: Cigarettes    Smokeless tobacco: Never   Vaping Use    Vaping status: Never Used "   Substance Use Topics    Alcohol use: Not Currently    Drug use: Never       Physical Exam   ED Triage Vitals   Temperature Heart Rate Respirations BP   10/21/24 1225 10/21/24 1225 10/21/24 1225 10/21/24 1225   36.9 °C (98.5 °F) 61 18 171/69      Pulse Ox Temp src Heart Rate Source Patient Position   10/21/24 1225 -- 10/21/24 1536 10/21/24 1536   100 %  Monitor Sitting      BP Location FiO2 (%)     10/21/24 1536 --     Left arm        Physical Exam  VS: As documented in the triage note and EMR flowsheet from this visit were reviewed.    GEN: NAD, nontoxic, well-appearing, ambulates without difficulty  EYES:  EOMs grossly intact, injected conjunctiva and purulent drainage from the right eye consistent with conjunctivitis.  Left eye normal.  No nystagmus noted.  No foreign body or trauma noted.  No evidence of preseptal or septal cellulitis.   HEENT: Airway patent, ears with clear tympanic membranes bilaterally. Nasal mucosa clear. Mouth with normal mucosa.  No area of abscess or fluctuance noted.  No drainage noted. Throat has no vesicles, no oropharyngeal exudates and uvula is midline. Face with no lymph node enlargement. No trismus or drooling noted.  Handling secretions.  Speech clear.  CARD: RRR, nontender chest, no crepitus deformities, no JVD, no murmurs rubs or gallops ; No edema noted.  Positive pulses bilaterally throughout.  Capillary refill less than 3 seconds.  No abnormal redness, warmth, tenderness or swelling noted to bilateral lower extremities.  PULMONARY: Clear all lung fields. Moving air well, Nonlabored, no accessory muscle use, able to speak complete sentences  ABDOMEN: Abdomen soft, non-distended, no rebound, no guarding. Bowel sounds normal in all 4 quadrants. No tenderness to palpation.  No CVA tenderness.  No masses or organomegaly noted.  No evidence of peritonitis. Negative Lovelace's and McBurney's point tenderness.  No Rovsing's.  : deferred  MUSK: Spine appears normal, range of motion  is not limited, no muscle or joint tenderness. Strength 5 out of 5 equal bilaterally throughout.  No step-offs, deformities or additional signs of trauma noted.  No spinal/midline tenderness to palpation  SKIN: Skin normal color for race, warm, dry and intact. No evidence of trauma. No rash noted.  NEURO: Alert and oriented x 3, speech is clear, no obvious deficits noted. No facial droop noted.  Speech clear.  Negative Kernig's and Brudzinski sign.  PSYCH: Alert and oriented to person, place, time/situation. normal mood and affect. No apparent risk to self or others. Thoughts are linear.  Does not appear decompensated.  Does not appear internally stimulated.  LYMPH: No adenopathy or splenomegaly. No cervical, supraclavicular or inguinal lymphadenopathy.    ED Course & MDM   Diagnoses as of 10/21/24 1641   Rash   Acute bacterial conjunctivitis of right eye                 No data recorded     Chitra Coma Scale Score: 15 (10/21/24 1225 : Jovanny Hardwick RN)                           Medical Decision Making    upon assessment patient was a chronically ill non-toxic appearing male in no apparent distress. airway intact. Patient is neurovascular intact.  No evidence of anaphylaxis or emergent rash.  Rash is consistent with prior visitations.  Additionally he has conjunctivitis of the right eye.  See physical exam section for my assessment.  Due to prior history and current physical exam, I deemed no basic laboratory or radiologic studies were necessary at this time.  I was not concerned for any acute systemic infection, abscess, eye trauma or additional emergent etiology at this time due to his assessment and hemodynamic stability.  Patient was offered Toradol IM with improvement of his symptoms while in the emergency department.  Patient remained hemodynamically stable throughout ED visit.  Previous consult/ED visit notes were reviewed.  I reviewed his multiple presentations of the last couple months for the same exact  symptoms.  No indication for repeat workups at this time.  His assessment is consistent with these and is otherwise reassuring.  Findings were discussed with patient and patient agreeable for plan to discharge home with primary care provider and dermatology follow-up in one week for further management and to take Tylenol and  ibuprofen by mouth at home as needed for pain or fever.  patient was educated that they could also use warm compresses at home as needed for additional pain management  educated on signs and symptoms of worsening eye infection. Prior medications reviewed and Patient prescribed Poly trim eyedrops for conjunctivitis treatment and a prednisone burst course for rash treatment after shared discussion despite the patient having diabetes.  Educated to not rub the eye.  Educated to take his already other prescribed medications at home for this rash.  Return precautions discussed and patient acknowledged understanding.  All questions and concerns answered prior to discharge.                        *Please note that portions of this note may have been completed with a voice recognition program.  Efforts were made to edit the dictations but occasionally, words are mis-transcribed.    Procedure  Procedures     Gwendolyn Crespo, CHRISTEN-QI  10/21/24 8180

## 2024-10-21 NOTE — ED TRIAGE NOTES
Patient presents to the Emergency department with a chief complaint of diffuse body rash and pain, patient states has been going on for a while.

## 2024-10-22 VITALS
HEIGHT: 60 IN | WEIGHT: 150 LBS | OXYGEN SATURATION: 100 % | TEMPERATURE: 98.6 F | BODY MASS INDEX: 29.45 KG/M2 | RESPIRATION RATE: 18 BRPM | SYSTOLIC BLOOD PRESSURE: 149 MMHG | HEART RATE: 65 BPM | DIASTOLIC BLOOD PRESSURE: 66 MMHG

## 2024-10-22 PROCEDURE — 2500000001 HC RX 250 WO HCPCS SELF ADMINISTERED DRUGS (ALT 637 FOR MEDICARE OP): Mod: SE | Performed by: EMERGENCY MEDICINE

## 2024-10-22 RX ORDER — ACETAMINOPHEN 325 MG/1
650 TABLET ORAL ONCE
Status: COMPLETED | OUTPATIENT
Start: 2024-10-22 | End: 2024-10-22

## 2024-10-24 ENCOUNTER — DOCUMENTATION (OUTPATIENT)
Dept: HOME HEALTH SERVICES | Facility: HOME HEALTH | Age: 64
End: 2024-10-24

## 2024-10-24 ENCOUNTER — OFFICE VISIT (OUTPATIENT)
Dept: DERMATOLOGY | Facility: CLINIC | Age: 64
End: 2024-10-24
Payer: MEDICAID

## 2024-10-24 ENCOUNTER — HOME HEALTH ADMISSION (OUTPATIENT)
Dept: HOME HEALTH SERVICES | Facility: HOME HEALTH | Age: 64
End: 2024-10-24
Payer: MEDICAID

## 2024-10-24 DIAGNOSIS — L30.9 DERMATITIS: Primary | ICD-10-CM

## 2024-10-24 PROCEDURE — 99214 OFFICE O/P EST MOD 30 MIN: CPT | Performed by: STUDENT IN AN ORGANIZED HEALTH CARE EDUCATION/TRAINING PROGRAM

## 2024-10-24 PROCEDURE — 4010F ACE/ARB THERAPY RXD/TAKEN: CPT | Performed by: STUDENT IN AN ORGANIZED HEALTH CARE EDUCATION/TRAINING PROGRAM

## 2024-10-24 RX ORDER — CLOBETASOL PROPIONATE 0.5 MG/G
CREAM TOPICAL
Qty: 60 G | Refills: 3 | Status: SHIPPED | OUTPATIENT
Start: 2024-10-24

## 2024-10-24 RX ORDER — PERMETHRIN 50 MG/G
CREAM TOPICAL
Qty: 60 G | Refills: 0 | Status: SHIPPED | OUTPATIENT
Start: 2024-10-24 | End: 2024-10-29

## 2024-10-24 ASSESSMENT — DERMATOLOGY QUALITY OF LIFE (QOL) ASSESSMENT
DATE THE QUALITY-OF-LIFE ASSESSMENT WAS COMPLETED: 67137
WHAT SINGLE SKIN CONDITION LISTED BELOW IS THE PATIENT ANSWERING THE QUALITY-OF-LIFE ASSESSMENT QUESTIONS ABOUT: NONE OF THE ABOVE
RATE HOW BOTHERED YOU ARE BY SYMPTOMS OF YOUR SKIN PROBLEM (EG, ITCHING, STINGING BURNING, HURTING OR SKIN IRRITATION): 6 - ALWAYS BOTHERED
ARE THERE EXCLUSIONS OR EXCEPTIONS FOR THE QUALITY OF LIFE ASSESSMENT: NO
RATE HOW BOTHERED YOU ARE BY EFFECTS OF YOUR SKIN PROBLEMS ON YOUR ACTIVITIES (EG, GOING OUT, ACCOMPLISHING WHAT YOU WANT, WORK ACTIVITIES OR YOUR RELATIONSHIPS WITH OTHERS): 0 - NEVER BOTHERED
RATE HOW EMOTIONALLY BOTHERED YOU ARE BY YOUR SKIN PROBLEM (FOR EXAMPLE, WORRY, EMBARRASSMENT, FRUSTRATION): 6 - ALWAYS BOTHERED

## 2024-10-24 ASSESSMENT — DERMATOLOGY PATIENT ASSESSMENT
DO YOU HAVE ANY NEW OR CHANGING LESIONS: NO
HAVE YOU HAD OR DO YOU HAVE A STAPH INFECTION: NO
HAVE YOU HAD OR DO YOU HAVE VASCULAR DISEASE: NO
DO YOU USE A TANNING BED: NO
ARE YOU AN ORGAN TRANSPLANT RECIPIENT: NO

## 2024-10-24 ASSESSMENT — PATIENT GLOBAL ASSESSMENT (PGA): PATIENT GLOBAL ASSESSMENT: PATIENT GLOBAL ASSESSMENT:  1 - CLEAR

## 2024-10-24 ASSESSMENT — ITCH NUMERIC RATING SCALE: HOW SEVERE IS YOUR ITCHING?: 10

## 2024-10-24 NOTE — HH CARE COORDINATION
Home Care received a Referral for Nursing. We have processed the referral for a Start of Care on 10/25-10/26.     If you have any questions or concerns, please feel free to contact us at [phone]. Follow the prompts, enter your five digit zip code, and you will be directed to your care team on [facility].

## 2024-10-24 NOTE — PATIENT INSTRUCTIONS
You can continue hydrocortisone cream for genitals and triamcinolone behind the ears once daily, but try my new cream today for chest/back (clobetasol cream)  Dr. Eisenberg considered whether you could have had scabies so he wanted you to use a cream called permethrin , that cream Apply neck down including penis, hands, fingers, chest, back, everywhere- let it sit overnight, and then can wash it off and repeat one week later     See me back in 2 month

## 2024-10-24 NOTE — PROGRESS NOTES
Subjective     Link Macias is a 64 y.o. male who presents for the following: Rash (Chest and arms, itches a lot).     Review of Systems:  No other skin or systemic complaints other than what is documented elsewhere in the note.    The following portions of the chart were reviewed this encounter and updated as appropriate:          Skin Cancer History  No skin cancer on file.      Specialty Problems          Dermatology Problems    Sebaceous cyst    Intertrigo        Objective   Well appearing patient in no apparent distress; mood and affect are within normal limits.    A focused skin examination was performed. All findings within normal limits unless otherwise noted below.    Assessment/Plan   1. Dermatitis  Continued flaring of dermatitis of uncertain etiology/chronic    Left Breast, Left Upper Back, Neck - Anterior, Right Breast, Right Upper Back  On exam, Very pruritic non specific skin colored tiny papules, excoriations, hyperpigmented macules on upper back/chest/arms    Non specific pruritic disorder with features of excoriations and dermal hypersensivity  Not clearly classifiable as prurigo or atopic dermatitis but does include those in the differential also    Related Medications  clobetasol (Temovate) 0.05 % cream  Use on chest and back daily until you see me back in 6 weeks    permethrin (Elimite) 5 % cream  Apply neck down including penis, hands, fingers, chest, back, everywhere- let it sit overnight, and then can wash it off and repeat one week later      Hasn't done permethrin appropriately so will repeat    Also, will escalate topical steroid to clobetasol cream on chest/back daily    Per patient, groin area is improved    You can continue hydrocortisone cream for genitals and triamcinolone behind the ears, but try my new cream today for chest/back (clobetasol cream)

## 2024-10-28 DIAGNOSIS — L30.9 DERMATITIS: ICD-10-CM

## 2024-10-29 ENCOUNTER — HOME CARE VISIT (OUTPATIENT)
Dept: HOME HEALTH SERVICES | Facility: HOME HEALTH | Age: 64
End: 2024-10-29

## 2024-10-29 RX ORDER — PERMETHRIN 50 MG/G
CREAM TOPICAL
Qty: 60 G | Refills: 10 | Status: SHIPPED | OUTPATIENT
Start: 2024-10-29

## 2024-10-31 ENCOUNTER — HOME CARE VISIT (OUTPATIENT)
Dept: HOME HEALTH SERVICES | Facility: HOME HEALTH | Age: 64
End: 2024-10-31

## 2024-11-01 ENCOUNTER — HOSPITAL ENCOUNTER (EMERGENCY)
Facility: HOSPITAL | Age: 64
Discharge: HOME | End: 2024-11-02
Payer: MEDICAID

## 2024-11-01 VITALS
SYSTOLIC BLOOD PRESSURE: 160 MMHG | WEIGHT: 150 LBS | BODY MASS INDEX: 62.74 KG/M2 | DIASTOLIC BLOOD PRESSURE: 59 MMHG | TEMPERATURE: 97.5 F | HEART RATE: 86 BPM | OXYGEN SATURATION: 95 % | RESPIRATION RATE: 18 BRPM

## 2024-11-01 DIAGNOSIS — R21 RASH: ICD-10-CM

## 2024-11-01 DIAGNOSIS — H57.9 EYE PROBLEM: Primary | ICD-10-CM

## 2024-11-01 DIAGNOSIS — L98.9 SKIN LESION: ICD-10-CM

## 2024-11-01 DIAGNOSIS — L30.4 INTERTRIGO: ICD-10-CM

## 2024-11-01 PROCEDURE — 99283 EMERGENCY DEPT VISIT LOW MDM: CPT

## 2024-11-01 PROCEDURE — 99283 EMERGENCY DEPT VISIT LOW MDM: CPT | Performed by: PHYSICIAN ASSISTANT

## 2024-11-01 RX ORDER — DIAPER,BRIEF,INFANT-TODD,DISP
1 EACH MISCELLANEOUS 3 TIMES DAILY
Qty: 113 G | Refills: 0 | Status: SHIPPED | OUTPATIENT
Start: 2024-11-01 | End: 2024-11-15

## 2024-11-06 ENCOUNTER — CLINICAL SUPPORT (OUTPATIENT)
Dept: NUTRITION | Facility: CLINIC | Age: 64
End: 2024-11-06
Payer: MEDICAID

## 2024-11-06 NOTE — PROGRESS NOTES
[facility]  Diet Recommendation 1: Healthy Eating  Food Intolerance Avoidance: NKFA  Household Size: 1 Family Member  Interventions: Referral Number: 1st 6 Mo Referral 6 Mos  Interventions: Visit Number: 2 of 6 Visits - Max 6 Visits/Referral Each 6 Mo Period  Grains: 25-50% Whole  Fruit: 50-75% Fresh  Vegetables: 50-75% Fresh  Proteins: 1-2 Plant-based Items  Dairy: 50-75% Lowfat  Originating Site of Referral Order: [facility]  Initials of RD Assisting Today: ALEJANDRA

## 2024-11-10 ENCOUNTER — CLINICAL SUPPORT (OUTPATIENT)
Dept: EMERGENCY MEDICINE | Facility: HOSPITAL | Age: 64
End: 2024-11-10
Payer: MEDICAID

## 2024-11-10 ENCOUNTER — HOSPITAL ENCOUNTER (EMERGENCY)
Facility: HOSPITAL | Age: 64
Discharge: HOME | End: 2024-11-11
Attending: EMERGENCY MEDICINE
Payer: MEDICAID

## 2024-11-10 VITALS
OXYGEN SATURATION: 98 % | DIASTOLIC BLOOD PRESSURE: 76 MMHG | RESPIRATION RATE: 16 BRPM | HEART RATE: 53 BPM | TEMPERATURE: 97.3 F | SYSTOLIC BLOOD PRESSURE: 145 MMHG

## 2024-11-10 DIAGNOSIS — K64.4 BLEEDING EXTERNAL HEMORRHOIDS: Primary | ICD-10-CM

## 2024-11-10 LAB
ALBUMIN SERPL BCP-MCNC: 4.1 G/DL (ref 3.4–5)
ALP SERPL-CCNC: 92 U/L (ref 33–136)
ALT SERPL W P-5'-P-CCNC: 9 U/L (ref 10–52)
ANION GAP SERPL CALC-SCNC: 15 MMOL/L (ref 10–20)
AST SERPL W P-5'-P-CCNC: 18 U/L (ref 9–39)
BASOPHILS # BLD AUTO: 0.03 X10*3/UL (ref 0–0.1)
BASOPHILS NFR BLD AUTO: 0.5 %
BILIRUB SERPL-MCNC: 0.3 MG/DL (ref 0–1.2)
BUN SERPL-MCNC: 27 MG/DL (ref 6–23)
CALCIUM SERPL-MCNC: 10.5 MG/DL (ref 8.6–10.6)
CHLORIDE SERPL-SCNC: 104 MMOL/L (ref 98–107)
CO2 SERPL-SCNC: 25 MMOL/L (ref 21–32)
CREAT SERPL-MCNC: 0.87 MG/DL (ref 0.5–1.3)
EGFRCR SERPLBLD CKD-EPI 2021: >90 ML/MIN/1.73M*2
EOSINOPHIL # BLD AUTO: 0.06 X10*3/UL (ref 0–0.7)
EOSINOPHIL NFR BLD AUTO: 1 %
ERYTHROCYTE [DISTWIDTH] IN BLOOD BY AUTOMATED COUNT: 15.6 % (ref 11.5–14.5)
GLUCOSE SERPL-MCNC: 105 MG/DL (ref 74–99)
HCT VFR BLD AUTO: 45.9 % (ref 41–52)
HGB BLD-MCNC: 14.9 G/DL (ref 13.5–17.5)
IMM GRANULOCYTES # BLD AUTO: 0.02 X10*3/UL (ref 0–0.7)
IMM GRANULOCYTES NFR BLD AUTO: 0.3 % (ref 0–0.9)
INR PPP: 0.9 (ref 0.9–1.1)
LYMPHOCYTES # BLD AUTO: 1.73 X10*3/UL (ref 1.2–4.8)
LYMPHOCYTES NFR BLD AUTO: 29 %
MCH RBC QN AUTO: 24.6 PG (ref 26–34)
MCHC RBC AUTO-ENTMCNC: 32.5 G/DL (ref 32–36)
MCV RBC AUTO: 76 FL (ref 80–100)
MONOCYTES # BLD AUTO: 0.64 X10*3/UL (ref 0.1–1)
MONOCYTES NFR BLD AUTO: 10.7 %
NEUTROPHILS # BLD AUTO: 3.49 X10*3/UL (ref 1.2–7.7)
NEUTROPHILS NFR BLD AUTO: 58.5 %
NRBC BLD-RTO: 0 /100 WBCS (ref 0–0)
PLATELET # BLD AUTO: 227 X10*3/UL (ref 150–450)
POTASSIUM SERPL-SCNC: 4.5 MMOL/L (ref 3.5–5.3)
PROT SERPL-MCNC: 7.4 G/DL (ref 6.4–8.2)
PROTHROMBIN TIME: 10.4 SECONDS (ref 9.8–12.8)
RBC # BLD AUTO: 6.06 X10*6/UL (ref 4.5–5.9)
SODIUM SERPL-SCNC: 139 MMOL/L (ref 136–145)
WBC # BLD AUTO: 6 X10*3/UL (ref 4.4–11.3)

## 2024-11-10 PROCEDURE — 80053 COMPREHEN METABOLIC PANEL: CPT | Performed by: NURSE PRACTITIONER

## 2024-11-10 PROCEDURE — 85025 COMPLETE CBC W/AUTO DIFF WBC: CPT | Performed by: NURSE PRACTITIONER

## 2024-11-10 PROCEDURE — 36415 COLL VENOUS BLD VENIPUNCTURE: CPT | Performed by: NURSE PRACTITIONER

## 2024-11-10 PROCEDURE — 93005 ELECTROCARDIOGRAM TRACING: CPT

## 2024-11-10 PROCEDURE — 99283 EMERGENCY DEPT VISIT LOW MDM: CPT

## 2024-11-10 PROCEDURE — 99285 EMERGENCY DEPT VISIT HI MDM: CPT | Performed by: EMERGENCY MEDICINE

## 2024-11-10 PROCEDURE — 85610 PROTHROMBIN TIME: CPT | Performed by: NURSE PRACTITIONER

## 2024-11-10 PROCEDURE — 84484 ASSAY OF TROPONIN QUANT: CPT

## 2024-11-10 ASSESSMENT — PAIN - FUNCTIONAL ASSESSMENT: PAIN_FUNCTIONAL_ASSESSMENT: 0-10

## 2024-11-10 ASSESSMENT — PAIN SCALES - GENERAL: PAINLEVEL_OUTOF10: 0 - NO PAIN

## 2024-11-11 LAB
ATRIAL RATE: 55 BPM
CARDIAC TROPONIN I PNL SERPL HS: 6 NG/L (ref 0–53)
P AXIS: 51 DEGREES
P OFFSET: 157 MS
P ONSET: 118 MS
PR INTERVAL: 212 MS
Q ONSET: 224 MS
QRS COUNT: 9 BEATS
QRS DURATION: 78 MS
QT INTERVAL: 392 MS
QTC CALCULATION(BAZETT): 375 MS
QTC FREDERICIA: 380 MS
R AXIS: 39 DEGREES
T AXIS: 194 DEGREES
T OFFSET: 420 MS
VENTRICULAR RATE: 55 BPM

## 2024-11-11 RX ORDER — PRAMOXINE HYDROCHLORIDE 10 MG/G
AEROSOL, FOAM TOPICAL
Qty: 10 G | Refills: 0 | Status: SHIPPED | OUTPATIENT
Start: 2024-11-11

## 2024-11-11 RX ORDER — POLYETHYLENE GLYCOL 3350 17 G/17G
17 POWDER, FOR SOLUTION ORAL 2 TIMES DAILY
Qty: 510 G | Refills: 0 | Status: SHIPPED | OUTPATIENT
Start: 2024-11-11

## 2024-11-11 NOTE — DISCHARGE INSTRUCTIONS
You were seen in the emergency department today for rectal pain and found to have hemorrhoids.  Please use the Proctofoam that was prescribed as indicated and instructions and can use sitz bath's to soothe the area.  Please stay well-hydrated eat a high-fiber diet and use stool softeners as needed to avoid having to strain on the toilet.    Please follow-up with your primary care doctor in the next 2 to 3 days please return to the emergency department if you develop worsening bleeding feel lightheaded short of breath have a headache feel weak fever or for any other concerning symptoms.

## 2024-11-11 NOTE — ED TRIAGE NOTES
Pt endorses bloody stools for a few hours. Pt states it is dark red blood. Pt denies abd pain/n/v/d

## 2024-11-11 NOTE — ED PROVIDER NOTES
History of Present Illness     History provided by: Patient  Limitations to History: None  External Records Reviewed with Brief Summary:  Patient previous ED and hospital records Is not on any blood thinners per chart review, obtain past medical history from this chart review.    HPI:  Link Macias is a 64 y.o. male with history of bilateral AKA, peripheral artery disease hypertension hyperlipidemia and diabetes as well as anxiety who presents for evaluation of bright red blood per rectum for a few hours prior to arrival.  This occurred with a bowel movement that he tried.  He has not had this before.  He denies constipation diarrhea stomach pain cramping back pain.  He is not on blood thinners.  He does endorse some rectal discomfort and says he has been uncomfortable sitting.  He is not having any chest pain shortness of breath numbness tingling or weakness though he does have report of a fleeting episode of chest discomfort earlier today which has now improved.    Physical Exam   Triage vitals:  T 36.3 °C (97.3 °F)  HR 53  /76  RR 16  O2 98 % None (Room air)    General: Awake, alert, in no acute distress  Eyes: Gaze conjugate.  No scleral icterus or injection  HENT: Normo-cephalic, atraumatic. No stridor  CV: Regular rate, regular rhythm. Radial pulses 2+ bilaterally  Resp: Breathing non-labored, speaking in full sentences.  Clear to auscultation bilaterally  GI: Soft, non-distended, non-tender. No rebound or guarding.  : Perineum with brown stool at rectum no obvious bright red blood.  No external fissures, +external hemorrhoid at 7 o'clock  MSK/Extremities: Bilateral AKA   skin: Warm. Appropriate color  Neuro: Alert. Oriented. Face symmetric. Speech is fluent.  Gross strength and sensation intact in b/l UE   Psych: Appropriate mood and affect      Medical Decision Making & ED Course   Medical Decision Makin y.o. male Patient was seen for evaluation of rectal pain and bleeding.  His history  and exam is most consistent with hemorrhoid.  Patient's exam did not show any signs of a thrombosed hemorrhoid or other emergent concerns for rectal pain such as but not limited to anal rectal abscess rectal foreign body anal fissure anal fistula proctitis or rectal prolapse.  Patient was prescribed Proctofoam bowel regimen with MiraLAX and sitz bath to use as needed and after bowel movements.      Patient did obtain an EKG and a troponin given earlier chest pain.  EKG with nonspecific changes may be secondary to lead placement is very subtle T wave changes in the lateral leads and no elevation in troponin.  Patient was discharged home with strict return precautions for return of chest pain worsening bleeding fevers syncope and will follow-up with his primary care doctor in the next 2 to 3 days.   ----  Social Determinants of Health which Significantly Impact Care: Transportation difficulties The following actions were taken to address these social determinants: Transportation arranged for patient to home    EKG Independent Interpretation: EKG interpreted by myself. Please see ED Course and MDM for full interpretation.    Independent Result Review and Interpretation: Results were independently reviewed and interpreted by myself. Please see ED course and [facility] for full interpretation.    Chronic conditions affecting the patient's care: As documented in the MDM    The patient was discussed with the following consultants/services: None    Care Considerations: As per [facility]    ED Course:  ED Course as of 11/11/24 0134   Sun Nov 10, 2024   6547 ATTENDING ATTESTATION  Patient presenting to the emergency department with some dark blood blotting tissue paper found to have a nonthrombosed external hemorrhoid.  No concern for kade rectal bleeding, the patient does appear slightly dehydrated with an elevated BUN he is able to eat and drink no problem has no abdominal pain or any other complaints.  The patient has no sign of  hemorrhagic shock he is feeling better anticipate a safe disposition home to follow-up outpatient with surgery as needed and return with any signs of thrombosis. EKG with stable chronic far lat T inv. No active CP. HD stable  [facility] [RH]   Mon Nov 11, 2024   0129 ECG 12 lead  EKG independently interpreted with heart rate of 55 with sinus bradycardia with first-degree AV block MO interval 212 MS QRS 78 MS QTc 375 MS within normal limits no acute ST segment elevations or T wave inversions concerning for acute ischemia.  On comparison to previous EKGs there are some T wave abnormalities in the lateral leads but not significantly different than previous EKGs.  Patient had had a fleeting moment of chest discomfort earlier that is now resolved not having current chest pain with a negative troponin overall low concern for acute coronary symptom unstable angina myocarditis endocarditis pericarditis [SC]   0131 CBC and Auto Differential(!)  No acute anemia thrombocytopenia leukocytosis   [SC]   0131 Comprehensive metabolic panel(!)  No acute renal hepatic or electrolyte abnormalities   [SC]   0132 Protime-INR  Coag within normal limits [SC]      ED Course User Index  [RH] Chau Zee DO  [SC] Naye Joseph DO         Diagnoses as of 11/11/24 0134   Bleeding external hemorrhoids     Disposition   As a result of the work-up, the patient was discharged home.  he was informed of his diagnosis and instructed to come back with any concerns or worsening of condition.  he and was agreeable to the plan as discussed above.  he was given the opportunity to ask questions.  All of the patient's questions were answered.    Procedures   Procedures    Patient seen and discussed with ED attending physician.    Naye Joseph DO  Emergency Medicine     Naye Joseph DO  Resident  11/11/24 9409

## 2024-11-15 ENCOUNTER — HOSPITAL ENCOUNTER (EMERGENCY)
Facility: HOSPITAL | Age: 64
Discharge: HOME | End: 2024-11-16
Attending: STUDENT IN AN ORGANIZED HEALTH CARE EDUCATION/TRAINING PROGRAM
Payer: MEDICAID

## 2024-11-15 VITALS
RESPIRATION RATE: 16 BRPM | TEMPERATURE: 98.6 F | OXYGEN SATURATION: 98 % | SYSTOLIC BLOOD PRESSURE: 117 MMHG | BODY MASS INDEX: 62.74 KG/M2 | HEIGHT: 60 IN | HEART RATE: 67 BPM | DIASTOLIC BLOOD PRESSURE: 58 MMHG

## 2024-11-15 DIAGNOSIS — M79.605 PAIN OF LEFT LOWER EXTREMITY: ICD-10-CM

## 2024-11-15 DIAGNOSIS — H10.33 ACUTE BACTERIAL CONJUNCTIVITIS OF BOTH EYES: Primary | ICD-10-CM

## 2024-11-15 PROCEDURE — 80053 COMPREHEN METABOLIC PANEL: CPT

## 2024-11-15 PROCEDURE — 36415 COLL VENOUS BLD VENIPUNCTURE: CPT

## 2024-11-15 PROCEDURE — 85025 COMPLETE CBC W/AUTO DIFF WBC: CPT

## 2024-11-15 PROCEDURE — 83735 ASSAY OF MAGNESIUM: CPT

## 2024-11-15 PROCEDURE — 99284 EMERGENCY DEPT VISIT MOD MDM: CPT | Mod: 25

## 2024-11-15 PROCEDURE — 99284 EMERGENCY DEPT VISIT MOD MDM: CPT | Performed by: STUDENT IN AN ORGANIZED HEALTH CARE EDUCATION/TRAINING PROGRAM

## 2024-11-15 RX ORDER — TETRACAINE HYDROCHLORIDE 5 MG/ML
1 SOLUTION OPHTHALMIC ONCE
Status: COMPLETED | OUTPATIENT
Start: 2024-11-15 | End: 2024-11-16

## 2024-11-15 RX ORDER — LIDOCAINE 560 MG/1
1 PATCH PERCUTANEOUS; TOPICAL; TRANSDERMAL ONCE
Status: DISCONTINUED | OUTPATIENT
Start: 2024-11-15 | End: 2024-11-16 | Stop reason: HOSPADM

## 2024-11-15 RX ORDER — KETOROLAC TROMETHAMINE 15 MG/ML
15 INJECTION, SOLUTION INTRAMUSCULAR; INTRAVENOUS ONCE
Status: COMPLETED | OUTPATIENT
Start: 2024-11-15 | End: 2024-11-16

## 2024-11-15 RX ORDER — ACETAMINOPHEN 325 MG/1
975 TABLET ORAL ONCE
Status: COMPLETED | OUTPATIENT
Start: 2024-11-15 | End: 2024-11-16

## 2024-11-16 ENCOUNTER — APPOINTMENT (OUTPATIENT)
Dept: RADIOLOGY | Facility: HOSPITAL | Age: 64
End: 2024-11-16
Payer: MEDICAID

## 2024-11-16 ENCOUNTER — CLINICAL SUPPORT (OUTPATIENT)
Dept: EMERGENCY MEDICINE | Facility: HOSPITAL | Age: 64
End: 2024-11-16
Payer: MEDICAID

## 2024-11-16 ENCOUNTER — HOSPITAL ENCOUNTER (EMERGENCY)
Facility: HOSPITAL | Age: 64
Discharge: HOME | End: 2024-11-17
Attending: EMERGENCY MEDICINE
Payer: MEDICAID

## 2024-11-16 VITALS
HEIGHT: 60 IN | OXYGEN SATURATION: 96 % | HEART RATE: 70 BPM | TEMPERATURE: 98.8 F | SYSTOLIC BLOOD PRESSURE: 128 MMHG | RESPIRATION RATE: 16 BRPM | DIASTOLIC BLOOD PRESSURE: 70 MMHG | BODY MASS INDEX: 29.45 KG/M2 | WEIGHT: 150 LBS

## 2024-11-16 DIAGNOSIS — R07.9 CHEST PAIN, UNSPECIFIED TYPE: Primary | ICD-10-CM

## 2024-11-16 LAB
ALBUMIN SERPL BCP-MCNC: 4.4 G/DL (ref 3.4–5)
ALBUMIN SERPL BCP-MCNC: 4.5 G/DL (ref 3.4–5)
ALP SERPL-CCNC: 88 U/L (ref 33–136)
ALP SERPL-CCNC: 90 U/L (ref 33–136)
ALT SERPL W P-5'-P-CCNC: 8 U/L (ref 10–52)
ALT SERPL W P-5'-P-CCNC: 9 U/L (ref 10–52)
ANION GAP SERPL CALC-SCNC: 15 MMOL/L (ref 10–20)
ANION GAP SERPL CALC-SCNC: 17 MMOL/L (ref 10–20)
AST SERPL W P-5'-P-CCNC: 17 U/L (ref 9–39)
AST SERPL W P-5'-P-CCNC: 25 U/L (ref 9–39)
ATRIAL RATE: 72 BPM
BASOPHILS # BLD AUTO: 0.03 X10*3/UL (ref 0–0.1)
BASOPHILS # BLD AUTO: 0.03 X10*3/UL (ref 0–0.1)
BASOPHILS NFR BLD AUTO: 0.5 %
BASOPHILS NFR BLD AUTO: 1 %
BILIRUB SERPL-MCNC: 0.2 MG/DL (ref 0–1.2)
BILIRUB SERPL-MCNC: 0.3 MG/DL (ref 0–1.2)
BUN SERPL-MCNC: 19 MG/DL (ref 6–23)
BUN SERPL-MCNC: 19 MG/DL (ref 6–23)
CALCIUM SERPL-MCNC: 9.1 MG/DL (ref 8.6–10.6)
CALCIUM SERPL-MCNC: 9.1 MG/DL (ref 8.6–10.6)
CARDIAC TROPONIN I PNL SERPL HS: 6 NG/L (ref 0–53)
CARDIAC TROPONIN I PNL SERPL HS: 7 NG/L (ref 0–53)
CHLORIDE SERPL-SCNC: 108 MMOL/L (ref 98–107)
CHLORIDE SERPL-SCNC: 109 MMOL/L (ref 98–107)
CO2 SERPL-SCNC: 20 MMOL/L (ref 21–32)
CO2 SERPL-SCNC: 21 MMOL/L (ref 21–32)
CREAT SERPL-MCNC: 0.69 MG/DL (ref 0.5–1.3)
CREAT SERPL-MCNC: 0.73 MG/DL (ref 0.5–1.3)
EGFRCR SERPLBLD CKD-EPI 2021: >90 ML/MIN/1.73M*2
EGFRCR SERPLBLD CKD-EPI 2021: >90 ML/MIN/1.73M*2
EOSINOPHIL # BLD AUTO: 0.04 X10*3/UL (ref 0–0.7)
EOSINOPHIL # BLD AUTO: 0.07 X10*3/UL (ref 0–0.7)
EOSINOPHIL NFR BLD AUTO: 0.7 %
EOSINOPHIL NFR BLD AUTO: 2.3 %
ERYTHROCYTE [DISTWIDTH] IN BLOOD BY AUTOMATED COUNT: 15.6 % (ref 11.5–14.5)
ERYTHROCYTE [DISTWIDTH] IN BLOOD BY AUTOMATED COUNT: 15.6 % (ref 11.5–14.5)
GLUCOSE SERPL-MCNC: 139 MG/DL (ref 74–99)
GLUCOSE SERPL-MCNC: 89 MG/DL (ref 74–99)
HCT VFR BLD AUTO: 44.3 % (ref 41–52)
HCT VFR BLD AUTO: 45.4 % (ref 41–52)
HGB BLD-MCNC: 14.3 G/DL (ref 13.5–17.5)
HGB BLD-MCNC: 14.3 G/DL (ref 13.5–17.5)
IMM GRANULOCYTES # BLD AUTO: 0.01 X10*3/UL (ref 0–0.7)
IMM GRANULOCYTES # BLD AUTO: 0.01 X10*3/UL (ref 0–0.7)
IMM GRANULOCYTES NFR BLD AUTO: 0.2 % (ref 0–0.9)
IMM GRANULOCYTES NFR BLD AUTO: 0.3 % (ref 0–0.9)
LYMPHOCYTES # BLD AUTO: 1.34 X10*3/UL (ref 1.2–4.8)
LYMPHOCYTES # BLD AUTO: 1.52 X10*3/UL (ref 1.2–4.8)
LYMPHOCYTES NFR BLD AUTO: 26.6 %
LYMPHOCYTES NFR BLD AUTO: 44.5 %
MAGNESIUM SERPL-MCNC: 1.95 MG/DL (ref 1.6–2.4)
MAGNESIUM SERPL-MCNC: 2.04 MG/DL (ref 1.6–2.4)
MCH RBC QN AUTO: 23.8 PG (ref 26–34)
MCH RBC QN AUTO: 23.9 PG (ref 26–34)
MCHC RBC AUTO-ENTMCNC: 31.5 G/DL (ref 32–36)
MCHC RBC AUTO-ENTMCNC: 32.3 G/DL (ref 32–36)
MCV RBC AUTO: 74 FL (ref 80–100)
MCV RBC AUTO: 76 FL (ref 80–100)
MONOCYTES # BLD AUTO: 0.42 X10*3/UL (ref 0.1–1)
MONOCYTES # BLD AUTO: 0.59 X10*3/UL (ref 0.1–1)
MONOCYTES NFR BLD AUTO: 10.3 %
MONOCYTES NFR BLD AUTO: 14 %
NEUTROPHILS # BLD AUTO: 1.14 X10*3/UL (ref 1.2–7.7)
NEUTROPHILS # BLD AUTO: 3.52 X10*3/UL (ref 1.2–7.7)
NEUTROPHILS NFR BLD AUTO: 37.9 %
NEUTROPHILS NFR BLD AUTO: 61.7 %
NRBC BLD-RTO: 0 /100 WBCS (ref 0–0)
NRBC BLD-RTO: 0 /100 WBCS (ref 0–0)
P AXIS: 56 DEGREES
P OFFSET: 149 MS
P ONSET: 105 MS
PHOSPHATE SERPL-MCNC: 2.7 MG/DL (ref 2.5–4.9)
PLATELET # BLD AUTO: 226 X10*3/UL (ref 150–450)
PLATELET # BLD AUTO: 248 X10*3/UL (ref 150–450)
POTASSIUM SERPL-SCNC: 3.9 MMOL/L (ref 3.5–5.3)
POTASSIUM SERPL-SCNC: 4.9 MMOL/L (ref 3.5–5.3)
PR INTERVAL: 242 MS
PROT SERPL-MCNC: 7.4 G/DL (ref 6.4–8.2)
PROT SERPL-MCNC: 7.5 G/DL (ref 6.4–8.2)
Q ONSET: 226 MS
QRS COUNT: 12 BEATS
QRS DURATION: 74 MS
QT INTERVAL: 360 MS
QTC CALCULATION(BAZETT): 394 MS
QTC FREDERICIA: 382 MS
R AXIS: 24 DEGREES
RBC # BLD AUTO: 5.98 X10*6/UL (ref 4.5–5.9)
RBC # BLD AUTO: 6 X10*6/UL (ref 4.5–5.9)
SODIUM SERPL-SCNC: 140 MMOL/L (ref 136–145)
SODIUM SERPL-SCNC: 141 MMOL/L (ref 136–145)
T AXIS: 240 DEGREES
T OFFSET: 406 MS
VENTRICULAR RATE: 72 BPM
WBC # BLD AUTO: 3 X10*3/UL (ref 4.4–11.3)
WBC # BLD AUTO: 5.7 X10*3/UL (ref 4.4–11.3)

## 2024-11-16 PROCEDURE — 84100 ASSAY OF PHOSPHORUS: CPT

## 2024-11-16 PROCEDURE — 36415 COLL VENOUS BLD VENIPUNCTURE: CPT | Performed by: EMERGENCY MEDICINE

## 2024-11-16 PROCEDURE — 72170 X-RAY EXAM OF PELVIS: CPT

## 2024-11-16 PROCEDURE — 2500000004 HC RX 250 GENERAL PHARMACY W/ HCPCS (ALT 636 FOR OP/ED): Mod: SE

## 2024-11-16 PROCEDURE — 96374 THER/PROPH/DIAG INJ IV PUSH: CPT

## 2024-11-16 PROCEDURE — 71046 X-RAY EXAM CHEST 2 VIEWS: CPT | Performed by: STUDENT IN AN ORGANIZED HEALTH CARE EDUCATION/TRAINING PROGRAM

## 2024-11-16 PROCEDURE — 83735 ASSAY OF MAGNESIUM: CPT

## 2024-11-16 PROCEDURE — 84484 ASSAY OF TROPONIN QUANT: CPT | Performed by: EMERGENCY MEDICINE

## 2024-11-16 PROCEDURE — 84075 ASSAY ALKALINE PHOSPHATASE: CPT | Performed by: EMERGENCY MEDICINE

## 2024-11-16 PROCEDURE — 2500000005 HC RX 250 GENERAL PHARMACY W/O HCPCS: Mod: SE

## 2024-11-16 PROCEDURE — 85025 COMPLETE CBC W/AUTO DIFF WBC: CPT | Performed by: EMERGENCY MEDICINE

## 2024-11-16 PROCEDURE — 99284 EMERGENCY DEPT VISIT MOD MDM: CPT | Mod: 25

## 2024-11-16 PROCEDURE — 2500000001 HC RX 250 WO HCPCS SELF ADMINISTERED DRUGS (ALT 637 FOR MEDICARE OP): Mod: SE

## 2024-11-16 PROCEDURE — 72170 X-RAY EXAM OF PELVIS: CPT | Performed by: STUDENT IN AN ORGANIZED HEALTH CARE EDUCATION/TRAINING PROGRAM

## 2024-11-16 PROCEDURE — 93005 ELECTROCARDIOGRAM TRACING: CPT

## 2024-11-16 PROCEDURE — 71046 X-RAY EXAM CHEST 2 VIEWS: CPT

## 2024-11-16 RX ORDER — POLYMYXIN B SULFATE AND TRIMETHOPRIM 1; 10000 MG/ML; [USP'U]/ML
1 SOLUTION OPHTHALMIC EVERY 4 HOURS
Qty: 1 ML | Refills: 0 | Status: SHIPPED | OUTPATIENT
Start: 2024-11-16 | End: 2024-11-26

## 2024-11-16 RX ORDER — POLYMYXIN B SULFATE AND TRIMETHOPRIM 1; 10000 MG/ML; [USP'U]/ML
2 SOLUTION OPHTHALMIC ONCE
Status: COMPLETED | OUTPATIENT
Start: 2024-11-16 | End: 2024-11-16

## 2024-11-16 RX ORDER — OXYCODONE HYDROCHLORIDE 5 MG/1
5 TABLET ORAL ONCE
Status: COMPLETED | OUTPATIENT
Start: 2024-11-16 | End: 2024-11-16

## 2024-11-16 RX ORDER — ACETAMINOPHEN 325 MG/1
975 TABLET ORAL ONCE
Status: COMPLETED | OUTPATIENT
Start: 2024-11-16 | End: 2024-11-16

## 2024-11-16 RX ORDER — ACETAMINOPHEN 325 MG/1
650 TABLET ORAL EVERY 6 HOURS PRN
Qty: 30 TABLET | Refills: 0 | Status: SHIPPED | OUTPATIENT
Start: 2024-11-16 | End: 2024-11-26

## 2024-11-16 RX ORDER — KETOROLAC TROMETHAMINE 15 MG/ML
15 INJECTION, SOLUTION INTRAMUSCULAR; INTRAVENOUS ONCE
Status: COMPLETED | OUTPATIENT
Start: 2024-11-16 | End: 2024-11-16

## 2024-11-16 RX ADMIN — LIDOCAINE 1 PATCH: 4 PATCH TOPICAL at 00:08

## 2024-11-16 RX ADMIN — POLYMYXIN B SULFATE AND TRIMETHOPRIM 2 DROP: 10000; 1 SOLUTION OPHTHALMIC at 03:34

## 2024-11-16 RX ADMIN — KETOROLAC TROMETHAMINE 15 MG: 15 INJECTION, SOLUTION INTRAMUSCULAR; INTRAVENOUS at 00:06

## 2024-11-16 RX ADMIN — TETRACAINE HYDROCHLORIDE 1 DROP: 5 SOLUTION OPHTHALMIC at 00:07

## 2024-11-16 RX ADMIN — FLUORESCEIN SODIUM 1 STRIP: 1 STRIP OPHTHALMIC at 00:07

## 2024-11-16 RX ADMIN — CARBOXYMETHYLCELLULOSE SODIUM 1 DROP: 5 SOLUTION/ DROPS OPHTHALMIC at 00:08

## 2024-11-16 RX ADMIN — ACETAMINOPHEN 975 MG: 325 TABLET ORAL at 00:08

## 2024-11-16 ASSESSMENT — PAIN - FUNCTIONAL ASSESSMENT
PAIN_FUNCTIONAL_ASSESSMENT: 0-10

## 2024-11-16 ASSESSMENT — PAIN SCALES - GENERAL
PAINLEVEL_OUTOF10: 10 - WORST POSSIBLE PAIN

## 2024-11-16 ASSESSMENT — PAIN DESCRIPTION - LOCATION
LOCATION: CHEST
LOCATION_2: HIP

## 2024-11-16 ASSESSMENT — PAIN DESCRIPTION - DESCRIPTORS
DESCRIPTORS_2: SHARP
DESCRIPTORS: SHARP

## 2024-11-16 ASSESSMENT — PAIN DESCRIPTION - PROGRESSION: CLINICAL_PROGRESSION: NOT CHANGED

## 2024-11-16 ASSESSMENT — PAIN DESCRIPTION - ORIENTATION: ORIENTATION_2: RIGHT

## 2024-11-16 NOTE — PROGRESS NOTES
Emergency Department Transition of Care Note       Signout   I received Link Macias in signout from Dr. Gallegos.  Please see the ED Provider Note for all HPI, PE and MDM up to the time of signout at 6PM.  This is in addition to the primary record.    In brief Link Macias is an 64 y.o. male with PMHx bilateral AKA, peripheral artery disease, HTN, HLD, DM, and anxiety presents with new onset chest pain. Patient also complained of right hip pain.    At the time of signout we were awaiting:  Delta troponin, Pelvis XR    ED Course & Medical Decision Making   Medical Decision Making:  Under my care, pelvic XR without evidence of acute fracture.  Repeat troponin was 7 from 6. Patient was ultimately discharged.    ED Course:  ED Course as of 11/16/24 2043   Sat Nov 16, 2024   1640 ECG 12 lead  Sinus rhythm with 1st degree AV block, MA prolonged 240, intervals otherwise normal, no ST elevation/depression, t wave abnormality in V5, V6 (stable from prior on 11/10/24) [SK]   1741 XR chest 2 views  CXR without acute cardiopulmonary changes.  [SK]   1900 XR pelvis 1-2 views  Status post bilateral hip disarticulation with no definite acute osseous abnormality within the limitations of diffuse bone demineralization. [SL]      ED Course User Index  [SK] Matt Gallegos, DO  [SL] Hector Peres MD         Diagnoses as of 11/16/24 2043   Chest pain, unspecified type       Disposition   As a result of the work-up, the patient was discharged home.  he was informed of his diagnosis and instructed to come back with any concerns or worsening of condition.  he and was agreeable to the plan as discussed above.  he was given the opportunity to ask questions.  All of the patient's questions were answered.    Procedures   Procedures    Patient seen and discussed with ED attending physician.    Hector Peres MD  Emergency Medicine

## 2024-11-16 NOTE — ED TRIAGE NOTES
Patient brought in by [facility]. Patient complaining of 10/10 sharp CP that started approx 30 minutes PTA reports no activity on onset and no radiation. Patient also complaining of right hip pain (known Bilat AKA). Patient A&Ox4, resting calmly in bed on arrival.

## 2024-11-16 NOTE — ED TRIAGE NOTES
BIBA from pain on left leg at amputation site. No wound/ ulcer at site. Bilateral aka. Hx diabetes Aox4

## 2024-11-16 NOTE — ED PROVIDER NOTES
History of Present Illness     History provided by: Patient  Limitations to History: None    HPI:  Link Macias is a 64 y.o. male history of bilateral AKA, peripheral artery disease hypertension hyperlipidemia and diabetes as well as anxiety who presents for bilateral eye pain and pain at left outer AKA site.  Patient states he has had itchy watery eyes on and off for the past month.  He denies any pain with eye movement, no headaches.  Endorses blurry vision, states he usually wears his glasses, does not wear contacts.  He has been using Polytrim drops in his right eye, but not his left.  Patient states that the prescription says to use in the right eye.  No fevers chills.  Tolerating p.o. intake.  In regards to his leg pain, patient states this started 3 days ago on the left outer side of his AKA.  Denies any trauma or falls.  No itching or rash noted.  Denies any chest pain, shortness of breath, abdominal pain.  No urinary symptoms.  Patient states that he has a  who he has been working closely with, he is set up to go to rehab for therapy and is hopeful that he can improve his quality of life.  Patient additionally states that he lost his glasses and is hoping he can see an ophthalmologist to get a new prescription and help obtaining glasses.    Physical Exam   Triage vitals:  T 37 °C (98.6 °F)  HR 67  /58  RR 16  O2 98 %      General: Awake, alert, in no acute distress  Eyes: Bilateral conjunctival injection.  Purulent discharge noted in medial canthus of left eye.  Pupils equal round reactive to light.  Extraocular movements intact.  No pain with eye movements.  Bilateral 20/30 vision without glasses.  No corneal abrasion noted on fluorescein stain   HENT: Normo-cephalic, atraumatic. No stridor  CV: Regular rate, regular rhythm. Radial pulses 2+ bilaterally  Resp: Breathing non-labored, speaking in full sentences.  Clear to auscultation bilaterally  GI: Soft, non-distended, non-tender.  No rebound or guarding.  MSK/Extremities: Bilateral AKA.  No rash or skin changes noted.  No erythema orcellulitis.  Skin: Warm. Appropriate color  Neuro: Alert. Oriented. Face symmetric. Speech is fluent.  Gross strength and sensation intact in b/l UE and LEs  Psych: Appropriate mood and affect    Medical Decision Making & ED Course   Medical Decision Makin y.o. male presenting with bilateral eye pain and left leg pain at site of AKA.  Triage vital signs reviewed and patient is hemodynamically stable at this time.  Afebrile. Patient has been using Polytrim drops in his right eye, concern for superimposed bacterial infection given chronicity.  Discharge is overall watery from right eye, however purulent discharge noted in the medial canthus of left eye.  Concern for corneal abrasion, obtained fluorescein stain that did not show signs of abnormal uptake.  Patient denies foreign body sensation.  No rashes noted.  No concern for preseptal cellulitis or orbital cellulitis.  Patient given artificial tears and Polytrim in the department.  Discussed with patient erythromycin ointment, patient prefers to not use ointment.  Patient discharged with a prescription for Polytrim and ophthalmology referral, patient additionally provided with phone number for ophthalmology.  In regards to leg pain, differential includes musculoskeletal pain, phantom leg pain, low concern for acute injury given no history of falls or trauma.  Low concern for infection, no erythema or or skin changes.  No palpable abscess.  Patient given Toradol and Tylenol with lidocaine patch. Labs resulted showing no leukocytosis or electrolyte abnormalities.  Patient discharged home in stable condition with prescriptions and close follow-up.    ED Course:  ED Course as of 24 0106   Sat 2024   0017 Attending summary:  63 y/o M HTN, HLD, DM, PAD s/p bilateral proximal AKA who is presenting for L leg pain. Reports going on 2-3 days. No trauma.  No rashes or skin changes. No fever, chills, n/v. Tolerating PO. He reports 2 weeks of eye redness and drainage too. Started in R eye which improved but now bilateral. No trauma. +clear tearing from eye. No HA, vision loss. Wears glasses that he doesn't have with him, no contacts. Has been using polytrim drops.    Vitals unremarkable. Exam shows well appearing M who bilateral conjunctival injection with PERRL and EOMI without pain, thin purulent scant drainage in L medial canthus, no periorbital erythema or tenderness. L AKA stump with sensation intact, no erythema/tenderness/induration.    On chart review, has a hx of phantom limb pain. He has no signs of cellulitis/abscess, sensation intact and skin warm so not concerned for arterial ischemia. No hx of trauma or deformity. Eye symptoms likely due to viral or allergic conjunctivitis. L eye may have component of bacterial superinfection and will tx as such. Will fluorescein for corneal abrasion. Low suspicion for acute angle closure glaucoma with chronicity and no pain, no signs/symptoms of preseptal or orbital cellulitis. Will treat pain, likely dc home with ophtho referral.  [SS]      ED Course User Index  [SS] Sasha Arroyo MD         Diagnoses as of 11/16/24 0106   Acute bacterial conjunctivitis of both eyes         Independent Result Review and Interpretation: Relevant laboratory and radiographic results were reviewed and independently interpreted by myself.  As necessary, they are commented on in the ED Course.    Care Considerations: As documented above in MDM      Disposition   As a result of the work-up, the patient was discharged home.  he was informed of his diagnosis and instructed to come back with any concerns or worsening of condition.  he and was agreeable to the plan as discussed above.  he was given the opportunity to ask questions.  All of the patient's questions were answered.    Procedures   Procedures    Patient seen and discussed with ED  attending physician.    Kia Harris DO  Emergency Medicine     Kia Harris DO  Resident  11/16/24 0153

## 2024-11-16 NOTE — ED PROVIDER NOTES
History of Present Illness     History provided by: Patient  Limitations to History: None  External Records Reviewed with Brief Summary:  ED visit from 11/15 for eye pain discharge with eye drops     HPI:  Link Macias is a 64 y.o. male with PMHx bilateral AKA, peripheral artery disease, HTN, HLD, DM, and anxiety presents with new onset chest pain. Patient states that the chest pain started approximately 30 mins ago, is sharp, mid sternal, non-radiating. Patient denies headache, nausea/vomiting, SOB, abdominal pain. Patient denies fevers, chills, trauma, falls, rash. Patient also endorses right pelvic pain that started at the same time as the chest pain and is non radiating in nature.     Physical Exam   Triage vitals:  T 37.1 °C (98.8 °F)  HR 77  /68  RR 16  O2 97 % None (Room air)    Physical Exam  Constitutional:       General: He is not in acute distress.     Appearance: He is not ill-appearing.   HENT:      Head: Normocephalic and atraumatic.   Cardiovascular:      Rate and Rhythm: Normal rate and regular rhythm.      Pulses:           Radial pulses are 2+ on the right side and 2+ on the left side.      Heart sounds: Normal heart sounds. Heart sounds not distant. No murmur heard.  Pulmonary:      Effort: Pulmonary effort is normal. No tachypnea or respiratory distress.      Breath sounds: Normal breath sounds. No decreased breath sounds, wheezing, rhonchi or rales.   Chest:      Chest wall: No tenderness.   Abdominal:      General: Bowel sounds are normal.      Palpations: Abdomen is soft.      Tenderness: There is no abdominal tenderness.   Musculoskeletal:      Comments: Bilateral AKA, mild tenderness to palpation of right pelvic region   Neurological:      Mental Status: He is alert.        Medical Decision Making & ED Course   Medical Decision Makin y.o. male with PMHx bilateral AKA, peripheral artery disease, HTN, HLD, DM, and anxiety presents with new onset chest pain.   Ordered CBC,  CMP/Mg/Phos, trop, CXR, EKG  Wells PE low risk and low suspicion given no SOB, normal vitals, will not further workup at this time.   Patient care transitioned with patient in stable condition and treatment in progress at signout at 18:00 to Dr. Peres. Pending delta trop, if flat or downtrend, patient likely appropriate for discharge with outpatient follow up given recent stress test on 8/16/24 negative for ischemic changes.   ----      Differential diagnoses considered include but are not limited to: ACS, GERD, MSK chest pain, anxiety      Social Determinants of Health which Significantly Impact Care: None identified     EKG Independent Interpretation: EKG interpreted by myself. Please see ED Course for full interpretation.    Independent Result Review and Interpretation: Relevant laboratory and radiographic results were reviewed and independently interpreted by myself.  As necessary, they are commented on in the ED Course.    Chronic conditions affecting the patient's care: As documented above in [facility]    The patient was discussed with the following consultants/services: None    Care Considerations: As documented above in [facility]    ED Course:  ED Course as of 11/16/24 1744   Sat Nov 16, 2024   1640 ECG 12 lead  Sinus rhythm with 1st degree AV block, W[place] prolonged 240, intervals otherwise normal, no ST elevation/depression, t wave abnormality in V5, V6 (stable from prior on 11/10/24) [SK]   1741 XR chest 2 views  CXR without acute cardiopulmonary changes.  [SK]      ED Course User Index  [SK] Matt Gallegos, DO     Disposition   Patient care transitioned with patient in stable condition and treatment in progress at signout at 18:00 to Dr. Peres. Pending delta trop, if flat or downtrend, patient likely appropriate for discharge with outpatient follow up given recent stress test on 8/16/24 negative for ischemic changes.     Procedures   Procedures    This was a shared visit with an ED attending.  The patient was seen and  discussed with the ED attending    Matt Gallegos DO  Emergency Medicine       Matt Gallegos DO  Resident  11/16/24 0451

## 2024-11-19 ENCOUNTER — EVALUATION (OUTPATIENT)
Dept: PHYSICAL THERAPY | Facility: CLINIC | Age: 64
End: 2024-11-19
Payer: MEDICAID

## 2024-11-19 DIAGNOSIS — G54.6 PHANTOM LIMB PAIN (MULTI): ICD-10-CM

## 2024-11-19 PROCEDURE — 97161 PT EVAL LOW COMPLEX 20 MIN: CPT | Mod: GP | Performed by: PHYSICAL THERAPIST

## 2024-11-19 PROCEDURE — 97110 THERAPEUTIC EXERCISES: CPT | Mod: GP | Performed by: PHYSICAL THERAPIST

## 2024-11-19 ASSESSMENT — ENCOUNTER SYMPTOMS
OCCASIONAL FEELINGS OF UNSTEADINESS: 0
DEPRESSION: 0
LOSS OF SENSATION IN FEET: 0

## 2024-11-19 NOTE — PROGRESS NOTES
Physical Therapy    Physical Therapy Evaluation and Treatment      Patient Name: Link Macias  MRN: [id number]  Today's Date: 11/19/2024  Visit: 1  Insurance: Reviewed  Physician: Chapin Cisse  Problem List Items Addressed This Visit    None  Visit Diagnoses         Codes    Phantom limb pain (Multi)     G54.6    Relevant Orders    Follow Up In Physical Therapy             Time Entry:   Time Calculation  Start Time: 0855  Stop Time: 0930  Time Calculation (min): 35 min  PT Evaluation Time Entry  PT Evaluation (Low) Time Entry: 25  PT Therapeutic Procedures Time Entry  Therapeutic Exercise Time Entry: 10                   Assessment: Patient seen in PT for Initial Evaluation for left shoulder pain.   Patient presents with postural deviation, decreased shoulder ROM , decreased shoulder strength, shoulder tenderness, positive impingement.  Functionally, patient  unable to reach into high areas, or do heavy lifting with left arm.  Patient rates quickdash at 50%.  Patient is wheelchair bound due to bilateral leg amputations.           Plan:  Continue with POC  Pulleys, shoulder ROM/strength, PRE's,. Shoulder stretches, CKC, stability         Current Problem:   1. Phantom limb pain (Multi)  Referral to Physical Therapy    Follow Up In Physical Therapy          Subjective    General: Patient with a history of left arm pain for months.  Onset was insidious.  Patient just got prescription for pain - tyelonol.  Patient complains of pain in the region of the left ant shoulder and lateral to elbow, sharp pain, 10/10 at worst last 7 days.  Patient's pain is worse with laying down - sleeps on sides - worse with laying on left side, pain with propelling wc - trying to electric WC. .  HHA M-F - transfer independently - helps him to clean   Functionally, patient is unable to reach up with left arm, lift with left arm.       Precautions: fall risk, DM, HTN  Precautions  STEADI Fall Risk Score (The score of 4 or more indicates an  increased risk of falling): 0     Prior Level of Function: no limits       Objective     Postural deviation: rounded shoulders, FH, thoracic kyphosis,tends to lean to left in WC  left Shoulder ROM to 140 flexion, 135 abduction, 60 er, and HBB to T9  left Shoulder strength 4/5  flex, 4/5 abduction, 4/5  ER, and 4+/5  IR  Special tests: positive impingement  Patient tender to palpation at the ant shoulder     Treatments:  Patient instructed in HEP including: gear shift exercises 20X, wand assisted seated elevation, and elevation/er 10X and resisted shoulder er and abd with yellow theraband 10X each (10 minutes)    EDUCATION: HEP       Goals:  Active       PT Problem       PT Goal 1       Start:  11/19/24    Expected End:  02/17/25       Shoulder pain 5/10 or less         PT Goal 2       Start:  11/19/24    Expected End:  02/17/25       Improve quickdash by 20%         PT Goal 3       Start:  11/19/24    Expected End:  02/17/25       Maximize shoulder ROM         PT Goal 4       Start:  11/19/24    Expected End:  02/17/25       Shoulder strength 5/5

## 2024-11-21 ENCOUNTER — HOSPITAL ENCOUNTER (EMERGENCY)
Facility: HOSPITAL | Age: 64
Discharge: HOME | End: 2024-11-22
Attending: STUDENT IN AN ORGANIZED HEALTH CARE EDUCATION/TRAINING PROGRAM
Payer: MEDICAID

## 2024-11-21 VITALS
RESPIRATION RATE: 18 BRPM | BODY MASS INDEX: 29.45 KG/M2 | SYSTOLIC BLOOD PRESSURE: 109 MMHG | HEIGHT: 60 IN | TEMPERATURE: 97.7 F | OXYGEN SATURATION: 98 % | DIASTOLIC BLOOD PRESSURE: 55 MMHG | HEART RATE: 94 BPM | WEIGHT: 150 LBS

## 2024-11-21 DIAGNOSIS — H10.33 ACUTE BACTERIAL CONJUNCTIVITIS OF BOTH EYES: Primary | ICD-10-CM

## 2024-11-21 DIAGNOSIS — S05.8X2A: ICD-10-CM

## 2024-11-21 PROCEDURE — 99283 EMERGENCY DEPT VISIT LOW MDM: CPT

## 2024-11-21 PROCEDURE — 2500000005 HC RX 250 GENERAL PHARMACY W/O HCPCS: Mod: SE | Performed by: PHYSICIAN ASSISTANT

## 2024-11-21 PROCEDURE — 99284 EMERGENCY DEPT VISIT MOD MDM: CPT | Performed by: STUDENT IN AN ORGANIZED HEALTH CARE EDUCATION/TRAINING PROGRAM

## 2024-11-21 PROCEDURE — 2500000001 HC RX 250 WO HCPCS SELF ADMINISTERED DRUGS (ALT 637 FOR MEDICARE OP): Mod: SE | Performed by: PHYSICIAN ASSISTANT

## 2024-11-21 RX ORDER — KETOROLAC TROMETHAMINE 15 MG/ML
15 INJECTION, SOLUTION INTRAMUSCULAR; INTRAVENOUS ONCE
Status: DISCONTINUED | OUTPATIENT
Start: 2024-11-21 | End: 2024-11-21

## 2024-11-21 RX ORDER — POLYMYXIN B SULFATE AND TRIMETHOPRIM 1; 10000 MG/ML; [USP'U]/ML
1 SOLUTION OPHTHALMIC EVERY 4 HOURS
Qty: 10 ML | Refills: 0 | Status: SHIPPED | OUTPATIENT
Start: 2024-11-21 | End: 2024-11-28

## 2024-11-21 RX ORDER — ERYTHROMYCIN 5 MG/G
OINTMENT OPHTHALMIC NIGHTLY
Qty: 3.5 G | Refills: 0 | Status: SHIPPED | OUTPATIENT
Start: 2024-11-21 | End: 2024-12-01

## 2024-11-21 RX ORDER — ACETAMINOPHEN 325 MG/1
975 TABLET ORAL ONCE
Status: COMPLETED | OUTPATIENT
Start: 2024-11-21 | End: 2024-11-21

## 2024-11-21 RX ORDER — TETRACAINE HYDROCHLORIDE 5 MG/ML
1 SOLUTION OPHTHALMIC ONCE
Status: COMPLETED | OUTPATIENT
Start: 2024-11-21 | End: 2024-11-21

## 2024-11-21 RX ADMIN — TETRACAINE HYDROCHLORIDE 1 DROP: 5 SOLUTION OPHTHALMIC at 20:32

## 2024-11-21 RX ADMIN — FLUORESCEIN SODIUM 2 STRIP: 1 STRIP OPHTHALMIC at 20:32

## 2024-11-21 RX ADMIN — ACETAMINOPHEN 975 MG: 325 TABLET ORAL at 20:32

## 2024-11-21 ASSESSMENT — LIFESTYLE VARIABLES
TOTAL SCORE: 0
HAVE YOU EVER FELT YOU SHOULD CUT DOWN ON YOUR DRINKING: NO
EVER FELT BAD OR GUILTY ABOUT YOUR DRINKING: NO
EVER HAD A DRINK FIRST THING IN THE MORNING TO STEADY YOUR NERVES TO GET RID OF A HANGOVER: NO
HAVE PEOPLE ANNOYED YOU BY CRITICIZING YOUR DRINKING: NO

## 2024-11-21 ASSESSMENT — PAIN DESCRIPTION - FREQUENCY: FREQUENCY: CONSTANT/CONTINUOUS

## 2024-11-21 ASSESSMENT — PAIN DESCRIPTION - LOCATION
LOCATION: EYE
LOCATION: EYE

## 2024-11-21 ASSESSMENT — VISUAL ACUITY: OU: 1

## 2024-11-21 ASSESSMENT — PAIN SCALES - GENERAL: PAINLEVEL_OUTOF10: 6

## 2024-11-21 ASSESSMENT — PAIN DESCRIPTION - PAIN TYPE: TYPE: ACUTE PAIN

## 2024-11-21 ASSESSMENT — PAIN - FUNCTIONAL ASSESSMENT: PAIN_FUNCTIONAL_ASSESSMENT: 0-10

## 2024-11-21 ASSESSMENT — PAIN DESCRIPTION - DESCRIPTORS: DESCRIPTORS: ACHING

## 2024-11-21 ASSESSMENT — PAIN DESCRIPTION - ORIENTATION: ORIENTATION: LEFT

## 2024-11-22 NOTE — DISCHARGE INSTRUCTIONS
You were seen in the emergency department for evaluation of eye pain and drainage.    Your exam showed an abrasion to the left eye.  And is consistent with pinkeye.    You were prescribed Polytrim and erythromycin.  Keep your follow-up with your eye doctor on the 25th.    Please follow-up with your primary care provider.  If you do not have a primary care provider you can call [phone] to make an appointment.    Please do not hesitate to return to the ED if symptoms change or worsen.

## 2024-11-22 NOTE — ED PROVIDER NOTES
HPI   Chief Complaint   Patient presents with    Eye Problem       HPI  Patient is a 64-year-old male with a past medical history of hypertension and T2DM arriving via EMS for evaluation of bilateral eye pain and drainage x 3 days. Patient reports increasing swelling around his eyes and clear drainage.  Patient is waking up with his eyes crusted shut.  He has had eye symptoms before very similar to this and been on Polytrim.  Patient reports symptoms started in right eye and moved to the left eye.  Pain is worse in left eye.  He denies fever, chills, nausea, vomiting, shortness of breath, chest pain, dysuria, hematuria.  He wears glasses.  To my attending, patient reports that he plans to see an eye doctor on Monday.    Patient History   Past Medical History:   Diagnosis Date    Anxiety     Diabetes mellitus (Multi)     HLD (hyperlipidemia)     Hypertension      Past Surgical History:   Procedure Laterality Date    CT ABDOMEN PELVIS ANGIOGRAM W AND/OR WO IV CONTRAST  12/16/2022    CT ABDOMEN PELVIS ANGIOGRAM W AND/OR WO IV CONTRAST 12/16/2022 [facility]    CT ABDOMEN PELVIS ANGIOGRAM W AND/OR WO IV CONTRAST  8/18/2023    CT ABDOMEN PELVIS ANGIOGRAM W AND/OR WO IV CONTRAST 8/18/2023 [facility] [place]    CT ABDOMEN PELVIS ANGIOGRAM W AND/OR WO IV CONTRAST  9/3/2023    CT ABDOMEN PELVIS ANGIOGRAM W AND/OR WO IV CONTRAST 9/3/2023 [facility] [place]     No family history on file.  Social History     Tobacco Use    Smoking status: Every Day     Current packs/day: 0.50     Types: Cigarettes    Smokeless tobacco: Never   Vaping Use    Vaping status: Never Used   Substance Use Topics    Alcohol use: Not Currently    Drug use: Never       Physical Exam   ED Triage Vitals [11/21/24 1924]   Temperature Heart Rate Respirations BP   36.5 °C (97.7 °F) 94 18 109/55      Pulse Ox Temp Source Heart Rate Source Patient Position   97 % Temporal -- Sitting      BP Location FiO2 (%)     Left arm --       Physical Exam  Vitals reviewed.    Constitutional:       Appearance: Normal appearance.   HENT:      Head: Normocephalic and atraumatic.      Nose: No congestion or rhinorrhea.      Mouth/Throat:      Mouth: Mucous membranes are moist.      Pharynx: Oropharynx is clear. No oropharyngeal exudate or posterior oropharyngeal erythema.   Eyes:      General: Vision grossly intact. Gaze aligned appropriately. No scleral icterus.        Right eye: Discharge present. No foreign body.         Left eye: Discharge present.No foreign body.      Extraocular Movements: Extraocular movements intact.      Right eye: Normal extraocular motion and no nystagmus.      Left eye: Normal extraocular motion and no nystagmus.      Conjunctiva/sclera:      Right eye: Right conjunctiva is injected. No chemosis, exudate or hemorrhage.     Left eye: Left conjunctiva is injected. No chemosis, exudate or hemorrhage.     Pupils: Pupils are equal, round, and reactive to light.      Comments: Visual acuity is 20/100 bilaterally.  He is not wearing his glasses.   Cardiovascular:      Rate and Rhythm: Normal rate and regular rhythm.      Pulses: Normal pulses.      Heart sounds: Normal heart sounds. No murmur heard.     No friction rub.   Pulmonary:      Effort: Pulmonary effort is normal. No respiratory distress.      Breath sounds: Normal breath sounds. No stridor. No wheezing or rhonchi.   Musculoskeletal:      Cervical back: Neck supple. No rigidity or tenderness.   Lymphadenopathy:      Cervical: No cervical adenopathy.   Skin:     General: Skin is warm and dry.      Capillary Refill: Capillary refill takes less than 2 seconds.   Neurological:      General: No focal deficit present.      Mental Status: He is alert.   Psychiatric:         Mood and Affect: Mood normal.         Behavior: Behavior normal.           ED Course & MDM   Diagnoses as of 11/21/24 2059   Acute bacterial conjunctivitis of both eyes   Abrasion of eye, left, initial encounter                 No data recorded      [place] Coma Scale Score: 15 (11/21/24 1929 : Rickie Palacios RN)                           Medical Decision Making  Patient is a 64-year-old male past medical history of hypertension and T2DM that presents to the ED for evaluation of bilateral eye pain and drainage x 3 days.  On exam patient is well-appearing in no acute distress.  Vital signs are stable.  Physical exam significant for bilateral conjunctival injection without hemorrhage or chemosis.  EOM are intact.  He has some irritation with EOM but no pain.  Eye bags appear to be patient's baseline.  No overlying erythema or warmth.  Differential includes but is not limited to bacterial conjunctivitis, viral conjunctivitis.  Low suspicion for preseptal/septal cellulitis based on physical exam findings.  Patient staffed with ED attending physician. Pain treated in ED with Tylenol.    Fluorescein stain shows a small abrasion in the left eye laterally.  No Brianna sign.    Social determinants of health affecting care:  None     Patient was informed of the aforementioned findings.  Symptoms are felt to be due to bacterial conjunctivitis and eye abrasion.  Patient will be prescribed Polytrim.  Patient reports he has a appointment with ophthalmology on Monday.    At this time, low suspicion for an acute process that would necessitate further emergent workup, urgent specialist consultation, or hospitalization.  Based on clinical workup and discussion with attending physician, the disposition of discharge is appropriate.  Advised patient to pursue close follow-up with PCP.  Patient was provided with appropriate information to assist in obtaining the proper follow-up.  Discussed strict return to ED protocols with patient, including low threshold to return for changing/worsening symptoms.  Patient demonstrated understanding and agreement with the plan of care, and all questions answered prior to discharge.  Patient stable at time of discharge.      --------------------------------------------------------------------------------------------------------------------------------------------------------------------------------------------------------------------------    This note was dictated using a speech recognition program.  While an attempt was made at proof reading to minimize errors, minor errors in transcription may be present call for questions.     Procedure  Procedures     Fatou Borjas PA-C  11/21/24 2112       Todd Moreno MD  11/21/24 1630

## 2024-11-22 NOTE — ED TRIAGE NOTES
Pt arrived to ED via [facility] for left eye pain and itching that started around 1200 noon today. Pt also endorsed fluid drainage pout of his left eye

## 2024-11-23 ENCOUNTER — HOSPITAL ENCOUNTER (EMERGENCY)
Facility: HOSPITAL | Age: 64
Discharge: HOME | End: 2024-11-24
Attending: STUDENT IN AN ORGANIZED HEALTH CARE EDUCATION/TRAINING PROGRAM
Payer: MEDICAID

## 2024-11-23 VITALS
TEMPERATURE: 97.7 F | HEIGHT: 60 IN | OXYGEN SATURATION: 95 % | SYSTOLIC BLOOD PRESSURE: 112 MMHG | RESPIRATION RATE: 16 BRPM | BODY MASS INDEX: 29.45 KG/M2 | WEIGHT: 150 LBS | DIASTOLIC BLOOD PRESSURE: 68 MMHG | HEART RATE: 62 BPM

## 2024-11-23 DIAGNOSIS — L85.3 DRY SKIN: Primary | ICD-10-CM

## 2024-11-23 PROCEDURE — 99283 EMERGENCY DEPT VISIT LOW MDM: CPT | Performed by: STUDENT IN AN ORGANIZED HEALTH CARE EDUCATION/TRAINING PROGRAM

## 2024-11-23 PROCEDURE — 99282 EMERGENCY DEPT VISIT SF MDM: CPT

## 2024-11-23 ASSESSMENT — PAIN SCALES - GENERAL: PAINLEVEL_OUTOF10: 10 - WORST POSSIBLE PAIN

## 2024-11-23 ASSESSMENT — PAIN - FUNCTIONAL ASSESSMENT: PAIN_FUNCTIONAL_ASSESSMENT: 0-10

## 2024-11-23 ASSESSMENT — PAIN DESCRIPTION - LOCATION: LOCATION: GROIN

## 2024-11-24 NOTE — ED TRIAGE NOTES
Patient presents to the ED for groin pain that started earlier today. Patient is endorsing a burning sensation.

## 2024-11-24 NOTE — DISCHARGE INSTRUCTIONS
Please return if your wound starts to have any drainage, worsening pain, if you have fevers chills, burning when you pee.  You have been sent a prescription for a topical cream to use.  You can use any type of over-the-counter lotion for hydration, would advise you to avoid fragrance as this can be irritating.  Please follow-up with your primary care provider in the coming days for reevaluation or return to the emergency department if any of the above symptoms are to occur.

## 2024-11-25 ENCOUNTER — APPOINTMENT (OUTPATIENT)
Dept: OPHTHALMOLOGY | Facility: CLINIC | Age: 64
End: 2024-11-25
Payer: MEDICAID

## 2024-11-25 DIAGNOSIS — H01.00B BLEPHARITIS OF UPPER AND LOWER EYELIDS OF BOTH EYES, UNSPECIFIED TYPE: Primary | ICD-10-CM

## 2024-11-25 DIAGNOSIS — H01.00A BLEPHARITIS OF UPPER AND LOWER EYELIDS OF BOTH EYES, UNSPECIFIED TYPE: Primary | ICD-10-CM

## 2024-11-25 PROCEDURE — 99203 OFFICE O/P NEW LOW 30 MIN: CPT | Performed by: OPTOMETRIST

## 2024-11-25 RX ORDER — EYELID CLEANSER COMBINATION 13
1 PADS, MEDICATED (EA) TOPICAL
Qty: 60 EACH | Refills: 2 | Status: SHIPPED | OUTPATIENT
Start: 2024-11-25 | End: 2025-01-24

## 2024-11-25 RX ORDER — CARBOXYMETHYLCELLULOSE SODIUM, GLYCERIN AND POLYSORBATE 80 5; 10; 5 MG/ML; MG/ML; MG/ML
1 SOLUTION/ DROPS OPHTHALMIC 4 TIMES DAILY
Qty: 60 EACH | Refills: 2 | Status: SHIPPED | OUTPATIENT
Start: 2024-11-25 | End: 2025-01-24

## 2024-11-25 ASSESSMENT — CONF VISUAL FIELD
OD_NORMAL: 1
OD_INFERIOR_TEMPORAL_RESTRICTION: 0
OS_SUPERIOR_TEMPORAL_RESTRICTION: 0
OS_INFERIOR_TEMPORAL_RESTRICTION: 0
OS_NORMAL: 1
METHOD: COUNTING FINGERS
OD_INFERIOR_NASAL_RESTRICTION: 0
OD_SUPERIOR_NASAL_RESTRICTION: 0
OS_SUPERIOR_NASAL_RESTRICTION: 0
OD_SUPERIOR_TEMPORAL_RESTRICTION: 0
OS_INFERIOR_NASAL_RESTRICTION: 0

## 2024-11-25 ASSESSMENT — EXTERNAL EXAM - RIGHT EYE: OD_EXAM: NORMAL

## 2024-11-25 ASSESSMENT — ENCOUNTER SYMPTOMS: EYES NEGATIVE: 1

## 2024-11-25 ASSESSMENT — VISUAL ACUITY
OS_SC: 20/50
OS_PH_SC: 20/40
METHOD: SNELLEN - LINEAR
OD_SC: 20/60
OD_PH_SC: 20/40
OD_PH_SC+: -2

## 2024-11-25 ASSESSMENT — TONOMETRY
IOP_METHOD: TONOPEN
OS_IOP_MMHG: 20
OD_IOP_MMHG: 17

## 2024-11-25 ASSESSMENT — EXTERNAL EXAM - LEFT EYE: OS_EXAM: NORMAL

## 2024-11-25 NOTE — ASSESSMENT & PLAN NOTE
Pt was seen in ED on 11/21/24 for acute bacterial conjunctivitis in both eyes. He reports today symptoms ongoing for last several months. Reports pain, redness and watery eyes in both eyes. Anterior evaluation today shows 2+ diffuse injection, severe anterior blepharitis on both upper and lower eyelids. No cells/flare, corneal staining noted today, intraocular pressure (IOP) WNL. Diffuse dry skin on face and head noted today.   Pt educated on findings. Advised to use previously Rx'ed polytrim BID in both eyes for prophylaxis. Need to initiate Ocusoft Lid Scrubs twice per day - in addition to washing eyelids twice per day with warm water. Discussed importance of eyelid hygiene. Also start refresh PFAT throughout the day and daily warm compresses for 10 minutes. RTC 2-3 months for followup - if improving condition, can perform CEE at next appointment. Pt voiced understanding.

## 2024-11-25 NOTE — PROGRESS NOTES
Assessment/Plan   Problem List Items Addressed This Visit          Eye/Vision problems    Blepharitis of upper and lower eyelids of both eyes - Primary     Pt was seen in ED on 11/21/24 for acute bacterial conjunctivitis in both eyes. He reports today symptoms ongoing for last several months. Reports pain, redness and watery eyes in both eyes. Anterior evaluation today shows 2+ diffuse injection, severe anterior blepharitis on both upper and lower eyelids. No cells/flare, corneal staining noted today, intraocular pressure (IOP) WNL. Diffuse dry skin on face and head noted today.   Pt educated on findings. Advised to use previously Rx'ed polytrim BID in both eyes for prophylaxis. Need to initiate Ocusoft Lid Scrubs twice per day - in addition to washing eyelids twice per day with warm water. Discussed importance of eyelid hygiene. Also start refresh PFAT throughout the day and daily warm compresses for 10 minutes. RTC 2-3 months for followup - if improving condition, can perform CEE at next appointment. Pt voiced understanding.          Relevant Medications    miscellaneous medical supply (Ocusoft Eyelid Cleansing Pads) pad    [id number] (Refresh Digital PF) 0.5-1-0.5 % dropperette

## 2024-12-05 ENCOUNTER — APPOINTMENT (OUTPATIENT)
Dept: DERMATOLOGY | Facility: CLINIC | Age: 64
End: 2024-12-05
Payer: MEDICAID

## 2024-12-18 ENCOUNTER — APPOINTMENT (OUTPATIENT)
Dept: DERMATOLOGY | Facility: CLINIC | Age: 64
End: 2024-12-18
Payer: MEDICAID

## 2025-01-14 DIAGNOSIS — L30.9 DERMATITIS: ICD-10-CM

## 2025-01-16 RX ORDER — CLOBETASOL PROPIONATE 0.5 MG/G
CREAM TOPICAL
Qty: 60 G | Refills: 10 | Status: SHIPPED | OUTPATIENT
Start: 2025-01-16

## 2025-01-22 ENCOUNTER — APPOINTMENT (OUTPATIENT)
Dept: DERMATOLOGY | Facility: CLINIC | Age: 65
End: 2025-01-22
Payer: MEDICAID

## 2025-01-22 ENCOUNTER — APPOINTMENT (OUTPATIENT)
Dept: ORTHOPEDIC SURGERY | Facility: HOSPITAL | Age: 65
End: 2025-01-22
Payer: MEDICAID

## 2025-01-27 ENCOUNTER — DOCUMENTATION (OUTPATIENT)
Dept: PHYSICAL THERAPY | Facility: CLINIC | Age: 65
End: 2025-01-27

## 2025-01-27 ENCOUNTER — OFFICE VISIT (OUTPATIENT)
Dept: ORTHOPEDIC SURGERY | Facility: HOSPITAL | Age: 65
End: 2025-01-27
Payer: MEDICAID

## 2025-01-27 DIAGNOSIS — M25.512 CHRONIC LEFT SHOULDER PAIN: ICD-10-CM

## 2025-01-27 DIAGNOSIS — M25.512 ACUTE PAIN OF LEFT SHOULDER: ICD-10-CM

## 2025-01-27 DIAGNOSIS — G89.29 CHRONIC LEFT SHOULDER PAIN: ICD-10-CM

## 2025-01-27 PROCEDURE — 99214 OFFICE O/P EST MOD 30 MIN: CPT | Mod: 25 | Performed by: ORTHOPAEDIC SURGERY

## 2025-01-27 PROCEDURE — 2500000004 HC RX 250 GENERAL PHARMACY W/ HCPCS (ALT 636 FOR OP/ED): Performed by: ORTHOPAEDIC SURGERY

## 2025-01-27 PROCEDURE — 99204 OFFICE O/P NEW MOD 45 MIN: CPT | Performed by: ORTHOPAEDIC SURGERY

## 2025-01-27 PROCEDURE — 20610 DRAIN/INJ JOINT/BURSA W/O US: CPT | Mod: LT | Performed by: ORTHOPAEDIC SURGERY

## 2025-01-27 PROCEDURE — 4010F ACE/ARB THERAPY RXD/TAKEN: CPT | Performed by: ORTHOPAEDIC SURGERY

## 2025-01-27 RX ORDER — LIDOCAINE HYDROCHLORIDE 10 MG/ML
1 INJECTION, SOLUTION INFILTRATION; PERINEURAL
Status: COMPLETED | OUTPATIENT
Start: 2025-01-27 | End: 2025-01-27

## 2025-01-27 RX ORDER — TRIAMCINOLONE ACETONIDE 40 MG/ML
10 INJECTION, SUSPENSION INTRA-ARTICULAR; INTRAMUSCULAR
Status: COMPLETED | OUTPATIENT
Start: 2025-01-27 | End: 2025-01-27

## 2025-01-27 RX ADMIN — LIDOCAINE HYDROCHLORIDE 1 ML: 10 INJECTION, SOLUTION INFILTRATION; PERINEURAL at 09:23

## 2025-01-27 RX ADMIN — TRIAMCINOLONE ACETONIDE 10 MG: 400 INJECTION, SUSPENSION INTRA-ARTICULAR; INTRAMUSCULAR at 09:23

## 2025-01-27 NOTE — PROGRESS NOTES
Physical Therapy    Discharge Summary    Name: Link Macias  MRN: [id number]  : 1960  Date: 25    Discharge Summary: PT    Discharge Information: Date of discharge 25, Date of last visit 24, Date of evaluation 24, Number of attended visits 1, Referred by Aliza Cisse, and Referred for shoulder pain    Therapy Summary: Patient seen in PT for one visit for shoulder pain.  Patient DNS for several follow up PT visits.      Discharge Status: Status unknown.      Rehab Discharge Reason: Failed to schedule and/or keep follow-up appointment(s)

## 2025-01-27 NOTE — PROGRESS NOTES
Patient is here for evaluation of his left shoulder his left shoulder chronic pain for several months.  He has not had any specific treatment for it except for physical therapy which was not helpful.  Patient is diabetic and vasculopath he has bilateral above-the-knee amputations.  A1c is not known he has also had a procedure with an implant placed over the left chest and tunnel down into the abdomen exact nature of his implant is unclear.    The patient is pleasant and cooperative.  The patient is alert and oriented ×3.  Auditory function is intact.  The patient is a fair historian.  The patient is not in acute distress.  Eye exam significant for nonicteric sclera, intact ocular muscle movement.  Breathing is rhythmic symmetric and nonlabored.  Patient has nearly full range of motion of his left shoulder but pain at extremes of range of motion particular external rotation and abduction.  His motor power in abduction is 5 external rotation 5 internal rotation 5  strength 5.  Slight touch sensation over the shoulder arm form and hand is intact his integument is intact with no lesion scars laceration abrasions or contusions.  He has a well-healed scar over the left anterior chest with a palpable mass tunneled down to the abdomen.  Patient radiographs demonstrate arthritic degenerative changes and loss of sphericity of the humeral head.  Subacromial space is preserved there is surgical clips in the left anterior chest.     Left shoulder glenohumeral arthritis    We discussed options for treatment including potential benefit of injection.  I do not recommend surgical intervention yet.  We did discuss possible risk of cortisone injection and patient understands to have his blood sugar monitored today.  I have given him an intra-articular corticosteroid injection which she tolerated very well.  I have asked the patient to call next week to report the results.    This was dictated using voice recognition software and  not corrected for grammatical or spelling errors.    L Inj/Asp: L glenohumeral on 1/27/2025 9:23 AM  Indications: pain  Details: 22 G needle, posterior approach  Medications: 10 mg triamcinolone acetonide 40 mg/mL; 1 mL lidocaine 10 mg/mL (1 %)

## 2025-02-23 ENCOUNTER — HOSPITAL ENCOUNTER (OUTPATIENT)
Facility: HOSPITAL | Age: 65
Setting detail: OBSERVATION
LOS: 1 days | Discharge: HOME | End: 2025-02-24
Attending: STUDENT IN AN ORGANIZED HEALTH CARE EDUCATION/TRAINING PROGRAM | Admitting: NURSE PRACTITIONER
Payer: MEDICAID

## 2025-02-23 ENCOUNTER — CLINICAL SUPPORT (OUTPATIENT)
Dept: EMERGENCY MEDICINE | Facility: HOSPITAL | Age: 65
End: 2025-02-23
Payer: MEDICAID

## 2025-02-23 DIAGNOSIS — R68.89 FLU-LIKE SYMPTOMS: ICD-10-CM

## 2025-02-23 DIAGNOSIS — R53.81 MALAISE: Primary | ICD-10-CM

## 2025-02-23 DIAGNOSIS — N17.9 AKI (ACUTE KIDNEY INJURY) (CMS-HCC): ICD-10-CM

## 2025-02-23 LAB
ALBUMIN SERPL BCP-MCNC: 4.4 G/DL (ref 3.4–5)
ALP SERPL-CCNC: 99 U/L (ref 33–136)
ALT SERPL W P-5'-P-CCNC: 10 U/L (ref 10–52)
ANION GAP SERPL CALC-SCNC: 21 MMOL/L (ref 10–20)
APPEARANCE UR: CLEAR
AST SERPL W P-5'-P-CCNC: 23 U/L (ref 9–39)
ATRIAL RATE: 100 BPM
BASOPHILS # BLD AUTO: 0.02 X10*3/UL (ref 0–0.1)
BASOPHILS NFR BLD AUTO: 0.4 %
BILIRUB SERPL-MCNC: 0.5 MG/DL (ref 0–1.2)
BILIRUB UR STRIP.AUTO-MCNC: NEGATIVE MG/DL
BUN SERPL-MCNC: 28 MG/DL (ref 6–23)
CALCIUM SERPL-MCNC: 9.6 MG/DL (ref 8.6–10.6)
CARDIAC TROPONIN I PNL SERPL HS: 16 NG/L (ref 0–53)
CARDIAC TROPONIN I PNL SERPL HS: 20 NG/L (ref 0–53)
CHLORIDE SERPL-SCNC: 100 MMOL/L (ref 98–107)
CO2 SERPL-SCNC: 20 MMOL/L (ref 21–32)
COLOR UR: ABNORMAL
CREAT SERPL-MCNC: 1.4 MG/DL (ref 0.5–1.3)
EGFRCR SERPLBLD CKD-EPI 2021: 56 ML/MIN/1.73M*2
EOSINOPHIL # BLD AUTO: 0.02 X10*3/UL (ref 0–0.7)
EOSINOPHIL NFR BLD AUTO: 0.4 %
ERYTHROCYTE [DISTWIDTH] IN BLOOD BY AUTOMATED COUNT: 17.4 % (ref 11.5–14.5)
FLUAV RNA RESP QL NAA+PROBE: NOT DETECTED
FLUBV RNA RESP QL NAA+PROBE: NOT DETECTED
GLUCOSE SERPL-MCNC: 155 MG/DL (ref 74–99)
GLUCOSE UR STRIP.AUTO-MCNC: ABNORMAL MG/DL
HCT VFR BLD AUTO: 43.9 % (ref 41–52)
HGB BLD-MCNC: 14.5 G/DL (ref 13.5–17.5)
IMM GRANULOCYTES # BLD AUTO: 0.01 X10*3/UL (ref 0–0.7)
IMM GRANULOCYTES NFR BLD AUTO: 0.2 % (ref 0–0.9)
KETONES UR STRIP.AUTO-MCNC: ABNORMAL MG/DL
LEUKOCYTE ESTERASE UR QL STRIP.AUTO: NEGATIVE
LYMPHOCYTES # BLD AUTO: 1.26 X10*3/UL (ref 1.2–4.8)
LYMPHOCYTES NFR BLD AUTO: 25.1 %
MCH RBC QN AUTO: 24.5 PG (ref 26–34)
MCHC RBC AUTO-ENTMCNC: 33 G/DL (ref 32–36)
MCV RBC AUTO: 74 FL (ref 80–100)
MONOCYTES # BLD AUTO: 0.47 X10*3/UL (ref 0.1–1)
MONOCYTES NFR BLD AUTO: 9.4 %
NEUTROPHILS # BLD AUTO: 3.23 X10*3/UL (ref 1.2–7.7)
NEUTROPHILS NFR BLD AUTO: 64.5 %
NITRITE UR QL STRIP.AUTO: NEGATIVE
NRBC BLD-RTO: 0 /100 WBCS (ref 0–0)
P AXIS: 69 DEGREES
P OFFSET: 172 MS
P ONSET: 127 MS
PH UR STRIP.AUTO: 5.5 [PH]
PLATELET # BLD AUTO: 207 X10*3/UL (ref 150–450)
POTASSIUM SERPL-SCNC: 4.7 MMOL/L (ref 3.5–5.3)
PR INTERVAL: 194 MS
PROT SERPL-MCNC: 7.2 G/DL (ref 6.4–8.2)
PROT UR STRIP.AUTO-MCNC: ABNORMAL MG/DL
Q ONSET: 224 MS
QRS COUNT: 16 BEATS
QRS DURATION: 66 MS
QT INTERVAL: 352 MS
QTC CALCULATION(BAZETT): 454 MS
QTC FREDERICIA: 417 MS
R AXIS: 53 DEGREES
RBC # BLD AUTO: 5.92 X10*6/UL (ref 4.5–5.9)
RBC # UR STRIP.AUTO: ABNORMAL MG/DL
RBC #/AREA URNS AUTO: NORMAL /HPF
SARS-COV-2 RNA RESP QL NAA+PROBE: NOT DETECTED
SODIUM SERPL-SCNC: 136 MMOL/L (ref 136–145)
SP GR UR STRIP.AUTO: 1.03
T AXIS: 169 DEGREES
T OFFSET: 400 MS
UROBILINOGEN UR STRIP.AUTO-MCNC: NORMAL MG/DL
VENTRICULAR RATE: 100 BPM
WBC # BLD AUTO: 5 X10*3/UL (ref 4.4–11.3)
WBC #/AREA URNS AUTO: NORMAL /HPF

## 2025-02-23 PROCEDURE — 80053 COMPREHEN METABOLIC PANEL: CPT

## 2025-02-23 PROCEDURE — 84484 ASSAY OF TROPONIN QUANT: CPT | Performed by: NURSE PRACTITIONER

## 2025-02-23 PROCEDURE — 81001 URINALYSIS AUTO W/SCOPE: CPT

## 2025-02-23 PROCEDURE — 36415 COLL VENOUS BLD VENIPUNCTURE: CPT

## 2025-02-23 PROCEDURE — 2500000001 HC RX 250 WO HCPCS SELF ADMINISTERED DRUGS (ALT 637 FOR MEDICARE OP): Mod: SE

## 2025-02-23 PROCEDURE — 80307 DRUG TEST PRSMV CHEM ANLYZR: CPT | Performed by: NURSE PRACTITIONER

## 2025-02-23 PROCEDURE — 99285 EMERGENCY DEPT VISIT HI MDM: CPT | Performed by: STUDENT IN AN ORGANIZED HEALTH CARE EDUCATION/TRAINING PROGRAM

## 2025-02-23 PROCEDURE — 83930 ASSAY OF BLOOD OSMOLALITY: CPT | Performed by: NURSE PRACTITIONER

## 2025-02-23 PROCEDURE — 84484 ASSAY OF TROPONIN QUANT: CPT

## 2025-02-23 PROCEDURE — 83036 HEMOGLOBIN GLYCOSYLATED A1C: CPT | Performed by: NURSE PRACTITIONER

## 2025-02-23 PROCEDURE — 93005 ELECTROCARDIOGRAM TRACING: CPT

## 2025-02-23 PROCEDURE — 82550 ASSAY OF CK (CPK): CPT | Performed by: NURSE PRACTITIONER

## 2025-02-23 PROCEDURE — 84133 ASSAY OF URINE POTASSIUM: CPT | Performed by: NURSE PRACTITIONER

## 2025-02-23 PROCEDURE — 87636 SARSCOV2 & INF A&B AMP PRB: CPT

## 2025-02-23 PROCEDURE — 83935 ASSAY OF URINE OSMOLALITY: CPT | Performed by: NURSE PRACTITIONER

## 2025-02-23 PROCEDURE — 85025 COMPLETE CBC W/AUTO DIFF WBC: CPT

## 2025-02-23 RX ORDER — ACETAMINOPHEN 325 MG/1
975 TABLET ORAL ONCE
Status: COMPLETED | OUTPATIENT
Start: 2025-02-23 | End: 2025-02-23

## 2025-02-23 RX ADMIN — ACETAMINOPHEN 975 MG: 325 TABLET ORAL at 21:05

## 2025-02-23 ASSESSMENT — PAIN SCALES - GENERAL
PAINLEVEL_OUTOF10: 0 - NO PAIN
PAINLEVEL_OUTOF10: 8
PAINLEVEL_OUTOF10: 7

## 2025-02-23 ASSESSMENT — LIFESTYLE VARIABLES
TOTAL SCORE: 0
EVER HAD A DRINK FIRST THING IN THE MORNING TO STEADY YOUR NERVES TO GET RID OF A HANGOVER: NO
HAVE YOU EVER FELT YOU SHOULD CUT DOWN ON YOUR DRINKING: NO
HAVE PEOPLE ANNOYED YOU BY CRITICIZING YOUR DRINKING: NO
EVER FELT BAD OR GUILTY ABOUT YOUR DRINKING: NO

## 2025-02-23 ASSESSMENT — PAIN - FUNCTIONAL ASSESSMENT: PAIN_FUNCTIONAL_ASSESSMENT: 0-10

## 2025-02-23 ASSESSMENT — PAIN DESCRIPTION - ORIENTATION: ORIENTATION: RIGHT;LEFT

## 2025-02-23 ASSESSMENT — PAIN DESCRIPTION - LOCATION: LOCATION: GENERALIZED

## 2025-02-23 ASSESSMENT — PAIN DESCRIPTION - DESCRIPTORS: DESCRIPTORS: SHARP

## 2025-02-23 NOTE — ED TRIAGE NOTES
"BIB [facility] for flu like sxs X1 day. Pt guarded,tearful, and selectively nonverbal to staff. Pt states, \"come on man I just dont feel well\". Unable to obtain medical hx.   "

## 2025-02-23 NOTE — DISCHARGE INSTRUCTIONS
Please follow-up with your primary care provider in regards to his ED visit.  If you have any new chest pain, shortness of breath or new concerning symptoms please return to the ED for reevaluation.

## 2025-02-23 NOTE — ED PROVIDER NOTES
EMERGENCY DEPARTMENT ENCOUNTER      Pt Name: Link Macias  MRN: [id number]  Birthdate 1960  Date of evaluation: 2/23/2025    HISTORY OF PRESENT ILLNESS    Link Macias is an 64 y.o. male with history including anxiety, type 2 diabetes, hyperlipidemia, hypertension, PVD s/p bilateral hip disarticulation's, epilepsy presenting to the emergency department for cold-like symptoms.  Patient came in complaining of cold-like symptoms.  He states has been going on for a couple of days now.  Patient denies fevers, chills, chest pain, shortness of breath, nausea, vomiting, diarrhea, constipation.  Overall patient just states that he feels unwell.  No known sick contacts.      PAST MEDICAL HISTORY     Past Medical History:   Diagnosis Date    Anxiety     Diabetes mellitus (Multi)     HLD (hyperlipidemia)     Hypertension        SURGICAL HISTORY       Past Surgical History:   Procedure Laterality Date    CT ABDOMEN PELVIS ANGIOGRAM W AND/OR WO IV CONTRAST  12/16/2022    CT ABDOMEN PELVIS ANGIOGRAM W AND/OR WO IV CONTRAST 12/16/2022 [facility]    CT ABDOMEN PELVIS ANGIOGRAM W AND/OR WO IV CONTRAST  8/18/2023    CT ABDOMEN PELVIS ANGIOGRAM W AND/OR WO IV CONTRAST 8/18/2023 [facility] [place] [facility]    CT ABDOMEN PELVIS ANGIOGRAM W AND/OR WO IV CONTRAST  9/3/2023    CT ABDOMEN PELVIS ANGIOGRAM W AND/OR WO IV CONTRAST 9/3/2023 [facility] [place] [facility]       CURRENT MEDICATIONS       Previous Medications    ASPIRIN 81 MG EC TABLET    Take 1 tablet (81 mg) by mouth once daily.    ATORVASTATIN (LIPITOR) 80 MG TABLET    Take 1 tablet (80 mg) by mouth once daily at bedtime.    CHLORHEXIDINE (HIBICLENS) 4 % EXTERNAL LIQUID    Apply topically once daily as needed for wound care.    CHOLECALCIFEROL (VITAMIN D-3) 25 MCG (1000 UT) CAPSULE    Take 1 capsule (25 mcg) by mouth once daily.    CLOBETASOL (TEMOVATE) 0.05 % CREAM    APPLY TOPICALLY TO CHEST AND BACK DAILY UNTIL YOU SEE MD BACK IN 6 WEEKS.    DIPHENHYDRAMINE (BENADRYL) 25 MG CAPSULE    Take 1 capsule  (25 mg) by mouth every 8 hours if needed for itching or allergies for up to 3 days.    DOCUSATE SODIUM (COLACE) 100 MG CAPSULE    Take 1 capsule (100 mg) by mouth 2 times a day as needed for constipation.    DULOXETINE (CYMBALTA) 60 MG DR CAPSULE    Take 1 capsule (60 mg) by mouth once daily.    EMOLLIENT LOTION    Apply topically if needed for dry skin.    EMPAGLIFLOZIN (JARDIANCE) 25 MG    Take 1 tablet (25 mg) by mouth once daily with breakfast.    FAMOTIDINE (PEPCID) 20 MG TABLET    Take 1 tablet (20 mg) by mouth once daily.    GABAPENTIN (NEURONTIN) 100 MG CAPSULE    Take 1 capsule (100 mg) by mouth 3 times a day.    GENTAMICIN (GARAMYCIN) 0.1 % OINTMENT    Apply 1 Application topically once daily. apply ointment to affected arms and shoulders area.    HYDROCORTISONE (ANUSOL-HC) 2.5 % RECTAL CREAM    Insert 1 Application into the rectum 2 times a day as needed for hemorrhoids.    HYDROCORTISONE 2.5 % CREAM    Apply topically 2 times a day. To rash on genitals. 14 days on, 7 days off. Repeat as needed for rash.    HYDROCORTISONE 2.5 % CREAM    Apply topically 2 times a day.    HYDROXYZINE HCL (ATARAX) 25 MG TABLET    Take 1 tablet (25 mg) by mouth once daily as needed for itching or anxiety.    KETOCONAZOLE (NIZORAL) 2 % CREAM    Apply topically to both ears once daily.    KETOCONAZOLE (NIZORAL) 2 % SHAMPOO    Apply topically 2 times a week.    LIDOCAINE 4 % PATCH    Place 1 patch over 12 hours on the skin once daily. Remove & discard patch within 12 hours or as directed by MD.    LOSARTAN (COZAAR) 100 MG TABLET    Take 1 tablet (100 mg) by mouth once daily.    MELATONIN 3 MG TABLET    Take 1 tablet (3 mg) by mouth once daily at bedtime.    METFORMIN (GLUCOPHAGE) 500 MG TABLET    Take 1 tablet (500 mg) by mouth 2 times a day with meals.    METHOCARBAMOL (ROBAXIN) 500 MG TABLET    Take 1 tablet (500 mg) by mouth 2 times a day as needed for muscle spasms.    METOPROLOL TARTRATE (LOPRESSOR) 25 MG TABLET    Take 1  tablet (25 mg) by mouth 2 times a day.    NAPROXEN (NAPROSYN) 375 MG TABLET    Take 1 tablet (375 mg) by mouth every 12 hours if needed for mild pain (1 - 3).    NITROGLYCERIN (NITROSTAT) 0.4 MG SL TABLET    Place 1 tablet (0.4 mg) under the tongue every 5 minutes if needed for chest pain. Up to 3 times. IF NO RELIEF CALL 911    OMEGA-3 ACID ETHYL ESTERS (LOVAZA) 1 GRAM CAPSULE    Take 2 capsules (2 g) by mouth once daily.    OXCARBAZEPINE (TRILEPTAL) 150 MG TABLET    Take 1 tablet (150 mg) by mouth 2 times a day.    PANTOPRAZOLE (PROTONIX) 20 MG EC TABLET    Take 1 tablet (20 mg) by mouth once daily in the morning. Take before meals. Do not crush, chew, or split. Patient takes as needed.    PERMETHRIN (ELIMITE) 5 % CREAM    APPLY TOPICALLY FROM NECK DOWN, INCLUDING PENIS, HANDS FINGERS, CHEST, BACK (EVERYWHERE) LET SIT OVERNIGHT, THEN WASH OFF AND REPEAT 1 WEEK LATER    POLYETHYLENE GLYCOL (MIRALAX) 17 GRAM/DOSE POWDER    Mix 17 g of powder and drink 2 times a day.    PRAMOXINE (PROCTOFOAM) 1 % FOAM    1 aplication topically 2 to 3 times a day and after bowel movements    PREGABALIN (LYRICA) 75 MG CAPSULE    Take 1 capsule (75 mg) by mouth 2 times a day.    TRIAMCINOLONE (KENALOG) 0.1 % CREAM    Apply topically 2 times a day. To rash on trunk and extremities. 14 days on, 7 days off. Repeat as needed for rash.    TRIAMCINOLONE (KENALOG) 0.1 % OINTMENT    Apply topically 2 times a day.       ALLERGIES     Penicillins    FAMILY HISTORY       Family History   Problem Relation Name Age of Onset    Glaucoma Neg Hx      Macular degeneration Neg Hx          SOCIAL HISTORY       Social History     Socioeconomic History    Marital status: Single   Tobacco Use    Smoking status: Every Day     Current packs/day: 0.50     Types: Cigarettes    Smokeless tobacco: Never   Vaping Use    Vaping status: Never Used   Substance and Sexual Activity    Alcohol use: Not Currently    Drug use: Never    Sexual activity: Defer     Social  Drivers of Health     Financial Resource Strain: Patient Declined (9/16/2024)    Overall Financial Resource Strain (CARDIA)     Difficulty of Paying Living Expenses: Patient declined   Food Insecurity: Patient Declined (9/15/2024)    Hunger Vital Sign     Worried About Running Out of Food in the Last Year: Patient declined     Ran Out of Food in the Last Year: Patient declined   Transportation Needs: No Transportation Needs (10/18/2024)    OASIS : Transportation     Lack of Transportation (Medical): No     Lack of Transportation (Non-Medical): No     Patient Unable or Declines to Respond: No   Physical Activity: Insufficiently Active (9/15/2024)    Exercise Vital Sign     Days of Exercise per Week: 2 days     Minutes of Exercise per Session: 30 min   Stress: Patient Declined (9/15/2024)    [employer] - Occupational Stress Questionnaire     Feeling of Stress : Patient declined   Social Connections: Feeling Socially Integrated (10/18/2024)    OASIS : Social Isolation     Frequency of experiencing loneliness or isolation: Rarely   Intimate Partner Violence: Patient Declined (9/15/2024)    Humiliation, Afraid, Rape, and Kick questionnaire     Fear of Current or Ex-Partner: Patient declined     Emotionally Abused: Patient declined     Physically Abused: Patient declined     Sexually Abused: Patient declined   Housing Stability: Patient Declined (9/16/2024)    Housing Stability Vital Sign     Unable to Pay for Housing in the Last Year: Patient declined     Number of Times Moved in the Last Year: 0     Homeless in the Last Year: Patient declined       PHYSICAL EXAM       ED Triage Vitals [02/23/25 1210]   Temperature Heart Rate Respirations BP   36.7 °C (98 °F) 96 16 110/68      SpO2 Temp src Heart Rate Source Patient Position   -- -- -- --      BP Location FiO2 (%)     -- --       Physical Exam  Vitals and nursing note reviewed.   Constitutional:       General: He is not in acute  distress.     Appearance: He is well-developed.   HENT:      Head: Normocephalic and atraumatic.   Eyes:      Conjunctiva/sclera: Conjunctivae normal.   Cardiovascular:      Rate and Rhythm: Normal rate and regular rhythm.      Pulses: Normal pulses.      Heart sounds: No murmur heard.  Pulmonary:      Effort: Pulmonary effort is normal. No respiratory distress.      Breath sounds: Normal breath sounds.   Abdominal:      Palpations: Abdomen is soft.      Tenderness: There is no abdominal tenderness.   Musculoskeletal:      Comments: Bilateral lower extremity amputation at the hip   Skin:     General: Skin is warm and dry.   Neurological:      General: No focal deficit present.      Mental Status: He is alert and oriented to person, place, and time.          DIAGNOSTIC RESULTS     LABS:  Labs Reviewed - No data to display    All other labs were within normal range or not returned as of this dictation.    Imaging  No orders to display        Procedures  Procedures     EMERGENCY DEPARTMENT COURSE/MDM:   Medical Decision Making  Link Macias is an 64 y.o. male with history including anxiety, type 2 diabetes, hyperlipidemia, hypertension, PVD, epilepsy presenting to the emergency department for cold-like symptoms.  After attending spoke with the patient, he admitted that he is just feeling hungry and wanted to eat some food.  We will viral swab him and provide some food for the patient.    At discharge patient started to complain of chest pain.  Labs and EKG and troponin added to his workup.  Pending these results we will likely patient will be able to be discharged.  Care transition to Dr. Hook.        ED Course as of 02/24/25 1917   Sun Feb 23, 2025   1635 EKG 1215: Sinus tachycardia 100 bpm, parable 194, QTc 454, no ST elevations or depressions, some T wave changes in V1 similar to previous EKG on 11/16/2024 [SK]   1721 After patient was discharged and nursing staff is trying given his paperwork he told him that  he was now having chest pain.  He described it as sharp in nature did not radiate anywhere else no shortness of breath.  When asked patient about he also send my hands are cramping.  Patient did admit to the attending that he wanted to be here for food.  Based on this we will get a repeat EKG to make sure there is no signs of acute ischemic changes but sharp pain is low suspicion for ACS. [SK]   1757 Patient is EKG shows the same nondescript changes seen on previous EKG that were there in the past.  Attending feels like they may have slightly increased and we will get labs to rule out ACS. [SK]      ED Course User Index  [SK] Ave Ingram DO         Diagnoses as of 02/24/25 1917   Malaise   Flu-like symptoms   YVETTE (acute kidney injury) (CMS-[facility])        External records reviewed: recent inpatient, clinic, and prior ED notes  Labs and Diagnostic imaging independently reviewed/interpreted by me.    Patient plan, care, lab results and imaging were all discussed with attending.    ED Medications administered this visit:  Medications - No data to display  New Prescriptions from this visit:    New Prescriptions    No medications on file       (Please note that portions of this note were completed with a voice recognition program.  Efforts were made to edit the dictations but occasionally words are mis-transcribed.)     Ave Ingram DO  Resident  02/24/25 1927

## 2025-02-24 VITALS
HEIGHT: 63 IN | SYSTOLIC BLOOD PRESSURE: 147 MMHG | HEART RATE: 61 BPM | BODY MASS INDEX: 26.58 KG/M2 | OXYGEN SATURATION: 97 % | WEIGHT: 150 LBS | DIASTOLIC BLOOD PRESSURE: 62 MMHG | TEMPERATURE: 96.8 F | RESPIRATION RATE: 17 BRPM

## 2025-02-24 PROBLEM — R53.81 MALAISE: Status: ACTIVE | Noted: 2025-02-24

## 2025-02-24 PROBLEM — N17.9 AKI (ACUTE KIDNEY INJURY) (CMS-HCC): Status: ACTIVE | Noted: 2025-02-24

## 2025-02-24 PROBLEM — R53.81 MALAISE: Status: RESOLVED | Noted: 2025-02-24 | Resolved: 2025-02-24

## 2025-02-24 LAB
ALBUMIN SERPL BCP-MCNC: 4.2 G/DL (ref 3.4–5)
AMPHETAMINES UR QL SCN: ABNORMAL
ANION GAP BLDV CALCULATED.4IONS-SCNC: 9 MMOL/L (ref 10–25)
ANION GAP SERPL CALC-SCNC: 15 MMOL/L (ref 10–20)
BARBITURATES UR QL SCN: ABNORMAL
BASE EXCESS BLDV CALC-SCNC: 2.8 MMOL/L (ref -2–3)
BENZODIAZ UR QL SCN: ABNORMAL
BODY TEMPERATURE: 37 DEGREES CELSIUS
BUN SERPL-MCNC: 32 MG/DL (ref 6–23)
BZE UR QL SCN: ABNORMAL
CA-I BLDV-SCNC: 1.19 MMOL/L (ref 1.1–1.33)
CALCIUM SERPL-MCNC: 9.1 MG/DL (ref 8.6–10.6)
CANNABINOIDS UR QL SCN: ABNORMAL
CARDIAC TROPONIN I PNL SERPL HS: 23 NG/L (ref 0–53)
CHLORIDE BLDV-SCNC: 101 MMOL/L (ref 98–107)
CHLORIDE SERPL-SCNC: 103 MMOL/L (ref 98–107)
CHLORIDE UR-SCNC: <15 MMOL/L
CHLORIDE/CREATININE (MMOL/G) IN URINE: NORMAL
CK SERPL-CCNC: 389 U/L (ref 0–325)
CK SERPL-CCNC: 643 U/L (ref 0–325)
CO2 SERPL-SCNC: 23 MMOL/L (ref 21–32)
CREAT SERPL-MCNC: 0.95 MG/DL (ref 0.5–1.3)
CREAT UR-MCNC: 108.5 MG/DL (ref 20–370)
EGFRCR SERPLBLD CKD-EPI 2021: 89 ML/MIN/1.73M*2
ERYTHROCYTE [DISTWIDTH] IN BLOOD BY AUTOMATED COUNT: 17.3 % (ref 11.5–14.5)
EST. AVERAGE GLUCOSE BLD GHB EST-MCNC: 154 MG/DL
FENTANYL+NORFENTANYL UR QL SCN: ABNORMAL
GLUCOSE BLD MANUAL STRIP-MCNC: 138 MG/DL (ref 74–99)
GLUCOSE BLD MANUAL STRIP-MCNC: 261 MG/DL (ref 74–99)
GLUCOSE BLDV-MCNC: 140 MG/DL (ref 74–99)
GLUCOSE SERPL-MCNC: 129 MG/DL (ref 74–99)
HBA1C MFR BLD: 7 %
HCO3 BLDV-SCNC: 29 MMOL/L (ref 22–26)
HCT VFR BLD AUTO: 46.1 % (ref 41–52)
HCT VFR BLD EST: 46 % (ref 41–52)
HGB BLD-MCNC: 14.6 G/DL (ref 13.5–17.5)
HGB BLDV-MCNC: 15.2 G/DL (ref 13.5–17.5)
HOLD SPECIMEN: NORMAL
INHALED O2 CONCENTRATION: 21 %
LACTATE BLDV-SCNC: 1.8 MMOL/L (ref 0.4–2)
MAGNESIUM SERPL-MCNC: 2.35 MG/DL (ref 1.6–2.4)
MCH RBC QN AUTO: 24.1 PG (ref 26–34)
MCHC RBC AUTO-ENTMCNC: 31.7 G/DL (ref 32–36)
MCV RBC AUTO: 76 FL (ref 80–100)
METHADONE UR QL SCN: ABNORMAL
NRBC BLD-RTO: 0 /100 WBCS (ref 0–0)
OPIATES UR QL SCN: ABNORMAL
OSMOLALITY SERPL: 301 MOSM/KG (ref 280–300)
OSMOLALITY UR: 733 MOSM/KG (ref 200–1200)
OXYCODONE+OXYMORPHONE UR QL SCN: ABNORMAL
OXYHGB MFR BLDV: 63 % (ref 45–75)
PCO2 BLDV: 49 MM HG (ref 41–51)
PCP UR QL SCN: ABNORMAL
PH BLDV: 7.38 PH (ref 7.33–7.43)
PHOSPHATE SERPL-MCNC: 3.4 MG/DL (ref 2.5–4.9)
PLATELET # BLD AUTO: 188 X10*3/UL (ref 150–450)
PO2 BLDV: 42 MM HG (ref 35–45)
POTASSIUM BLDV-SCNC: 4.5 MMOL/L (ref 3.5–5.3)
POTASSIUM SERPL-SCNC: 4.2 MMOL/L (ref 3.5–5.3)
POTASSIUM UR-SCNC: 37 MMOL/L
POTASSIUM/CREAT UR-RTO: 34 MMOL/G CREAT
RBC # BLD AUTO: 6.07 X10*6/UL (ref 4.5–5.9)
SAO2 % BLDV: 64 % (ref 45–75)
SODIUM BLDV-SCNC: 134 MMOL/L (ref 136–145)
SODIUM SERPL-SCNC: 137 MMOL/L (ref 136–145)
SODIUM UR-SCNC: 16 MMOL/L
SODIUM/CREAT UR-RTO: 15 MMOL/G CREAT
WBC # BLD AUTO: 3.6 X10*3/UL (ref 4.4–11.3)

## 2025-02-24 PROCEDURE — 82550 ASSAY OF CK (CPK): CPT | Performed by: NURSE PRACTITIONER

## 2025-02-24 PROCEDURE — G0378 HOSPITAL OBSERVATION PER HR: HCPCS

## 2025-02-24 PROCEDURE — 96361 HYDRATE IV INFUSION ADD-ON: CPT

## 2025-02-24 PROCEDURE — 84295 ASSAY OF SERUM SODIUM: CPT | Mod: 59 | Performed by: NURSE PRACTITIONER

## 2025-02-24 PROCEDURE — 83735 ASSAY OF MAGNESIUM: CPT | Performed by: NURSE PRACTITIONER

## 2025-02-24 PROCEDURE — 82947 ASSAY GLUCOSE BLOOD QUANT: CPT

## 2025-02-24 PROCEDURE — 99223 1ST HOSP IP/OBS HIGH 75: CPT | Performed by: NURSE PRACTITIONER

## 2025-02-24 PROCEDURE — 36415 COLL VENOUS BLD VENIPUNCTURE: CPT | Performed by: NURSE PRACTITIONER

## 2025-02-24 PROCEDURE — 96372 THER/PROPH/DIAG INJ SC/IM: CPT | Performed by: NURSE PRACTITIONER

## 2025-02-24 PROCEDURE — 2500000002 HC RX 250 W HCPCS SELF ADMINISTERED DRUGS (ALT 637 FOR MEDICARE OP, ALT 636 FOR OP/ED): Mod: SE | Performed by: NURSE PRACTITIONER

## 2025-02-24 PROCEDURE — 2500000001 HC RX 250 WO HCPCS SELF ADMINISTERED DRUGS (ALT 637 FOR MEDICARE OP): Mod: SE | Performed by: NURSE PRACTITIONER

## 2025-02-24 PROCEDURE — 2500000004 HC RX 250 GENERAL PHARMACY W/ HCPCS (ALT 636 FOR OP/ED): Mod: SE | Performed by: NURSE PRACTITIONER

## 2025-02-24 PROCEDURE — 80069 RENAL FUNCTION PANEL: CPT | Performed by: NURSE PRACTITIONER

## 2025-02-24 PROCEDURE — 85027 COMPLETE CBC AUTOMATED: CPT | Performed by: NURSE PRACTITIONER

## 2025-02-24 PROCEDURE — 96360 HYDRATION IV INFUSION INIT: CPT | Mod: 59

## 2025-02-24 PROCEDURE — 82947 ASSAY GLUCOSE BLOOD QUANT: CPT | Mod: 59

## 2025-02-24 RX ORDER — POLYETHYLENE GLYCOL 3350 17 G/17G
17 POWDER, FOR SOLUTION ORAL 2 TIMES DAILY
Status: DISCONTINUED | OUTPATIENT
Start: 2025-02-24 | End: 2025-02-24 | Stop reason: HOSPADM

## 2025-02-24 RX ORDER — PANTOPRAZOLE SODIUM 20 MG/1
20 TABLET, DELAYED RELEASE ORAL DAILY
Status: DISCONTINUED | OUTPATIENT
Start: 2025-02-24 | End: 2025-02-24 | Stop reason: HOSPADM

## 2025-02-24 RX ORDER — OXCARBAZEPINE 150 MG/1
150 TABLET, FILM COATED ORAL 2 TIMES DAILY
Status: DISCONTINUED | OUTPATIENT
Start: 2025-02-24 | End: 2025-02-24 | Stop reason: HOSPADM

## 2025-02-24 RX ORDER — ACETAMINOPHEN 325 MG/1
650 TABLET ORAL EVERY 6 HOURS PRN
Status: DISCONTINUED | OUTPATIENT
Start: 2025-02-24 | End: 2025-02-24 | Stop reason: HOSPADM

## 2025-02-24 RX ORDER — PREGABALIN 75 MG/1
75 CAPSULE ORAL 2 TIMES DAILY
Status: DISCONTINUED | OUTPATIENT
Start: 2025-02-24 | End: 2025-02-24 | Stop reason: HOSPADM

## 2025-02-24 RX ORDER — INSULIN LISPRO 100 [IU]/ML
0-10 INJECTION, SOLUTION INTRAVENOUS; SUBCUTANEOUS
Status: DISCONTINUED | OUTPATIENT
Start: 2025-02-24 | End: 2025-02-24 | Stop reason: HOSPADM

## 2025-02-24 RX ORDER — ATORVASTATIN CALCIUM 20 MG/1
80 TABLET, FILM COATED ORAL NIGHTLY
Status: DISCONTINUED | OUTPATIENT
Start: 2025-02-24 | End: 2025-02-24 | Stop reason: HOSPADM

## 2025-02-24 RX ORDER — HYDROCORTISONE 1 %
1 CREAM (GRAM) TOPICAL 2 TIMES DAILY
COMMUNITY
Start: 2025-02-16

## 2025-02-24 RX ORDER — ACETAMINOPHEN 500 MG
5 TABLET ORAL NIGHTLY PRN
Status: DISCONTINUED | OUTPATIENT
Start: 2025-02-24 | End: 2025-02-24 | Stop reason: HOSPADM

## 2025-02-24 RX ORDER — DEXTROSE 50 % IN WATER (D50W) INTRAVENOUS SYRINGE
12.5
Status: DISCONTINUED | OUTPATIENT
Start: 2025-02-24 | End: 2025-02-24 | Stop reason: HOSPADM

## 2025-02-24 RX ORDER — CHOLECALCIFEROL (VITAMIN D3) 25 MCG
1000 TABLET ORAL DAILY
Status: DISCONTINUED | OUTPATIENT
Start: 2025-02-24 | End: 2025-02-24 | Stop reason: HOSPADM

## 2025-02-24 RX ORDER — NITROGLYCERIN 0.4 MG/1
0.4 TABLET SUBLINGUAL EVERY 5 MIN PRN
Status: DISCONTINUED | OUTPATIENT
Start: 2025-02-24 | End: 2025-02-24 | Stop reason: HOSPADM

## 2025-02-24 RX ORDER — METOPROLOL TARTRATE 50 MG/1
25 TABLET ORAL 2 TIMES DAILY
Status: DISCONTINUED | OUTPATIENT
Start: 2025-02-24 | End: 2025-02-24 | Stop reason: HOSPADM

## 2025-02-24 RX ORDER — SODIUM CHLORIDE, SODIUM LACTATE, POTASSIUM CHLORIDE, CALCIUM CHLORIDE 600; 310; 30; 20 MG/100ML; MG/100ML; MG/100ML; MG/100ML
100 INJECTION, SOLUTION INTRAVENOUS CONTINUOUS
Status: DISCONTINUED | OUTPATIENT
Start: 2025-02-24 | End: 2025-02-24

## 2025-02-24 RX ORDER — FAMOTIDINE 40 MG/1
20 TABLET, FILM COATED ORAL DAILY
Status: DISCONTINUED | OUTPATIENT
Start: 2025-02-24 | End: 2025-02-24 | Stop reason: HOSPADM

## 2025-02-24 RX ORDER — INSULIN GLARGINE 100 [IU]/ML
INJECTION, SOLUTION SUBCUTANEOUS
COMMUNITY
Start: 2025-01-29 | End: 2025-02-24 | Stop reason: ENTERED-IN-ERROR

## 2025-02-24 RX ORDER — HEPARIN SODIUM 5000 [USP'U]/ML
5000 INJECTION, SOLUTION INTRAVENOUS; SUBCUTANEOUS EVERY 8 HOURS SCHEDULED
Status: DISCONTINUED | OUTPATIENT
Start: 2025-02-24 | End: 2025-02-24 | Stop reason: HOSPADM

## 2025-02-24 RX ORDER — DEXTROSE 50 % IN WATER (D50W) INTRAVENOUS SYRINGE
25
Status: DISCONTINUED | OUTPATIENT
Start: 2025-02-24 | End: 2025-02-24 | Stop reason: HOSPADM

## 2025-02-24 RX ORDER — ASPIRIN 81 MG/1
81 TABLET ORAL DAILY
Status: DISCONTINUED | OUTPATIENT
Start: 2025-02-24 | End: 2025-02-24 | Stop reason: HOSPADM

## 2025-02-24 RX ORDER — ACETAMINOPHEN 160 MG/5ML
650 SOLUTION ORAL EVERY 6 HOURS PRN
Status: DISCONTINUED | OUTPATIENT
Start: 2025-02-24 | End: 2025-02-24 | Stop reason: HOSPADM

## 2025-02-24 RX ORDER — DULOXETIN HYDROCHLORIDE 30 MG/1
60 CAPSULE, DELAYED RELEASE ORAL DAILY
Status: DISCONTINUED | OUTPATIENT
Start: 2025-02-24 | End: 2025-02-24 | Stop reason: HOSPADM

## 2025-02-24 RX ADMIN — Medication 1000 UNITS: at 08:27

## 2025-02-24 RX ADMIN — ASPIRIN 81 MG: 81 TABLET, COATED ORAL at 08:27

## 2025-02-24 RX ADMIN — FAMOTIDINE 20 MG: 40 TABLET ORAL at 08:27

## 2025-02-24 RX ADMIN — PANTOPRAZOLE SODIUM 20 MG: 20 TABLET, DELAYED RELEASE ORAL at 08:27

## 2025-02-24 RX ADMIN — PREGABALIN 75 MG: 75 CAPSULE ORAL at 08:27

## 2025-02-24 RX ADMIN — HEPARIN SODIUM 5000 UNITS: 5000 INJECTION, SOLUTION INTRAVENOUS; SUBCUTANEOUS at 05:09

## 2025-02-24 RX ADMIN — HEPARIN SODIUM 5000 UNITS: 5000 INJECTION, SOLUTION INTRAVENOUS; SUBCUTANEOUS at 14:30

## 2025-02-24 RX ADMIN — METOPROLOL TARTRATE 25 MG: 50 TABLET, FILM COATED ORAL at 08:27

## 2025-02-24 RX ADMIN — DULOXETINE HYDROCHLORIDE 60 MG: 30 CAPSULE, DELAYED RELEASE ORAL at 08:27

## 2025-02-24 RX ADMIN — OXCARBAZEPINE 150 MG: 150 TABLET, FILM COATED ORAL at 08:33

## 2025-02-24 RX ADMIN — SODIUM CHLORIDE, POTASSIUM CHLORIDE, SODIUM LACTATE AND CALCIUM CHLORIDE 100 ML/HR: 600; 310; 30; 20 INJECTION, SOLUTION INTRAVENOUS at 02:14

## 2025-02-24 ASSESSMENT — ENCOUNTER SYMPTOMS
SHORTNESS OF BREATH: 0
MYALGIAS: 1
DIARRHEA: 0
VOMITING: 0
NAUSEA: 0
WEAKNESS: 1
ABDOMINAL PAIN: 0

## 2025-02-24 ASSESSMENT — PAIN SCALES - GENERAL
PAINLEVEL_OUTOF10: 10 - WORST POSSIBLE PAIN
PAINLEVEL_OUTOF10: 10 - WORST POSSIBLE PAIN

## 2025-02-24 NOTE — PROGRESS NOTES
Pharmacy Admission Order Reconciliation Review    Link Macias is a 64 y.o. male admitted for YVETTE (acute kidney injury) (CMS-[facility]). Pharmacy reviewed the patient's unreconciled admission medications.    Prior to admission medications that were reviewed and acted on by the pharmacist include:  Lubricating eye drop solution   These medications have been reconciled.     Any other unreconcilied medications have been addressed and will be ordered or held by the patient's medical team. Medications addressed by the pharmacist may be added or changed by the patient's medical team at any time.    Arsh Hernandez, Dulce  [facility]  [address]  [facility], Room# 4159  [place] [id number]  Phone: [phone]  Please reach out via Secure Chat for questions, or if no response call Boutir or vocera [facility]

## 2025-02-24 NOTE — H&P
History Of Present Illness  Link Macias is a 64 y.o. male with a PMH of GERD, HLD, PAD, T2DM, arthritis, HTN, gangrene, chronic pain, and bilateral hip disarticulation (2006). Pt presented to ED with c/o flu like symptoms x1. Pt selectively mute with ED staff and he refused to elaborate on symptoms he was experiencing that led to ED presentation. Pt told writer that he was hurting all over and that he did not feel well. Pt closed his eyes and refused to answer any additional questions when seen by writer in ED. Labs obtained as part of ED workup reviewed. Labs notable for YVETTE. EKG obtained while in ED showed sinus tachycardia with no evidence of ischemia. Pt's nurse in ED told writer that pt was about to be discharged from ED but he stated that he was having chest pain. Troponin negative x2. Pt admitted to medicine for further treatment and management of YVETTE.    ED interventions: Tylenol 975 mg x1 dose     Past Medical History  Past Medical History:   Diagnosis Date    Anxiety     Diabetes mellitus (Multi)     HLD (hyperlipidemia)     Hypertension        Surgical History  Past Surgical History:   Procedure Laterality Date    CT ABDOMEN PELVIS ANGIOGRAM W AND/OR WO IV CONTRAST  12/16/2022    CT ABDOMEN PELVIS ANGIOGRAM W AND/OR WO IV [facility]    [facility] ABDOMEN PELVIS ANGIOGRAM W AND/OR WO IV CONTRAST  8/18/2023    CT ABDOMEN PELVIS ANGIOGRAM W AND/OR WO IV CONTRAST [place]18/20[place] [facility]    CT ABDOMEN PELVIS ANGIOGRAM W AND/OR WO IV CONTRAST  9/3/2023    CT ABDOMEN PELVIS ANGIOGRAM W AND/OR WO IV CONTRAST 9[place]3/2023 [facility]        Social History  He reports that he has been smoking cigarettes. He has never used smokeless tobacco. He reports that he does not currently use alcohol. He reports that he does not use drugs.    Family History  Family History   Problem Relation Name Age of Onset    Glaucoma Neg Hx      Macular degeneration Neg Hx          Allergies  Penicillins    Review of Systems  "  Reason unable to perform ROS: limited d/t patient refusal to answer questions.   Respiratory:  Negative for shortness of breath.    Cardiovascular:  Negative for chest pain.   Gastrointestinal:  Negative for abdominal pain, diarrhea, nausea and vomiting.   Musculoskeletal:  Positive for myalgias.   Neurological:  Positive for weakness.        Physical Exam  Constitutional:       General: He is not in acute distress.  HENT:      Head: Normocephalic.      Nose: Nose normal.      Mouth/Throat:      Mouth: Mucous membranes are dry.   Cardiovascular:      Rate and Rhythm: Normal rate.      Pulses: Normal pulses.      Heart sounds: Normal heart sounds.   Pulmonary:      Effort: Pulmonary effort is normal. No respiratory distress.      Breath sounds: Normal breath sounds.   Abdominal:      Palpations: Abdomen is soft.   Musculoskeletal:      Comments: Bilateral hip disarticulation   Skin:     General: Skin is warm.   Neurological:      Mental Status: He is alert.      Comments: HARDIK pt refused to answer questions          Last Recorded Vitals  Blood pressure 109/70, pulse 82, temperature 36.9 °C (98.4 °F), temperature source Oral, resp. rate 18, height 1.041 m (3' 5\"), weight 68 kg (150 lb), SpO2 95%.    Relevant Results  ECG 12 lead    Result Date: 2/23/2025  Normal sinus rhythm Marked ST abnormality, possible inferior subendocardial injury Abnormal ECG When compared with ECG of 16-NOV-2024 16:28, VA interval has decreased T wave inversion no longer evident in Inferior leads QT has lengthened See ED provider note for full interpretation and clinical correlation Confirmed by Mariel Jimenez ([id number]) on 2/23/2025 11:58:26 PM      Results for orders placed or performed during the hospital encounter of 02/23/25 (from the past 24 hours)   ECG 12 lead   Result Value Ref Range    Ventricular Rate 100 BPM    Atrial Rate 100 BPM    VA Interval 194 ms    QRS Duration 66 ms    QT Interval 352 ms    QTC Calculation(Bazett) 454 ms    P " Axis 69 degrees    R Axis 53 degrees    T Axis 169 degrees    QRS Count 16 beats    Q Onset 224 ms    P Onset 127 ms    P Offset 172 ms    T Offset 400 ms    QTC Fredericia 417 ms   Sars-CoV-2 and Influenza A/B PCR   Result Value Ref Range    Flu A Result Not Detected Not Detected    Flu B Result Not Detected Not Detected    Coronavirus 2019, PCR Not Detected Not Detected   Comprehensive metabolic panel   Result Value Ref Range    Glucose 155 (H) 74 - 99 mg/dL    Sodium 136 136 - 145 mmol/L    Potassium 4.7 3.5 - 5.3 mmol/L    Chloride 100 98 - 107 mmol/L    Bicarbonate 20 (L) 21 - 32 mmol/L    Anion Gap 21 (H) 10 - 20 mmol/L    Urea Nitrogen 28 (H) 6 - 23 mg/dL    Creatinine 1.40 (H) 0.50 - 1.30 mg/dL    eGFR 56 (L) >60 mL/min/1.73m*2    Calcium 9.6 8.6 - 10.6 mg/dL    Albumin 4.4 3.4 - 5.0 g/dL    Alkaline Phosphatase 99 33 - 136 U/L    Total Protein 7.2 6.4 - 8.2 g/dL    AST 23 9 - 39 U/L    Bilirubin, Total 0.5 0.0 - 1.2 mg/dL    ALT 10 10 - 52 U/L   CBC and Auto Differential   Result Value Ref Range    WBC 5.0 4.4 - 11.3 x10*3/uL    nRBC 0.0 0.0 - 0.0 /100 WBCs    RBC 5.92 (H) 4.50 - 5.90 x10*6/uL    Hemoglobin 14.5 13.5 - 17.5 g/dL    Hematocrit 43.9 41.0 - 52.0 %    MCV 74 (L) 80 - 100 fL    MCH 24.5 (L) 26.0 - 34.0 pg    MCHC 33.0 32.0 - 36.0 g/dL    RDW 17.4 (H) 11.5 - 14.5 %    Platelets 207 150 - 450 x10*3/uL    Neutrophils % 64.5 40.0 - 80.0 %    Immature Granulocytes %, Automated 0.2 0.0 - 0.9 %    Lymphocytes % 25.1 13.0 - 44.0 %    Monocytes % 9.4 2.0 - 10.0 %    Eosinophils % 0.4 0.0 - 6.0 %    Basophils % 0.4 0.0 - 2.0 %    Neutrophils Absolute 3.23 1.20 - 7.70 x10*3/uL    Immature Granulocytes Absolute, Automated 0.01 0.00 - 0.70 x10*3/uL    Lymphocytes Absolute 1.26 1.20 - 4.80 x10*3/uL    Monocytes Absolute 0.47 0.10 - 1.00 x10*3/uL    Eosinophils Absolute 0.02 0.00 - 0.70 x10*3/uL    Basophils Absolute 0.02 0.00 - 0.10 x10*3/uL   Troponin I, High Sensitivity, Initial   Result Value Ref Range     Troponin I, High Sensitivity (CMC) 16 0 - 53 ng/L   Troponin, High Sensitivity, 1 Hour   Result Value Ref Range    Troponin I, High Sensitivity (CMC) 20 0 - 53 ng/L   Urinalysis with Reflex Culture and Microscopic   Result Value Ref Range    Color, Urine Light-Yellow Light-Yellow, Yellow, Dark-Yellow    Appearance, Urine Clear Clear    Specific Gravity, Urine 1.032 1.005 - 1.035    pH, Urine 5.5 5.0, 5.5, 6.0, 6.5, 7.0, 7.5, 8.0    Protein, Urine 50 (1+) (A) NEGATIVE, 10 (TRACE), 20 (TRACE) mg/dL    Glucose, Urine OVER (4+) (A) Normal mg/dL    Blood, Urine 0.03 (TRACE) (A) NEGATIVE mg/dL    Ketones, Urine TRACE (A) NEGATIVE mg/dL    Bilirubin, Urine NEGATIVE NEGATIVE mg/dL    Urobilinogen, Urine Normal Normal mg/dL    Nitrite, Urine NEGATIVE NEGATIVE    Leukocyte Esterase, Urine NEGATIVE NEGATIVE   Urinalysis Microscopic   Result Value Ref Range    WBC, Urine NONE 1-5, NONE /HPF    RBC, Urine 1-2 NONE, 1-2, 3-5 /HPF   Osmolality, urine   Result Value Ref Range    Osmolality, Urine Random 733 200 - 1,200 mOsm/kg     Lab Results   Component Value Date    HGBA1C 7.0 (A) 11/18/2024      Scheduled medications  aspirin, 81 mg, oral, Daily  atorvastatin, 80 mg, oral, Nightly  cholecalciferol, 1,000 Units, oral, Daily  DULoxetine, 60 mg, oral, Daily  famotidine, 20 mg, oral, Daily  heparin (porcine), 5,000 Units, subcutaneous, q8h GABE  insulin lispro, 0-10 Units, subcutaneous, TID AC  metoprolol tartrate, 25 mg, oral, BID  OXcarbazepine, 150 mg, oral, BID  pantoprazole, 20 mg, oral, Daily  polyethylene glycol, 17 g, oral, BID  pregabalin, 75 mg, oral, BID      Continuous medications  lactated Ringer's, 100 mL/hr      PRN medications  PRN medications: acetaminophen **OR** acetaminophen, dextrose, dextrose, glucagon, glucagon, melatonin       Assessment/Plan     Link Macias is a 64 year old male with a PMH of GERD, HLD, PAD, T2DM, arthritis, HTN, gangrene, chronic pain, and bilateral hip disarticulation (2006). Pt  presented to ED with c/o flu like symptoms x1. Pt selectively mute with ED staff and he refused to elaborate on symptoms he was experiencing that led to ED presentation. Pt told writer that he was hurting all over and that he did not feel well. Pt closed his eyes and refused to answer any additional questions when seen by writer in ED. Labs obtained as part of ED workup reviewed. Labs notable for YVETTE. EKG obtained while in ED showed sinus tachycardia with no evidence of ischemia. Pt admitted to medicine for further treatment and management of YVETTE.    YVETTE  - Cr 1.40 GFR 90 on admit--> Cr 0.9 GFR 95 on 12/12/24  - Etiology: dehydration  - Maintenance fluids: LR @ 100 cc/hr  - Goal urine output >0.5cc/kg/hr.  - Avoid nephrotoxic medication administration  - Labs ordered: Urine electrolytes, Serum osmolality, Urine osmolality, CK level  - Nephrology consult and renal US if renal function does not improve  - Monitor kidney function with repeat labs as well as urine output  - holding home dose of Losartan 100 mg PO every day, Jardiance 25 mg PO every day, and Metformin 500 mg PO BID with meals    Chest pain  - Troponin negative x2  - EKG on admit: sinus tachycardia with no evidence of ischemia.  - last Echo done 8/16/24: The left ventricular systolic function is hyperdynamic, with a visually estimated ejection fraction of 70-75%.There is normal right ventricular global systolic function. Aortic valve sclerosis. Mild aortic valve regurgitation.  - last cardiac stress test done 8/16/24:No evidence of inducible myocardial ischemia or prior infarction. The left ventricle is normal in size. Normal LV wall motion with a post-stress LV EF estimated at 62%.  - low concern for ACS  - monitor on tele  - utox pending result  - Nitroglycerin 0.4 mg SL q5min PRN     T2DM (last HgA1c 7.0% 11/18/24)  - repeat HgA1c pending result  - accucheck ACHS  - Lispro SSI 0-10 units ordered  - diabetic diet  - hypoglycemia order set    HLD  - c/w  home dose of Lipitor 80 mg PO at bedtime    GERD  - c/w home dose of Protonix 20 mg PO every day and Pepcid 20 mg PO QD    HTN  - last /70  - continue to monitor BP  - c/w home dose of Lopressor 25 mg PO BID  - holding home dose of Losartan 100 mg d/t YVETTE    Dispo: admit to RNF. Plan per above. Estimated LOS: 1-2 days.      I spent 75 minutes in the professional and overall care of this patient.      Komal Bender, APRN-CNP

## 2025-02-24 NOTE — PROGRESS NOTES
Music Therapy Note    Link Macias was referred by    Therapy Session  Referral Type: New referral this admission  Visit Type: New visit  Session Start Time: 0924  Session End Time: 0941  Intervention Delivery: In-person  Family Present for Session: None  Number of staff members present: 0     Pre-assessment  Pain Score: 10 - Worst possible pain         Treatment/Interventions  Areas of Focus: Agitation reduction, Pain management  Music Therapy Interventions: Music-assisted relaxation and imagery (TIMOTHY), Empathic listening/validating emotions  Interruption: No  Patient Fell Asleep at End of Session: No    Post-assessment  0-10 (Numeric) Pain Score: 10 - Worst possible pain  Total Session Time (min): 17 minutes    Narrative  Assessment Detail: Patient was referred for music therapy session by Nurse. Patient was in seculded brink area, eating breakfast  and verbalized that their pain was at a 10  Plan: The music therapist engaged the patient in discussion about the purpose of a music therapy sesssion. The therapist  began a music assited relalxation intervention  Intervention: music assited relaxation, supportive discussion  Patient Comments: patient felt no reduction in pain    Education Documentation  No documentation found.

## 2025-02-24 NOTE — PROGRESS NOTES
Emergency Department Transition of Care Note       Signout   I received Link Macias in signout from Dr. Ingram.  Please see the ED Provider Note for all HPI, PE and MDM up to the time of signout at 1900.  This is in addition to the primary record.    In brief Link Macias is an 64 y.o. male presenting for generalized weakness with negative COVID-19 and influenza swabs.  However on attempted discharge, patient complained of chest pain, therefore additional lab work was sent.    At the time of signout we were awaiting:  Lab work, reevaluation, and disposition    ED Course & Medical Decision Making   Medical Decision Making:  Under my care, patient made hemodynamically stable ED.  Lab work was notable for significant YVETTE with normal troponin, urinalysis noting trace blood and ketones.  Given that patient's kidney function is double his baseline creatinine at 0.6-0.8, offered admission for further workup of his YVETTE.  Patient was agreeable to this.  Further discussed with medicine team who accepted the patient for further management.    ED Course:  ED Course as of 02/24/25 0537   Sun Feb 23, 2025   1635 EKG 1215: Sinus tachycardia 100 bpm, parable 194, QTc 454, no ST elevations or depressions, some T wave changes in V1 similar to previous EKG on 11/16/2024 [SK]   1721 After patient was discharged and nursing staff is trying given his paperwork he told him that he was now having chest pain.  He described it as sharp in nature did not radiate anywhere else no shortness of breath.  When asked patient about he also send my hands are cramping.  Patient did admit to the attending that he wanted to be here for food.  Based on this we will get a repeat EKG to make sure there is no signs of acute ischemic changes but sharp pain is low suspicion for ACS. [SK]   1757 Patient is EKG shows the same nondescript changes seen on previous EKG that were there in the past.  Attending feels like they may have slightly increased and we  will get labs to rule out ACS. [SK]      ED Course User Index  [SK] Ave Ingram DO         Diagnoses as of 02/24/25 0537   Malaise   Flu-like symptoms   YVETTE (acute kidney injury) (CMS-HCC)       Disposition   As a result of their workup, the patient will require admission to the hospital.  The patient was informed of his diagnosis.  The patient was given the opportunity to ask questions and I answered them. The patient agreed to be admitted to the hospital.    Procedures   Procedures    Patient seen and discussed with attending physician.    Tangela Aguirre DO  Emergency Medicine

## 2025-02-24 NOTE — PROGRESS NOTES
Music Therapy Note    Link Macias was referred by ***    Therapy Session  Referral Type: New referral this admission  Visit Type: New visit  Session Start Time: 0924  Session End Time: 0941  Intervention Delivery: In-person  Family Present for Session: None  Number of staff members present: 0     Pre-assessment  Pain Score: 10 - Worst possible pain         Treatment/Interventions  Areas of Focus: Agitation reduction, Pain management  Music Therapy Interventions: Music-assisted relaxation and imagery (TIMOTHY), Empathic listening/validating emotions  Interruption: No  Patient Fell Asleep at End of Session: No    Post-assessment  0-10 (Numeric) Pain Score: 10 - Worst possible pain  Total Session Time (min): 17 minutes    Narrative  Assessment Detail: Patient was referred for music therapy session by Nurse. Patient was in seculded brink area, eating breakfast  and verbalized that their pain was at a 10  Plan: The music therapist engaged the patient in discussion about the purpose of a music therapy sesssion. The therapist  began a music assited relalxation intervention  Intervention: music assited relaxation, supportive discussion  Patient Comments: patient felt no reduction in pain    Education Documentation  No documentation found.

## 2025-02-24 NOTE — PROGRESS NOTES
Pharmacy Medication History Review    Link Macias is a 64 y.o. male admitted for YVETTE (acute kidney injury) (CMS-HCC). Pharmacy reviewed the patient's jeyxe-oy-kirqgocxu medications and allergies for accuracy.    Medications ADDED:  Refresh Eye Drops  Medications CHANGED:  None   Medications REMOVED:   Lantus Insulin Pen  Naproxen 375 mg     The list below reflects the updated PTA list.   Prior to Admission Medications   Prescriptions Last Dose Informant   DULoxetine (Cymbalta) 60 mg DR capsule 2/21/2025 Self   Sig: Take 1 capsule (60 mg) by mouth once daily.   OXcarbazepine (Trileptal) 150 mg tablet 2/20/2025 Self   Sig: Take 1 tablet (150 mg) by mouth 2 times a day.   aspirin 81 mg EC tablet 2/20/2025 Self   Sig: Take 1 tablet (81 mg) by mouth once daily.   atorvastatin (Lipitor) 80 mg tablet 2/20/2025 Self   Sig: Take 1 tablet (80 mg) by mouth once daily at bedtime.   cholecalciferol (Vitamin D-3) 25 MCG (1000 UT) capsule 2/20/2025 Self   Sig: Take 1 capsule (25 mcg) by mouth once daily.   clobetasol (Temovate) 0.05 % cream 2/20/2025 Self   Sig: APPLY TOPICALLY TO CHEST AND BACK DAILY UNTIL YOU SEE MD BACK IN 6 WEEKS.   docusate sodium (Colace) 100 mg capsule 2/20/2025 Self   Sig: Take 1 capsule (100 mg) by mouth 2 times a day as needed for constipation.   emollient lotion  Self   Sig: Apply topically if needed for dry skin.   empagliflozin (Jardiance) 25 mg 2/20/2025 Self   Sig: Take 1 tablet (25 mg) by mouth once daily with breakfast.   famotidine (Pepcid) 20 mg tablet 2/20/2025 Self   Sig: Take 1 tablet (20 mg) by mouth once daily.   gentamicin (Garamycin) 0.1 % ointment 2/20/2025 Self   Sig: Apply 1 Application topically once daily. apply ointment to affected arms and shoulders area.   hydrOXYzine HCL (Atarax) 25 mg tablet 2/20/2025 Self   Sig: Take 1 tablet (25 mg) by mouth once daily as needed for itching or anxiety.   hydrocortisone 1 % cream 2/20/2025 Self   Sig: Apply 1 Application topically 2 times a  day.   losartan (Cozaar) 100 mg tablet 2025 Self   Sig: Take 1 tablet (100 mg) by mouth once daily.   lubricating eye drops ophthalmic solution Past Month Self   Sig: Administer 1 drop into both eyes if needed for dry eyes.   melatonin 3 mg tablet 2025 Self   Sig: Take 1 tablet (3 mg) by mouth once daily at bedtime.   metFORMIN (Glucophage) 500 mg tablet 2025 Self   Sig: Take 1 tablet (500 mg) by mouth 2 times a day with meals.   metoprolol tartrate (Lopressor) 25 mg tablet 2025 Self   Sig: Take 1 tablet (25 mg) by mouth 2 times a day.   naproxen (Naprosyn) 375 mg tablet  Self   Sig: Take 1 tablet (375 mg) by mouth every 12 hours if needed for mild pain (1 - 3).   nitroglycerin (Nitrostat) 0.4 mg SL tablet  Self   Sig: Place 1 tablet (0.4 mg) under the tongue every 5 minutes if needed for chest pain. Up to 3 times. IF NO RELIEF CALL 911  Request refill.     omega-3 acid ethyl esters (Lovaza) 1 gram capsule 2025 Self   Sig: Take 2 capsules (2 g) by mouth once daily.   pantoprazole (ProtoNix) 20 mg EC tablet 2025 Self   Sig: Take 1 tablet (20 mg) by mouth once daily in the morning. Take before meals. Do not crush, chew, or split. Patient takes as needed.   permethrin (Elimite) 5 % cream 2025 Self   Sig: APPLY TOPICALLY FROM NECK DOWN, INCLUDING PENIS, HANDS FINGERS, CHEST, BACK (EVERYWHERE) LET SIT OVERNIGHT, THEN WASH OFF AND REPEAT 1 WEEK LATER   polyethylene glycol (Miralax) 17 gram/dose powder Past Month Self   Sig: Mix 17 g of powder and drink 2 times a day.   pramoxine (Proctofoam) 1 % foam  Self   Si aplication topically 2 to 3 times a day and after bowel movements   pregabalin (Lyrica) 75 mg capsule 2025 Self   Sig: Take 1 capsule (75 mg) by mouth 2 times a day.      Facility-Administered Medications: None        The list below reflects the updated allergy list. Please review each documented allergy for additional clarification and justification.  Allergies  Reviewed  "by Teodora Strong on 2/24/2025        Severity Reactions Comments    Penicillins Not Specified Unknown             Patient accepts M2B at discharge.     Sources:   [facility]  Pharmacy dispense history  Patient interview Moderate historian  Chart Review  Care Everywhere     Additional Comments:  Patient stated he was recently discharged from a SNF. The SNF provided him with all medications upon discharge.  Patient stated he needs a refill for Nitroglycerin SL tablets.      Teodora Strong  Pharmacy Technician  02/24/25     Secure Chat preferred   If no response call [phone] or T[facility] \"Med Rec\"   "

## 2025-02-25 ENCOUNTER — APPOINTMENT (OUTPATIENT)
Dept: OPHTHALMOLOGY | Facility: CLINIC | Age: 65
End: 2025-02-25
Payer: MEDICAID

## 2025-02-26 ENCOUNTER — HOSPITAL ENCOUNTER (EMERGENCY)
Facility: HOSPITAL | Age: 65
Discharge: HOME | End: 2025-02-26
Attending: STUDENT IN AN ORGANIZED HEALTH CARE EDUCATION/TRAINING PROGRAM
Payer: MEDICAID

## 2025-02-26 VITALS
BODY MASS INDEX: 23.54 KG/M2 | RESPIRATION RATE: 18 BRPM | OXYGEN SATURATION: 99 % | DIASTOLIC BLOOD PRESSURE: 64 MMHG | TEMPERATURE: 98 F | WEIGHT: 150 LBS | HEIGHT: 67 IN | SYSTOLIC BLOOD PRESSURE: 130 MMHG | HEART RATE: 71 BPM

## 2025-02-26 DIAGNOSIS — L89.151 PRESSURE INJURY OF SACRAL REGION, STAGE 1: Primary | ICD-10-CM

## 2025-02-26 PROCEDURE — 99283 EMERGENCY DEPT VISIT LOW MDM: CPT | Performed by: STUDENT IN AN ORGANIZED HEALTH CARE EDUCATION/TRAINING PROGRAM

## 2025-02-26 PROCEDURE — 2500000001 HC RX 250 WO HCPCS SELF ADMINISTERED DRUGS (ALT 637 FOR MEDICARE OP): Mod: SE | Performed by: PHYSICIAN ASSISTANT

## 2025-02-26 PROCEDURE — 99283 EMERGENCY DEPT VISIT LOW MDM: CPT | Performed by: PHYSICIAN ASSISTANT

## 2025-02-26 RX ORDER — ZINC OXIDE 20 G/100G
1 OINTMENT TOPICAL AS NEEDED
Qty: 56 G | Refills: 0 | Status: SHIPPED | OUTPATIENT
Start: 2025-02-26

## 2025-02-26 RX ORDER — ZINC OXIDE 20 G/100G
1 OINTMENT TOPICAL ONCE
Status: COMPLETED | OUTPATIENT
Start: 2025-02-26 | End: 2025-02-26

## 2025-02-26 RX ORDER — ACETAMINOPHEN 325 MG/1
975 TABLET ORAL ONCE
Status: DISCONTINUED | OUTPATIENT
Start: 2025-02-26 | End: 2025-02-26 | Stop reason: HOSPADM

## 2025-02-26 RX ORDER — ACETAMINOPHEN 500 MG
1000 TABLET ORAL EVERY 8 HOURS PRN
Qty: 30 TABLET | Refills: 0 | Status: SHIPPED | OUTPATIENT
Start: 2025-02-26 | End: 2025-03-08

## 2025-02-26 RX ADMIN — RUGBY ZINC OXIDE 20% 1 APPLICATION: 20 OINTMENT TOPICAL at 16:10

## 2025-02-26 ASSESSMENT — PAIN DESCRIPTION - ORIENTATION: ORIENTATION: RIGHT

## 2025-02-26 ASSESSMENT — PAIN SCALES - GENERAL: PAINLEVEL_OUTOF10: 10 - WORST POSSIBLE PAIN

## 2025-02-26 ASSESSMENT — PAIN DESCRIPTION - PAIN TYPE: TYPE: CHRONIC PAIN

## 2025-02-26 ASSESSMENT — PAIN DESCRIPTION - ONSET: ONSET: ONGOING

## 2025-02-26 ASSESSMENT — PAIN DESCRIPTION - LOCATION: LOCATION: BUTTOCKS

## 2025-02-26 ASSESSMENT — PAIN - FUNCTIONAL ASSESSMENT: PAIN_FUNCTIONAL_ASSESSMENT: 0-10

## 2025-02-26 ASSESSMENT — PAIN DESCRIPTION - DESCRIPTORS: DESCRIPTORS: ACHING

## 2025-02-26 ASSESSMENT — PAIN DESCRIPTION - PROGRESSION: CLINICAL_PROGRESSION: NOT CHANGED

## 2025-02-26 ASSESSMENT — PAIN DESCRIPTION - FREQUENCY: FREQUENCY: CONSTANT/CONTINUOUS

## 2025-02-26 NOTE — DISCHARGE INSTRUCTIONS
Use topical zinc oxide as needed for irritation.  Referred to wound care clinic, follow up with your PCP.    Please call [phone] for Primary Care referral for follow up appointments.

## 2025-02-26 NOTE — ED PROVIDER NOTES
Emergency Department Encounter  [facility] EMERGENCY MEDICINE    Patient: Link Macias  MRN: [id number]  : 1960  Date of Evaluation: 2025  ED Provider: Sherice Almanzar PA-C      Chief Complaint       Chief Complaint   Patient presents with    Wound Check     HPI    Link Macias is a 64 y.o. male who presents to the emergency department presenting for wound check.  Patient reports he is bedbound at baseline secondary to history of bilateral lower extremity amputations.  Patient has a home health aide that comes to his dwelling 5 times a week, was there today.  Notes he sat in his chair upright for a long time overnight, which caused increased pain to his bottom.  Thinks he has a pressure ulcer.  Not taking any medications at home for his complaints.  Reports he thinks his pain gets worse secondary to sitting in his own urine overnight.  Denies any fevers, chills, difficulty urinating or defecating, changes in urinary frequency or urgency, abdominal pain.  No recurrent falls or injuries.    ROS:     Review of Systems  14 systems reviewed and otherwise acutely negative except as in the HPI.    Past History     Past Medical History:   Diagnosis Date    Anxiety     Diabetes mellitus (Multi)     HLD (hyperlipidemia)     Hypertension      Past Surgical History:   Procedure Laterality Date    CT ABDOMEN PELVIS ANGIOGRAM W AND/OR WO IV CONTRAST  2022    CT ABDOMEN PELVIS ANGIOGRAM W AND/OR WO IV CONTRAST 2022 DOCTOR OFFICE [facility]    CT ABDOMEN PELVIS ANGIOGRAM W AND/OR WO IV CONTRAST  2023    CT ABDOMEN PELVIS ANGIOGRAM W AND/OR WO IV CONTRAST 2023 [facility]    CT ABDOMEN PELVIS ANGIOGRAM W AND/OR WO IV CONTRAST  9/3/2023    CT ABDOMEN PELVIS ANGIOGRAM W AND/OR WO IV CONTRAST 9/3/2023 [facility] CT     Social History     Socioeconomic History    Marital status: Single   Tobacco Use    Smoking status: Every Day     Current packs/day: 0.50     Types: Cigarettes    Smokeless  tobacco: Never   Vaping Use    Vaping status: Never Used   Substance and Sexual Activity    Alcohol use: Not Currently    Drug use: Never    Sexual activity: Defer     Social Drivers of Health     Financial Resource Strain: Patient Declined (9/16/2024)    Overall Financial Resource Strain (CARDIA)     Difficulty of Paying Living Expenses: Patient declined   Food Insecurity: Patient Declined (9/15/2024)    Hunger Vital Sign     Worried About Running Out of Food in the Last Year: Patient declined     Ran Out of Food in the Last Year: Patient declined   Transportation Needs: No Transportation Needs (10/18/2024)    OASIS : Transportation     Lack of Transportation (Medical): No     Lack of Transportation (Non-Medical): No     Patient Unable or Declines to Respond: No   Physical Activity: Insufficiently Active (9/15/2024)    Exercise Vital Sign     Days of Exercise per Week: 2 days     Minutes of Exercise per Session: 30 min   Stress: Patient Declined (9/15/2024)    [employer] - Occupational Stress Questionnaire     Feeling of Stress : Patient declined   Social Connections: Feeling Socially Integrated (10/18/2024)    OASIS : Social Isolation     Frequency of experiencing loneliness or isolation: Rarely   Intimate Partner Violence: Patient Declined (9/15/2024)    Humiliation, Afraid, Rape, and Kick questionnaire     Fear of Current or Ex-Partner: Patient declined     Emotionally Abused: Patient declined     Physically Abused: Patient declined     Sexually Abused: Patient declined   Housing Stability: Patient Declined (9/16/2024)    Housing Stability Vital Sign     Unable to Pay for Housing in the Last Year: Patient declined     Number of Times Moved in the Last Year: 0     Homeless in the Last Year: Patient declined       Medications/Allergies     Previous Medications    ASPIRIN 81 MG EC TABLET    Take 1 tablet (81 mg) by mouth once daily.    ATORVASTATIN (LIPITOR) 80 MG TABLET     Take 1 tablet (80 mg) by mouth once daily at bedtime.    CLOBETASOL (TEMOVATE) 0.05 % CREAM    APPLY TOPICALLY TO CHEST AND BACK DAILY UNTIL YOU SEE MD BACK IN 6 WEEKS.    DOCUSATE SODIUM (COLACE) 100 MG CAPSULE    Take 1 capsule (100 mg) by mouth 2 times a day as needed for constipation.    DULOXETINE (CYMBALTA) 60 MG DR CAPSULE    Take 1 capsule (60 mg) by mouth once daily.    EMOLLIENT LOTION    Apply topically if needed for dry skin.    EMPAGLIFLOZIN (JARDIANCE) 25 MG    Take 1 tablet (25 mg) by mouth once daily with breakfast.    FAMOTIDINE (PEPCID) 20 MG TABLET    Take 1 tablet (20 mg) by mouth once daily.    GENTAMICIN (GARAMYCIN) 0.1 % OINTMENT    Apply 1 Application topically once daily. apply ointment to affected arms and shoulders area.    HYDROCORTISONE 1 % CREAM    Apply 1 Application topically 2 times a day.    HYDROXYZINE HCL (ATARAX) 25 MG TABLET    Take 1 tablet (25 mg) by mouth once daily as needed for itching or anxiety.    LOSARTAN (COZAAR) 100 MG TABLET    Take 1 tablet (100 mg) by mouth once daily.    LUBRICATING EYE DROPS OPHTHALMIC SOLUTION    Administer 1 drop into both eyes if needed for dry eyes.    MELATONIN 3 MG TABLET    Take 1 tablet (3 mg) by mouth once daily at bedtime.    METFORMIN (GLUCOPHAGE) 500 MG TABLET    Take 1 tablet (500 mg) by mouth 2 times a day with meals.    METOPROLOL TARTRATE (LOPRESSOR) 25 MG TABLET    Take 1 tablet (25 mg) by mouth 2 times a day.    NITROGLYCERIN (NITROSTAT) 0.4 MG SL TABLET    Place 1 tablet (0.4 mg) under the tongue every 5 minutes if needed for chest pain. Up to 3 times. IF NO RELIEF CALL 911    OMEGA-3 ACID ETHYL ESTERS (LOVAZA) 1 GRAM CAPSULE    Take 2 capsules (2 g) by mouth once daily.    OXCARBAZEPINE (TRILEPTAL) 150 MG TABLET    Take 1 tablet (150 mg) by mouth 2 times a day.    PANTOPRAZOLE (PROTONIX) 20 MG EC TABLET    Take 1 tablet (20 mg) by mouth once daily in the morning. Take before meals. Do not crush, chew, or split. Patient takes  "as needed.    PERMETHRIN (ELIMITE) 5 % CREAM    APPLY TOPICALLY FROM NECK DOWN, INCLUDING PENIS, HANDS FINGERS, CHEST, BACK (EVERYWHERE) LET SIT OVERNIGHT, THEN WASH OFF AND REPEAT 1 WEEK LATER    POLYETHYLENE GLYCOL (MIRALAX) 17 GRAM/DOSE POWDER    Mix 17 g of powder and drink 2 times a day.    PRAMOXINE (PROCTOFOAM) 1 % FOAM    1 aplication topically 2 to 3 times a day and after bowel movements    PREGABALIN (LYRICA) 75 MG CAPSULE    Take 1 capsule (75 mg) by mouth 2 times a day.     Allergies   Allergen Reactions    Penicillins Unknown        Physical Exam       ED Triage Vitals [02/26/25 1214]   Temperature Heart Rate Respirations BP   36.7 °C (98 °F) 75 16 129/50      Pulse Ox Temp Source Heart Rate Source Patient Position   98 % Oral Monitor Lying      BP Location FiO2 (%)     Right arm --         Physical Exam    Physical Exam:     VS: As documented in the triage note and EMR flowsheet from this visit were reviewed.    Appearance: Alert, oriented, cooperative, in no acute distress. Well nourished & well hydrated.    Skin: Warm, intact and dry.     Neck: Supple, without meningismus. No lymphadenopathy.     Pulmonary: Clear bilaterally with good chest wall excursion. No rales, rhonchi or wheezing. No accessory muscle use or stridor.    Cardiac: Normal S1, S2    Abdomen: Soft, nontender, active bowel sounds. No rebound or guarding. No CVA tenderness.    Genitourinary: Chaperone present. Stage 1 pressure wound noted to rectal area - minimal erythema with some skin breakdown. No fluctuance or induration.    Musculoskeletal: Extremities warm and well-perfused.  Spontaneously moving all extremities without limitations.    Neurological: Normal sensation, no weakness.      Diagnostics   Labs:      Radiographs:  No orders to display       EKG:      ED Course   Visit Vitals  /50 (BP Location: Right arm, Patient Position: Lying)   Pulse 75   Temp 36.7 °C (98 °F) (Oral)   Resp 16   Ht 1.702 m (5' 7\")   Wt 68 kg (150 " lb)   SpO2 98%   BMI 23.49 kg/m²   Smoking Status Every Day   BSA 1.79 m²     Medications   zinc oxide 20 % ointment 1 Application (has no administration in time range)       Medical Decision Making     ED Course as of 02/26/25 1420   Wed Feb 26, 2025   1222 Pt is bilateral lower leg amputee for vascular disease who was sent for wound check. He has moist area of skin breakdown between buttocks with no sacral ulcer, cellulitis, signs of infection, or open wounds. We will discuss wound care and getting setup with outpatient wound care. [CR]      ED Course User Index  [CR] Joes M Perez MD         Diagnoses as of 02/26/25 1420   Pressure injury of sacral region, stage 1     SW involved. Pt provided with barrier cream here in ED. Rx for barrier cream and tylenol sent to his preferred pharmacy. Has outpatient PCP appointment scheduled for tomorrow. Staffed with supervising physician, Dr. Perez, who agrees with workup and treatment plan.    Final Impression      1. Pressure injury of sacral region, stage 1          DISPOSITION  Disposition: discharge  Patient condition is: Stable    Comment: Please note this report has been produced using speech recognition software and may contain errors related to that system including errors in grammar, punctuation, and spelling, as well as words and phrases that may be inappropriate.  If there are any questions or concerns please feel free to contact the dictating provider for clarification.    ROYA Holland PA-C  02/26/25 7877

## 2025-02-26 NOTE — ED TRIAGE NOTES
Patient BIB [facility] for wound check. Patient has some MASD between his butt cheeks. Patient states he has an aide come to his apartment 5xs a week and she noticed the skin changes. Patient A&O3, respirations even and unlabored. Patient was here 3 days ago for flu like symptoms and discharged.

## 2025-02-26 NOTE — PROGRESS NOTES
Link Macias is a 64 y.o. male     Assessment/Plan   SW consulted to arrange home health for stage one wound on Pt sd. Pt stated to this SW he had not eaten in two days and would not answer any questions from this SW until SW provided him a meal. SW obtained meal and spoke with Pt. He confirmed he gets primary care for [facility]. He confirmed his aide was at his residence today. Pt unable to recall the agency his aide works for. SW discussed with him need to arrange home health and Pt agreeable to contact [facility]. SW consulted  HH as they had Pt in the past for review.   [facility] [phone]. Spoke with  there who confirmed they don't have skilled nursing services. She suggested their other companies, [employer] [facility]. Spoke with [employer] by phone [phone]. She stated Pt was recently in [facility] for almost ninety days but left last week in order to not lose his income.   [employer] sent to [employer] online. No response received from both agencies and SW left messages with their answering services.   Called Pt's primary care physician. Records reflect Pt has an appointment with them tomorrow. Message left to call this SW about home health need. Kaylin Doe [phone].  PA states pt discharged and transport arranged.   Spoke with Michelle VELÁSQUEZ today. She said they are an independent practice. She said she will ask her RN to arrange home health with one of their pvoviders. SW confirmed this with [facility]SC[facility] Almanzar to cancel [facility] orders.    SILAS Moreno

## 2025-02-28 ENCOUNTER — HOSPITAL ENCOUNTER (EMERGENCY)
Facility: HOSPITAL | Age: 65
Discharge: HOME | End: 2025-03-01
Attending: STUDENT IN AN ORGANIZED HEALTH CARE EDUCATION/TRAINING PROGRAM
Payer: MEDICAID

## 2025-02-28 ENCOUNTER — PHARMACY VISIT (OUTPATIENT)
Dept: PHARMACY | Facility: CLINIC | Age: 65
End: 2025-02-28
Payer: MEDICAID

## 2025-02-28 DIAGNOSIS — L03.313 CELLULITIS OF CHEST WALL: ICD-10-CM

## 2025-02-28 DIAGNOSIS — B02.9 HERPES ZOSTER WITHOUT COMPLICATION: ICD-10-CM

## 2025-02-28 DIAGNOSIS — R21 RASH: Primary | ICD-10-CM

## 2025-02-28 PROCEDURE — 2500000002 HC RX 250 W HCPCS SELF ADMINISTERED DRUGS (ALT 637 FOR MEDICARE OP, ALT 636 FOR OP/ED): Mod: SE

## 2025-02-28 PROCEDURE — 99284 EMERGENCY DEPT VISIT MOD MDM: CPT | Performed by: STUDENT IN AN ORGANIZED HEALTH CARE EDUCATION/TRAINING PROGRAM

## 2025-02-28 PROCEDURE — RXMED WILLOW AMBULATORY MEDICATION CHARGE

## 2025-02-28 PROCEDURE — 99283 EMERGENCY DEPT VISIT LOW MDM: CPT | Performed by: STUDENT IN AN ORGANIZED HEALTH CARE EDUCATION/TRAINING PROGRAM

## 2025-02-28 RX ORDER — VALACYCLOVIR HYDROCHLORIDE 1 G/1
1000 TABLET, FILM COATED ORAL 3 TIMES DAILY
Qty: 21 TABLET | Refills: 0 | Status: SHIPPED | OUTPATIENT
Start: 2025-02-28 | End: 2025-02-28

## 2025-02-28 RX ORDER — SULFAMETHOXAZOLE AND TRIMETHOPRIM 800; 160 MG/1; MG/1
1 TABLET ORAL 2 TIMES DAILY
Qty: 14 TABLET | Refills: 0 | Status: SHIPPED | OUTPATIENT
Start: 2025-02-28 | End: 2025-02-28

## 2025-02-28 RX ORDER — SULFAMETHOXAZOLE AND TRIMETHOPRIM 800; 160 MG/1; MG/1
1 TABLET ORAL 2 TIMES DAILY
Qty: 14 TABLET | Refills: 0 | Status: SHIPPED | OUTPATIENT
Start: 2025-02-28 | End: 2025-02-28 | Stop reason: WASHOUT

## 2025-02-28 RX ORDER — VALACYCLOVIR HYDROCHLORIDE 500 MG/1
1000 TABLET, FILM COATED ORAL ONCE
Status: COMPLETED | OUTPATIENT
Start: 2025-02-28 | End: 2025-02-28

## 2025-02-28 RX ORDER — SULFAMETHOXAZOLE AND TRIMETHOPRIM 800; 160 MG/1; MG/1
1 TABLET ORAL ONCE
Status: COMPLETED | OUTPATIENT
Start: 2025-02-28 | End: 2025-02-28

## 2025-02-28 RX ORDER — CLINDAMYCIN HYDROCHLORIDE 300 MG/1
300 CAPSULE ORAL 3 TIMES DAILY
Qty: 21 CAPSULE | Refills: 0 | Status: SHIPPED | OUTPATIENT
Start: 2025-02-28 | End: 2025-03-07

## 2025-02-28 RX ORDER — VALACYCLOVIR HYDROCHLORIDE 1 G/1
1000 TABLET, FILM COATED ORAL 3 TIMES DAILY
Qty: 21 TABLET | Refills: 0 | Status: SHIPPED | OUTPATIENT
Start: 2025-02-28 | End: 2025-03-07

## 2025-02-28 RX ADMIN — SULFAMETHOXAZOLE AND TRIMETHOPRIM 1 TABLET: 800; 160 TABLET ORAL at 14:08

## 2025-02-28 RX ADMIN — VALACYCLOVIR HYDROCHLORIDE 1000 MG: 500 TABLET, FILM COATED ORAL at 13:04

## 2025-02-28 ASSESSMENT — LIFESTYLE VARIABLES
TOTAL SCORE: 0
EVER HAD A DRINK FIRST THING IN THE MORNING TO STEADY YOUR NERVES TO GET RID OF A HANGOVER: NO
HAVE PEOPLE ANNOYED YOU BY CRITICIZING YOUR DRINKING: NO
EVER FELT BAD OR GUILTY ABOUT YOUR DRINKING: NO
HAVE YOU EVER FELT YOU SHOULD CUT DOWN ON YOUR DRINKING: NO

## 2025-02-28 ASSESSMENT — PAIN SCALES - GENERAL
PAINLEVEL_OUTOF10: 8
PAINLEVEL_OUTOF10: 0 - NO PAIN

## 2025-02-28 ASSESSMENT — PAIN DESCRIPTION - PAIN TYPE: TYPE: CHRONIC PAIN

## 2025-02-28 ASSESSMENT — PAIN - FUNCTIONAL ASSESSMENT: PAIN_FUNCTIONAL_ASSESSMENT: 0-10

## 2025-02-28 ASSESSMENT — PAIN DESCRIPTION - DESCRIPTORS: DESCRIPTORS: DISCOMFORT

## 2025-02-28 ASSESSMENT — PAIN DESCRIPTION - LOCATION: LOCATION: BUTTOCKS

## 2025-02-28 NOTE — PROGRESS NOTES
Link Macias is a 64 y.o. male on day 0 of admission     [facility] contacted by patient's request to be sent to a SNF as he was being discharged. [facility] introduced self and role in care to patient. Patient report that he had a facility in mind and the facility nurse(who was on the phone with the patient) said she could take him. Nurse from [facility] confirmed that they were willing to take patient. [facility] then contacted patient's doctor who was willing to reverse discharge order and put in PT/OT eval orders for the patient to be seen. PT/OT supervisors contacted to put patient on schedule to be seen. PT will be able to see patient tomorrow 2/28. Patient's nurse made aware of plan. [facility] will continue to follow patient for discharge planning.    Viv Colon, [facility]

## 2025-02-28 NOTE — ED PROVIDER NOTES
History of Present Illness     History provided by: Patient and EMS  Limitations to History: None  External Records Reviewed with Brief Summary:  ED note from 2/26/2025, presented for stage I pressure ulcer, started on barrier cream, discharge at that time    HPI:  Link Macias is a 64 y.o. male history of bilateral disarticulation of the hips due to PAD/recurrent osteomyelitis, HTN, HLD who presents today for L rib pain. Patient states that he's had this pain for the last day or two. He does endorse some itching, has previously had shingles but doesn't feel like this feels like shingles. Denies any     Physical Exam   Triage vitals:  T 36.5 °C (97.7 °F)  HR (!) 103  /68  RR 16  O2 95 %      Physical Exam  Vitals and nursing note reviewed.   Constitutional:       General: He is not in acute distress.     Appearance: Normal appearance. He is well-developed. He is obese. He is not ill-appearing or diaphoretic.   HENT:      Head: Normocephalic and atraumatic.   Eyes:      Conjunctiva/sclera: Conjunctivae normal.   Cardiovascular:      Rate and Rhythm: Normal rate and regular rhythm.      Heart sounds: No murmur heard.  Pulmonary:      Effort: Pulmonary effort is normal. No respiratory distress.      Breath sounds: Normal breath sounds. No wheezing or rhonchi.   Abdominal:      Palpations: Abdomen is soft.      Tenderness: There is no abdominal tenderness. There is no guarding or rebound.   Musculoskeletal:      Cervical back: Neck supple.      Comments: Bilateral lower extremities status post disarticulation at the hip, small healing ulcer on left pelvis   Skin:     General: Skin is warm and dry.      Capillary Refill: Capillary refill takes less than 2 seconds.      Comments: Hyperpigmented rash over left lateral rib with some erythema and edema, moderately tender to palpation, one 0.5 cm size blister with rupture, no other active vesicles, along roughly T4-T6 distribution, unilateral nature.    Neurological:      General: No focal deficit present.      Mental Status: He is alert and oriented to person, place, and time. Mental status is at baseline.      Cranial Nerves: No cranial nerve deficit.      Motor: No weakness.   Psychiatric:         Mood and Affect: Mood normal.          Medical Decision Making & ED Course   Medical Decision Makin y.o. male past medical history of hypertension hyperlipidemia PAD s/p disarticulation at the bilateral hips due to recurrent osteomyelitis who presents today for left-sided rib pain.  Patient has a new hyperpigmented rash with a questionable new circular lesion, unclear if this is a ruptured vesicle and this is very early onset presentation for zoster.  He does have some edema and erythema of the tissue as well, so we will cover him with antibiotics confirm he does not have a cellulitis.  Given fact the patient denied any trauma, do not believe patient requires any imaging at this time.  We will cover him with Valtrex, with his first dose here.  I will send a prescription for a 7-day course.  Will also give him a dose of Bactrim here, we will send him home with a 7-day course of this as well.  Plan discussed with patient, feels comfortable with this.  We did discuss getting labs, however, do not feel this is necessary at this time.  Patient was comfortable with this.  All questions were answered prior to discharge, turn precaution provided.  ----      Differential diagnoses considered include but are not limited to: Zoster rash, cellulitis chest wall, abrasion chest wall     Social Determinants of Health which Significantly Impact Care: None identified     EKG Independent Interpretation: EKG not obtained    Independent Result Review and Interpretation: Relevant laboratory and radiographic results were reviewed and independently interpreted by myself.  As necessary, they are commented on in the ED Course.    Chronic conditions affecting the patient's care: As  documented above in MDM    The patient was discussed with the following consultants/services: None    Care Considerations: As documented above in [facility]    ED Course:  Diagnoses as of 02/28/25 1306   Rash   Herpes zoster without complication   Cellulitis of chest wall     Disposition   As a result of the work-up, the patient was discharged home.  he was informed of his diagnosis and instructed to come back with any concerns or worsening of condition.  he and was agreeable to the plan as discussed above.  he was given the opportunity to ask questions.  All of the patient's questions were answered.    Procedures   Procedures    Patient seen and discussed with ED attending physician.    Ladarius Fuentes MD  Emergency Medicine       Ladarius Fuentes MD  Resident  02/28/25 1436

## 2025-03-01 ENCOUNTER — PHARMACY VISIT (OUTPATIENT)
Dept: PHARMACY | Facility: CLINIC | Age: 65
End: 2025-03-01
Payer: MEDICAID

## 2025-03-01 VITALS
BODY MASS INDEX: 29.45 KG/M2 | SYSTOLIC BLOOD PRESSURE: 146 MMHG | DIASTOLIC BLOOD PRESSURE: 54 MMHG | WEIGHT: 150 LBS | HEIGHT: 60 IN | RESPIRATION RATE: 18 BRPM | TEMPERATURE: 98.3 F | HEART RATE: 53 BPM | OXYGEN SATURATION: 100 %

## 2025-03-01 PROCEDURE — 97161 PT EVAL LOW COMPLEX 20 MIN: CPT | Mod: GP

## 2025-03-01 PROCEDURE — 2500000001 HC RX 250 WO HCPCS SELF ADMINISTERED DRUGS (ALT 637 FOR MEDICARE OP): Mod: SE

## 2025-03-01 PROCEDURE — RXMED WILLOW AMBULATORY MEDICATION CHARGE

## 2025-03-01 PROCEDURE — 97530 THERAPEUTIC ACTIVITIES: CPT | Mod: GP

## 2025-03-01 RX ORDER — ACETAMINOPHEN 325 MG/1
975 TABLET ORAL ONCE
Status: COMPLETED | OUTPATIENT
Start: 2025-03-01 | End: 2025-03-01

## 2025-03-01 RX ORDER — SULFAMETHOXAZOLE AND TRIMETHOPRIM 800; 160 MG/1; MG/1
1 TABLET ORAL 2 TIMES DAILY
Qty: 14 TABLET | Refills: 0 | Status: SHIPPED | OUTPATIENT
Start: 2025-03-01 | End: 2025-03-08

## 2025-03-01 RX ADMIN — ACETAMINOPHEN 975 MG: 325 TABLET ORAL at 17:36

## 2025-03-01 RX ADMIN — ACETAMINOPHEN 975 MG: 325 TABLET ORAL at 10:29

## 2025-03-01 ASSESSMENT — COGNITIVE AND FUNCTIONAL STATUS - GENERAL
CLIMB 3 TO 5 STEPS WITH RAILING: TOTAL
STANDING UP FROM CHAIR USING ARMS: TOTAL
MOBILITY SCORE: 10
TURNING FROM BACK TO SIDE WHILE IN FLAT BAD: A LITTLE
MOVING FROM LYING ON BACK TO SITTING ON SIDE OF FLAT BED WITH BEDRAILS: A LITTLE
WALKING IN HOSPITAL ROOM: TOTAL
MOVING TO AND FROM BED TO CHAIR: TOTAL

## 2025-03-01 ASSESSMENT — PAIN - FUNCTIONAL ASSESSMENT: PAIN_FUNCTIONAL_ASSESSMENT: 0-10

## 2025-03-01 ASSESSMENT — PAIN SCALES - GENERAL
PAINLEVEL_OUTOF10: 8
PAINLEVEL_OUTOF10: 7
PAINLEVEL_OUTOF10: 0 - NO PAIN
PAINLEVEL_OUTOF10: 9

## 2025-03-01 NOTE — PROGRESS NOTES
Physical Therapy    Physical Therapy Evaluation & Treatment    Patient Name: Link Macias  MRN: [id number]  [place] [facility] [place]  Room: CNTABGR51/GCHXTTH27  Today's Date: 3/1/2025   Time Calculation  Start Time: 0750  Stop Time: 0817  Time Calculation (min): 27 min    Assessment/Plan   PT Assessment  PT Assessment Results: Decreased strength, Decreased mobility, Pain  Rehab Prognosis: Good  Barriers to Discharge Home: Caregiver assistance, Physical needs  Caregiver Assistance: Patient lives alone and/or does not have reliable caregiver assistance  Physical Needs: 24hr mobility assistance needed, High falls risk due to function or environment  End of Session Communication: Bedside nurse  Assessment Comment: Pt currently demos below baseline for functional mobility, typically is fully (I) with scooting transfers now limited by pain and decreased strength.  Will benefit from skilled PT to return to baseline (I) functional mobility.  End of Session Patient Position:  (transport cart in [facility], no alarm present)   IP OR SWING BED PT PLAN  Inpatient or Swing Bed: Inpatient  PT Plan  Treatment/Interventions: Bed mobility, Transfer training, Balance training, Therapeutic exercise, Therapeutic activity  PT Plan: Ongoing PT  PT Frequency: 3 times per week  PT Discharge Recommendations: Moderate intensity level of continued care  PT Recommended Transfer Status: Assist x1  PT - OK to Discharge: Yes (POC/goals/discharge rec intensity created)      Subjective     General Visit Information:  General  Reason for Referral: (L) rib pain, sacral wounds,  difficulty with mobility  Past Medical History Relevant to Rehab: (B) AKA (no prosthetic use), chronic skin rash, anxiety, seizures  Prior to Session Communication: Bedside nurse  General Comment: Pt agreeable to participate though overall having high pain.  Pt does not have wheelchair present to attempt to transfer into however is able to demonstrate scooting/manuvering abilities on  transport cart.  Struggles currently with scooting tasks.  Will continue to follow.  Home Living:  Home Living  Type of Home: Apartment  Lives With: Alone  Home Adaptive Equipment: Wheelchair-manual, Wheelchair-power  Home Access: Elevator  Prior Level of Function:  Prior Function Per Pt/Caregiver Report  Receives Help From: Home health (5x week)  ADL Assistance:  (reports (I) with bathroom/showering though struggles recently.  Feels he needs help with wound care)  Homemaking Assistance:  (home health assists though he is able to complete some on his own)  Ambulatory Assistance:  (reports (I) scooting transfers to and from power chair/shower chair/bed, reports mostly able to clear hips though struggling recently due to pain.)  Precautions:  Precautions  Hearing/Visual Limitations: appears WFL, eyes appear bloodshot/red  Medical Precautions: Fall precautions         Objective   Pain:  Pain Assessment  Pain Assessment: 0-10  0-10 (Numeric) Pain Score: 9  Pain Location:  (flank)  Pain Orientation: Left  Pain Onset: Ongoing  Clinical Progression: Gradually worsening  Effect of Pain on Daily Activities: decreased mobility abilities  Pain Interventions: Repositioned, Distraction  Response to Interventions: No change in pain  Cognition:  Cognition  Arousal/Alertness: Appropriate responses to stimuli  Orientation Level: Oriented X4  Following Commands: Follows all commands and directions without difficulty  Cognition Comments: soft spoken, somewhat frustrated that he is still in ED    General Assessments:     Activity Tolerance  Endurance: Tolerates 10 - 20 min exercise with multiple rests    Sensation  Sensation Comment: denies N/T        Perception  Inattention/Neglect: Appears intact  Initiation: Appears intact  Motor Planning: Appears intact  Perseveration: Not present    Coordination  Movements are Fluid and Coordinated: Yes    Postural Control  Posture Comment: favors leaning to (R) at rest though is able to sit fully  upright on hips    Static Sitting Balance  Static Sitting-Balance Support: No upper extremity supported  Static Sitting-Level of Assistance: Close supervision  Dynamic Sitting Balance  Dynamic Sitting-Balance Support: No upper extremity supported  Dynamic Sitting-Level of Assistance: Contact guard  Dynamic Sitting-Comments: during dynamic UE movements     Functional Assessments:  Bed Mobility  Bed Mobility: Yes  Bed Mobility 1  Bed Mobility 1: Supine to sitting  Level of Assistance 1: Contact guard, Minimal verbal cues  Bed Mobility Comments 1: from (R) sidelying position to fully sitting upright pt demos heavy increased forward lean with head almost hitting bed rail, cues and assist to maintain safety, then able to push off bed rail with hands to bring trunk upright  Bed Mobility 2  Bed Mobility  2: Scooting (lateral)  Level of Assistance 2: Minimum assistance, Minimal verbal cues (x1)  Bed Mobility Comments 2: use of draw sheet to facilitate, unable to clear hips fully for 1 scoot  Bed Mobility 4  Bed Mobility 4: Sitting to supine  Level of Assistance 4: Close supervision, Minimal verbal cues  Bed Mobility Comments 4: heavy use of (B) UEs to control into sidelying position    Transfers  Transfer:  (pt does not have wheelchair present to attempt this date)  Extremity/Trunk Assessments:  RUE   RUE :  (generalized weakness)  LUE   LUE:  (generalized weakness)  Treatments:  Therapeutic Activity  Therapeutic Activity Performed: Yes  Therapeutic Activity 1: Completed upright sitting for ~10 min to increase upright activity while in ED though overall limited by pain with upright sitting. While sitting, able to complete (B) UE shoulder flexion x 10 reps each slowly and as tolerated with CGA at trunk for safety.  Assisted pt with repositioning of linens and procurement of pillow and OJ per request.    Bed Mobility  Bed Mobility: Yes  Bed Mobility 3  Bed Mobility 3: Scooting (posteriorly)  Level of Assistance 3: Contact  guard, Minimal verbal cues (x1)  Bed Mobility Comments 3: able to minimally clear hips for posterior scoots x 2    Outcome Measures:  [facility] Basic Mobility  Turning from your back to your side while in a flat bed without using bedrails: A little  Moving from lying on your back to sitting on the side of a flat bed without using bedrails: A little  Moving to and from bed to chair (including a wheelchair): Total  Standing up from a chair using your arms (e.g. wheelchair or bedside chair): Total  To walk in hospital room: Total  Climbing 3-5 steps with railing: Total  Basic Mobility - Total Score: 10    Encounter Problems       Encounter Problems (Active)       Balance       STG - Maintains dynamic sitting balance without upper extremity support with distant supervision.        Start:  03/01/25    Expected End:  03/15/25               PT Transfers       STG - Transfer from bed to chair with distant (S).        Start:  03/01/25    Expected End:  03/15/25            STG - Patient will perform bed mobility (I).        Start:  03/01/25    Expected End:  03/15/25                   Education Documentation  Mobility Training, taught by Rosa Smart PT at 3/1/2025  9:07 AM.  Learner: Patient  Readiness: Acceptance  Method: Explanation  Response: Verbalizes Understanding  Comment: PT POC, goals for mobility/activity    03/01/25 at 9:09 AM - Rosa Smart PT

## 2025-03-01 NOTE — PROGRESS NOTES
Pharmacy Medication History Review    Link Macias is a 64 y.o. male admitted for No Principal Problem: There is no principal problem currently on the Problem List. Please update the Problem List and refresh.. Pharmacy reviewed the patient's [id number] medications and allergies for accuracy.    Medications ADDED:  N/A  Medications CHANGED:  N/A  Medications REMOVED:   Nitrostat 0.4 mg SL tablet     The list below reflects the updated PTA list.   Prior to Admission Medications   Prescriptions Last Dose Informant   DULoxetine (Cymbalta) 60 mg DR capsule Past Week Self   Sig: Take 1 capsule (60 mg) by mouth once daily.   OXcarbazepine (Trileptal) 150 mg tablet Past Week Self   Sig: Take 1 tablet (150 mg) by mouth 2 times a day.   acetaminophen (Tylenol) 500 mg tablet Past Week Self   Sig: Take 2 tablets (1,000 mg) by mouth every 8 hours if needed for mild pain (1 - 3) for up to 10 days.   aspirin 81 mg EC tablet Past Week Self   Sig: Take 1 tablet (81 mg) by mouth once daily.   atorvastatin (Lipitor) 80 mg tablet Past Week Self   Sig: Take 1 tablet (80 mg) by mouth once daily at bedtime.   cholecalciferol (Vitamin D-3) 25 MCG (1000 UT) capsule Past Week Self   Sig: Take 1 capsule (25 mcg) by mouth once daily.   clobetasol (Temovate) 0.05 % cream Past Week Self   Sig: APPLY TOPICALLY TO CHEST AND BACK DAILY UNTIL YOU SEE MD BACK IN 6 WEEKS.   docusate sodium (Colace) 100 mg capsule 2/28/2025 Self   Sig: Take 1 capsule (100 mg) by mouth 2 times a day as needed for constipation.   emollient lotion Past Week Self   Sig: Apply topically if needed for dry skin.   empagliflozin (Jardiance) 25 mg Past Week Self   Sig: Take 1 tablet (25 mg) by mouth once daily with breakfast.   famotidine (Pepcid) 20 mg tablet Past Week Self   Sig: Take 1 tablet (20 mg) by mouth once daily.   gentamicin (Garamycin) 0.1 % ointment Past Week Self   Sig: Apply 1 Application topically once daily. apply ointment to affected arms and  shoulders area.   hydrOXYzine HCL (Atarax) 25 mg tablet Past Week Self   Sig: Take 1 tablet (25 mg) by mouth once daily as needed for itching or anxiety.   hydrocortisone 1 % cream Past Week Self   Sig: Apply 1 Application topically 2 times a day.   losartan (Cozaar) 100 mg tablet Past Week Self   Sig: Take 1 tablet (100 mg) by mouth once daily.   lubricating eye drops ophthalmic solution Past Week Self   Sig: Administer 1 drop into both eyes if needed for dry eyes.   melatonin 3 mg tablet Past Week Self   Sig: Take 1 tablet (3 mg) by mouth once daily at bedtime.   metFORMIN (Glucophage) 500 mg tablet Past Week Self   Sig: Take 1 tablet (500 mg) by mouth 2 times a day with meals.   metoprolol tartrate (Lopressor) 25 mg tablet Past Week Self   Sig: Take 1 tablet (25 mg) by mouth 2 times a day.   omega-3 acid ethyl esters (Lovaza) 1 gram capsule Past Week Self   Sig: Take 2 capsules (2 g) by mouth once daily.   pantoprazole (ProtoNix) 20 mg EC tablet Past Week Self   Sig: Take 1 tablet (20 mg) by mouth once daily in the morning. Take before meals. Do not crush, chew, or split. Patient takes as needed.   permethrin (Elimite) 5 % cream Past Week Self   Sig: APPLY TOPICALLY FROM NECK DOWN, INCLUDING PENIS, HANDS FINGERS, CHEST, BACK (EVERYWHERE) LET SIT OVERNIGHT, THEN WASH OFF AND REPEAT 1 WEEK LATER   polyethylene glycol (Miralax) 17 gram/dose powder Past Week Self   Sig: Mix 17 g of powder and drink 2 times a day.   pramoxine (Proctofoam) 1 % foam Past Week Self   Si aplication topically 2 to 3 times a day and after bowel movements   pregabalin (Lyrica) 75 mg capsule Past Week Self   Sig: Take 1 capsule (75 mg) by mouth 2 times a day.   zinc oxide 20 % ointment Past Week    Sig: Apply 1 Application topically if needed for irritation.      Facility-Administered Medications: None        The list below reflects the updated allergy list. Please review each documented allergy for additional clarification and  "justification.  Allergies  Reviewed by Abiodun Arango on 3/1/2025        Severity Reactions Comments    Penicillins Not Specified Unknown             Patient accepts M2B at discharge.     Sources:   [facility]  Pharmacy dispense history  Patient interview  Chart Review       - progress note 2/24/25 via Teodora Strong CPhT    Additional Comments:  Patient did not seem interested to interview with me about his medication and just reported the he had taken all of his medication within the past week.      ABIODUN ARANGO  Pharmacy Technician  03/01/25     Secure Chat preferred   If no response call [phone] or [employer] \"Med Rec\"   "

## 2025-03-01 NOTE — PROGRESS NOTES
Link Macias is a 64 y.o. male on day 0 of admission     TCC noted PT's recommendation of moderate intensity. [facility] confirmed that they would take patient. TCC made nurse and provider aware. PASRR completed in [facility], Facility to do LOC.  Transport scheduled in [facility] and confirmed with [facility] for 4:30pm. TCC will continue to follow patient for discharge planning.     Viv Colon

## 2025-03-01 NOTE — PROGRESS NOTES
Emergency Department Transition of Care Note       Signout   I received Link Macias in signout from Dr. Ingram.  Please see the ED Provider Note for all HPI, PE and MDM up to the time of signout at 0700.  This is in addition to the primary record.    In brief Link Macias is an 64 y.o. male presenting for who presented to the emergency department with debility, failed trial at home and does not feel like he can take care of himself.    At the time of signout we were awaiting:  PT/OT evaluation    ED Course & Medical Decision Making   Medical Decision Making:  Under my care, patient remained hemodynamically stable.  PT/OT evaluated patient, determined that he needed assistance with scooting.  Since this is an essential movement for him given that he has bilateral amputations, will plan for SNF placement and discharged to SNF.  Pending transportation.    ED Course:  Diagnoses as of 03/01/25 1523   Rash   Herpes zoster without complication   Cellulitis of chest wall       Disposition   As a result of the work-up, the patient was discharged to SNF.  he was informed of his diagnosis and instructed to come back with any concerns or worsening of condition.  he and was agreeable to the plan as discussed above.  he was given the opportunity to ask questions.  All of the patient's questions were answered.    Procedures   Procedures    Patient seen and discussed with attending physician.    Tangela Aguirre, DO  Emergency Medicine

## 2025-03-04 DIAGNOSIS — H01.00A BLEPHARITIS OF UPPER AND LOWER EYELIDS OF BOTH EYES, UNSPECIFIED TYPE: Primary | ICD-10-CM

## 2025-03-04 DIAGNOSIS — H01.00B BLEPHARITIS OF UPPER AND LOWER EYELIDS OF BOTH EYES, UNSPECIFIED TYPE: Primary | ICD-10-CM

## 2025-03-05 ENCOUNTER — APPOINTMENT (OUTPATIENT)
Dept: DERMATOLOGY | Facility: CLINIC | Age: 65
End: 2025-03-05
Payer: MEDICAID

## 2025-03-07 ENCOUNTER — APPOINTMENT (OUTPATIENT)
Dept: WOUND CARE | Facility: HOSPITAL | Age: 65
End: 2025-03-07
Payer: MEDICAID

## 2025-03-11 ENCOUNTER — APPOINTMENT (OUTPATIENT)
Dept: WOUND CARE | Facility: HOSPITAL | Age: 65
End: 2025-03-11
Payer: MEDICAID

## 2025-03-18 ENCOUNTER — OFFICE VISIT (OUTPATIENT)
Dept: WOUND CARE | Facility: HOSPITAL | Age: 65
End: 2025-03-18
Payer: MEDICAID

## 2025-03-18 DIAGNOSIS — L89.151 PRESSURE INJURY OF SACRAL REGION, STAGE 1: ICD-10-CM

## 2025-03-18 PROCEDURE — 99214 OFFICE O/P EST MOD 30 MIN: CPT

## 2025-03-25 ENCOUNTER — APPOINTMENT (OUTPATIENT)
Dept: WOUND CARE | Facility: HOSPITAL | Age: 65
End: 2025-03-25
Payer: MEDICAID

## 2025-03-28 ENCOUNTER — OFFICE VISIT (OUTPATIENT)
Dept: WOUND CARE | Facility: HOSPITAL | Age: 65
End: 2025-03-28
Payer: MEDICAID

## 2025-03-28 PROCEDURE — 99213 OFFICE O/P EST LOW 20 MIN: CPT
